# Patient Record
Sex: MALE | Race: WHITE | Employment: OTHER | URBAN - METROPOLITAN AREA
[De-identification: names, ages, dates, MRNs, and addresses within clinical notes are randomized per-mention and may not be internally consistent; named-entity substitution may affect disease eponyms.]

---

## 2017-05-19 ENCOUNTER — OFFICE VISIT (OUTPATIENT)
Dept: CARDIOLOGY CLINIC | Age: 77
End: 2017-05-19

## 2017-05-19 ENCOUNTER — CLINICAL SUPPORT (OUTPATIENT)
Dept: CARDIOLOGY CLINIC | Age: 77
End: 2017-05-19

## 2017-05-19 VITALS
WEIGHT: 197.25 LBS | DIASTOLIC BLOOD PRESSURE: 60 MMHG | BODY MASS INDEX: 29.21 KG/M2 | SYSTOLIC BLOOD PRESSURE: 118 MMHG | RESPIRATION RATE: 16 BRPM | OXYGEN SATURATION: 97 % | HEIGHT: 69 IN | HEART RATE: 61 BPM

## 2017-05-19 DIAGNOSIS — R55 NEAR SYNCOPE: ICD-10-CM

## 2017-05-19 DIAGNOSIS — R00.1 BRADYCARDIA: ICD-10-CM

## 2017-05-19 DIAGNOSIS — I25.10 CORONARY ARTERY DISEASE INVOLVING NATIVE HEART WITHOUT ANGINA PECTORIS, UNSPECIFIED VESSEL OR LESION TYPE: ICD-10-CM

## 2017-05-19 DIAGNOSIS — I10 ESSENTIAL HYPERTENSION: ICD-10-CM

## 2017-05-19 DIAGNOSIS — I25.10 CORONARY ARTERY DISEASE INVOLVING NATIVE HEART WITHOUT ANGINA PECTORIS, UNSPECIFIED VESSEL OR LESION TYPE: Primary | ICD-10-CM

## 2017-05-19 DIAGNOSIS — E78.00 HYPERCHOLESTEROLEMIA: ICD-10-CM

## 2017-05-19 RX ORDER — CEPHALEXIN 500 MG/1
500 CAPSULE ORAL 4 TIMES DAILY
COMMUNITY
Start: 2017-05-10 | End: 2017-05-25

## 2017-05-19 RX ORDER — NITROGLYCERIN 400 UG/1
1 SPRAY ORAL
Qty: 1 BOTTLE | Refills: 1 | Status: SHIPPED | OUTPATIENT
Start: 2017-05-19 | End: 2017-05-25

## 2017-05-19 RX ORDER — NITROGLYCERIN 400 UG/1
1 SPRAY ORAL
Qty: 1 BOTTLE | Refills: 1 | Status: SHIPPED | OUTPATIENT
Start: 2017-05-19 | End: 2017-05-19 | Stop reason: SDUPTHER

## 2017-05-19 NOTE — PROGRESS NOTES
NAME:  Nazario Stover. :   1940   MRN:   [id number]   PCP:  Jose Garcia MD           Subjective: The patient is a 68y.o. year old male  who returns for a annual follow-up. Since the last visit, patient reports no change in exercise tolerance, chest pain, edema, medication intolerance, palpitations, shortness of breath, PND/orthopnea wheezing, sputum. Pt reports one episode of near syncope while standing in his kitchen.  Reports having similar episode in the past. Recently had basal cell carcinoma removed on his right ear, skin graft from right chest.     Past Medical History:   Diagnosis Date    Arthritis     parris knee    Asthma     dr Saranya Sen 1/15/15 note rec'd 16 pul fibrosis    Basal cell carcinoma of skin 05/10/2017    right helix Jose David Baez's note rec'd    CAD (coronary artery disease)     dr Chary Solano     CKD (chronic kidney disease), stage III 2014    Abou Assi notes 6/2/15    Colon polyps 13    also dad with colon cancer    Contact dermatitis and other eczema, due to unspecified cause     eczema dr Angela Mar    COPD     dr Melina Nation chronic bronchitis     16    Elevated serum creatinine     Fall  & 2012    Right knee gave away and tripped in his house    GERD (gastroesophageal reflux disease)     dr Sarina Mccain (hard of hearing)     Hypercholesterolemia     [facility]stitial [facility] 7/15/14    Dr Júnior Manley's   pulmonary fibrosis 16 f/u     Proteinuria 10/2012    abou assi     16    RLS (restless legs syndrome)     Schatzki's ring     dilation dr Parul Trinidad for glaucoma 14    Aryan Barefoot note rec'd (annual eye exam)    Shingles 2012    Sleep apnea     uses CPAP- no longer uses       Social History   Substance Use Topics    Smoking status: Former Smoker    Smokeless tobacco: Former User     Quit date: 1985    Alcohol use No      Family History   Problem Relation Age of Onset    Cancer Father       from colon cancer        Review of Systems  Constitutional: Negative for fever, chills, and diaphoresis. Respiratory: Negative for cough, hemoptysis, sputum production, shortness of breath and wheezing. Cardiovascular: Negative for chest pain, palpitations, orthopnea, claudication, leg swelling and PND. Gastrointestinal: Negative for heartburn, nausea, vomiting, blood in stool and melena. Genitourinary: Negative for dysuria and flank pain. Musculoskeletal: Negative for joint pain and back pain. Skin: Negative for rash. Neurological: Negative for focal weakness, seizures, loss of consciousness, weakness and headaches. Endo/Heme/Allergies: Does not bruise/bleed easily. Psychiatric/Behavioral: Negative for memory loss. The patient does not have insomnia. Objective:       Vitals:    17 1422 17 1436 17 1437   BP: 136/80 130/80 118/60   Pulse: (!) 53 63 61   Resp: 16     SpO2: 97%     Weight: 197 lb 4 oz (89.5 kg)     Height: 5' 9\" (1.753 m)      Body mass index is 29.13 kg/(m^2). General PE    Gen: NAD     Mental Status - Alert. General Appearance - Not in acute distress. Neck - no JVD     Chest and Lung Exam     Inspection: Accessory muscles - No use of accessory muscles in breathing. Auscultation:   Breath sounds: - Normal.     Cardiovascular   Inspection: Jugular vein - Bilateral - Inspection Normal.   Palpation/Percussion:   Apical Impulse: - Normal.   Auscultation: Rhythm - Regular, bradycardia. Heart Sounds - S1 WNL and S2 WNL. No S3 or S4. Murmurs & Other Heart Sounds: Auscultation of the heart reveals - No Murmurs. Peripheral Vascular   Upper Extremity: Inspection - Bilateral - No Cyanotic nailbeds or Digital clubbing. Lower Extremity:   Palpation: Edema - Bilateral - No edema. Abdomen: Soft, non-tender, bowel sounds are active.      Neuro: A&O times 3, CN and motor grossly WNL      Data Review:     EKG -  Sinus  Bradycardia -With rate variation   cv = 11.  -Old anterior infarct. Allergies reviewed  No Known Allergies    Medications reviewed  Current Outpatient Prescriptions   Medication Sig    cephALEXin (KEFLEX) 500 mg capsule Take 500 mg by mouth four (4) times daily.  nitroglycerin (NITROLINGUAL) 400 mcg/spray spray 1 Spray by SubLINGual route every five (5) minutes as needed for Chest Pain.  omeprazole (PRILOSEC) 20 mg capsule TAKE 1 CAPSULES BY MOUTH DAILY. TO REDUCE STOMACH ACID    aspirin delayed-release 81 mg tablet Take  by mouth daily.  pravastatin (PRAVACHOL) 40 mg tablet TAKE 1 TABLET EVERY DAY IN THE NIGHT TO HELP LOWER CHOLESTEROL    PROAIR HFA 90 mcg/actuation inhaler     ketoconazole (NIZORAL) 2 % topical cream     albuterol (PROVENTIL VENTOLIN) 2.5 mg /3 mL (0.083 %) nebulizer solution 3 mL by Nebulization route every four (4) hours as needed for Wheezing or Shortness of Breath.  ALBUTEROL SULFATE (PROAIR HFA IN) Take 2 puffs by inhalation four (4) times daily as needed.  ADVAIR DISKUS 100-50 mcg/Dose diskus inhaler Take 1 puff by inhalation two (2) times a day.  hydrocortisone (WESTCORT) 0.2 % topical cream     loratadine (CLARITIN) 10 mg tablet Take 10 mg by mouth daily.  coenzyme q10 30 mg capsule Take 30 mg by mouth three (3) times daily.  MULTIVITAMINS (MULTIPLE VITAMIN PO) Take  by mouth.  azelastine (ASTELIN) 137 mcg nasal spray 1 Spray by Nasal route two (2) times a day. Use in each nostril as directed     GLUC HCL/CSANA/GLY-AM-GLY,MX/C (COSAMIN DS PO) Take  by mouth.  cholecalciferol, vitamin d3, (VITAMIN D) 1,000 unit tablet Take  by mouth daily. No current facility-administered medications for this visit. Assessment:       ICD-10-CM ICD-9-CM    1. Coronary artery disease involving native heart without angina pectoris, unspecified vessel or lesion type I25.10 414.01 CARDIAC HOLTER MONITOR, 24 HOURS   2.  Essential hypertension I10 401.9 AMB POC EKG ROUTINE W/ 12 LEADS, INTER & REP      CARDIAC HOLTER MONITOR, 24 HOURS   3. Hypercholesterolemia E78.00 272.0 CARDIAC HOLTER MONITOR, 24 HOURS   4. Bradycardia R00.1 427.89 CARDIAC HOLTER MONITOR, 24 HOURS   5. Near syncope R55 780.2 CARDIAC HOLTER MONITOR, 24 HOURS        Orders Placed This Encounter    AMB POC EKG ROUTINE W/ 12 LEADS, INTER & REP     Order Specific Question:   Reason for Exam:     Answer:   routine    HOLTER MONITOR, 24 HOURS, Clinic Performed     Standing Status:   Future     Standing Expiration Date:   2017     Order Specific Question:   Reason for Exam:     Answer:   marked bradycardia, near syncope.  cephALEXin (KEFLEX) 500 mg capsule     Sig: Take 500 mg by mouth four (4) times daily.  nitroglycerin (NITROLINGUAL) 400 mcg/spray spray     Si Spray by SubLINGual route every five (5) minutes as needed for Chest Pain. Dispense:  1 Bottle     Refill:  1       Patient Active Problem List   Diagnosis Code    COPD [facility]chronic obstructive pulmonary disease) ([facility]) J44.9    GERD (gastroesophageal reflux disease) K21.9    Allergic rhinitis J30.9    DJD (degenerative joint disease) of knee M17.10    Vitamin D deficiency E55.9    BPH (benign prostatic hypertrophy) with urinary obstruction N40.1, N13.8    Acne rosacea L71.9    CKD (chronic kidney disease) N18.9    Family history of colon cancer Z80.0    Colon polyp K63.5    Diverticulosis K57.90    ED (erectile dysfunction) N52.9    CAD (coronary artery disease) I25.10    Hypercholesterolemia E78.00    Idiopathic pulmonary fibrosis ([facility] J84.112    Hypertension I10    Left inguinal hernia K40.90    Hematuria R31.9       Plan:     Patient presents for follow up, with recent near syncope. EKG sinus atilio with prolong QT not on typical medications to prolong. Not orthostatic. Will hold prilosec with potential QT prolongation. Holter today and have him see EP. Plan discussed and reviewed  with Dr Suzan Quigley.      Fara Orozco, ANP

## 2017-05-19 NOTE — PROGRESS NOTES
Annual check up. Patient states felt like he was going to pass out few weeks ago. Denies any other symptoms.

## 2017-05-23 ENCOUNTER — TELEPHONE (OUTPATIENT)
Dept: CARDIOLOGY CLINIC | Age: 77
End: 2017-05-23

## 2017-05-23 NOTE — PROGRESS NOTES
SEVERE bradycardia which may have lead to his episode of almost passing out. I discussed with HG. Please have him put in the schedule to see EP this Thursday.

## 2017-05-23 NOTE — TELEPHONE ENCOUNTER
Notes Recorded by Dakota Hoffman LPN on 6/03/3303 at 4:08 PM  Verified patient with two identifiers.  Spoke with patient informed him per Fara CASTILLO SEVERE bradycardia which may have lead to his episode of almost passing out. I discussed with Shannon Anaya have him put in the schedule to see EP this Thursday.  Appt was made for 05/25/2017 at . Rodney Lobo verbalized understanding.

## 2017-05-23 NOTE — PROGRESS NOTES
Verified patient with two identifiers. Spoke with patient informed him per Fara CASTILLO SEVERE bradycardia which may have lead to his episode of almost passing out. I discussed with Jac William have him put in the schedule to see EP this Thursday. Appt was made for 05/25/2017 at 10am.  Patient verbalized understanding.

## 2017-05-23 NOTE — TELEPHONE ENCOUNTER
----- Message from Karson Haque, ANP sent at 5/23/2017  2:18 PM EDT -----  SEVERE bradycardia which may have lead to his episode of almost passing out. I discussed with HG. Please have him put in the schedule to see EP this Thursday.

## 2017-05-25 ENCOUNTER — OFFICE VISIT (OUTPATIENT)
Dept: CARDIOLOGY CLINIC | Age: 77
End: 2017-05-25

## 2017-05-25 ENCOUNTER — HOSPITAL ENCOUNTER (OUTPATIENT)
Dept: LAB | Age: 77
Discharge: HOME | End: 2017-05-25
Payer: MEDICARE

## 2017-05-25 ENCOUNTER — HOSPITAL ENCOUNTER (OUTPATIENT)
Dept: GENERAL RADIOLOGY | Age: 77
Discharge: HOME | End: 2017-05-25
Payer: MEDICARE

## 2017-05-25 VITALS
DIASTOLIC BLOOD PRESSURE: 60 MMHG | SYSTOLIC BLOOD PRESSURE: 122 MMHG | BODY MASS INDEX: 29.33 KG/M2 | HEART RATE: 60 BPM | RESPIRATION RATE: 16 BRPM | HEIGHT: 69 IN | OXYGEN SATURATION: 98 % | WEIGHT: 198 LBS

## 2017-05-25 DIAGNOSIS — I10 ESSENTIAL HYPERTENSION: ICD-10-CM

## 2017-05-25 DIAGNOSIS — R00.1 BRADYCARDIA: Primary | ICD-10-CM

## 2017-05-25 DIAGNOSIS — I49.5 SSS (SICK SINUS SYNDROME) (HCC): ICD-10-CM

## 2017-05-25 DIAGNOSIS — R00.1 BRADYCARDIA: ICD-10-CM

## 2017-05-25 PROCEDURE — 83735 ASSAY OF MAGNESIUM: CPT

## 2017-05-25 PROCEDURE — 71020 XR CHEST PA LAT: CPT

## 2017-05-25 PROCEDURE — 36415 COLL VENOUS BLD VENIPUNCTURE: CPT

## 2017-05-25 PROCEDURE — 85610 PROTHROMBIN TIME: CPT

## 2017-05-25 PROCEDURE — 85027 COMPLETE CBC AUTOMATED: CPT

## 2017-05-25 PROCEDURE — 80053 COMPREHEN METABOLIC PANEL: CPT

## 2017-05-25 NOTE — MR AVS SNAPSHOT
Visit Information Date & Time Provider Department Dept. Phone Encounter #  
 5/25/2017 10:00 AM Uche Perdue Postin, [address] [facility] [phone] [id number] Follow-up Instructions Return in about 4 weeks (around 6/22/2017). Routing History Follow-up and Disposition History Your Appointments 6/2/2017  2:00 PM  
RESULTS/REVIEW with Lisa Douglas MD  
[facility] [address] [place] [address]) Appt Note: holter results, ekr [address] [place] [facility]  
[phone] [address] [place]  
  
    
 6/13/2017  3:30 PM  
EP STUDY with Preston Navarro MD  
[facility] [address]) Appt Note: per Fara, passing out spells [address] [place] [facility]  
[phone] [address] [place] [address] [address]  
  
    
 8/30/2017  2:00 PM  
COMPLETE PHYSICAL with MD Danna Alexander[address]) Appt Note: cpe $0 05/19/2017nb [address] [address]  
[phone]  
  
   
 [address] Upcoming Health Maintenance Date Due DTaP/Tdap/Td series (1 - Tdap) 1/31/2006 GLAUCOMA SCREENING Q2Y 6/18/2016 INFLUENZA AGE 9 TO ADULT 8/1/2017 MEDICARE YEARLY EXAM 8/27/2017 COLONOSCOPY 9/25/2018 Allergies as of 5/25/2017  Review Complete On: 5/25/2017 By: Preston Navarro MD  
 No Known Allergies Current Immunizations  Reviewed on 8/26/2016 Name Date Influenza High Dose Vaccine PF 10/8/2015 Influenza Vaccine 11/15/2014 Pneumococcal Conjugate (PCV-13) 1/28/2015 Pneumococcal Vaccine (Unspecified Type) 1/30/2006 TD Vaccine 1/30/2006 Zoster 9/13/2010 Not reviewed this visit You Were Diagnosed With   
  
 Codes Comments Bradycardia    -  Primary ICD-10-CM: R00.1 ICD-9-CM: 427.89   
 Essential hypertension     ICD-10-CM: I10 
ICD-9-CM: 401.9 SSS (sick sinus syndrome) ([facility])     ICD-10-CM: I49.5 ICD-9-CM: 427.81 Vitals BP Pulse Resp Height(growth percentile) Weight(growth percentile) SpO2  
 122/60 (BP 1 Location: Right arm, BP Patient Position: Sitting) 60 16 5' 9\" (1.753 m) 198 lb (89.8 kg) 98% BMI Smoking Status 29.24 kg/m2 Former Smoker Vitals History BMI and BSA Data Body Mass Index Body Surface Area  
 29.24 kg/m 2 2.09 m 2 Preferred Pharmacy Pharmacy Name Phone [facility] [phone] Your Updated Medication List  
  
   
This list is accurate as of: 5/25/17 10:49 AM.  Always use your most recent med list.  
  
  
  
  
 ADVAIR DISKUS 100-50 mcg/dose diskus inhaler Generic drug:  fluticasone-salmeterol Take 1 puff by inhalation two (2) times a day. aspirin delayed-release 81 mg tablet Take  by mouth daily. ASTELIN 137 mcg (0.1 %) nasal spray Generic drug:  azelastine 1 Spray by Nasal route two (2) times a day. Use in each nostril as directed CLARITIN 10 mg tablet Generic drug:  loratadine Take 10 mg by mouth daily. coenzyme Q-10 30 mg capsule Commonly known as:  CO Q-10 Take 30 mg by mouth three (3) times daily. COSAMIN DS PO Take  by mouth. hydrocortisone valerate 0.2 % topical cream  
Commonly known as:  WESTCORT  
  
 ketoconazole 2 % topical cream  
Commonly known as:  NIZORAL MULTIPLE VITAMIN PO Take  by mouth. omeprazole 20 mg capsule Commonly known as:  PRILOSEC  
TAKE 1 CAPSULES BY MOUTH DAILY. TO REDUCE STOMACH ACID  
  
 pravastatin 40 mg tablet Commonly known as:  PRAVACHOL  
TAKE 1 TABLET EVERY DAY IN THE NIGHT TO HELP LOWER CHOLESTEROL  
  
 * PROAIR HFA IN Take 2 puffs by inhalation four (4) times daily as needed. * albuterol 2.5 mg /3 mL (0.083 %) nebulizer solution Commonly known as:  PROVENTIL VENTOLIN  
3 mL by Nebulization route every four (4) hours as needed for Wheezing or Shortness of Breath. * PROAIR HFA 90 mcg/actuation inhaler Generic drug:  albuterol VITAMIN D3 1,000 unit tablet Generic drug:  cholecalciferol Take  by mouth daily. * Notice: This list has 3 medication(s) that are the same as other medications prescribed for you. Read the directions carefully, and ask your doctor or other care provider to review them with you. We Performed the Following AMB POC EKG ROUTINE W/ 12 LEADS, INTER & REP [23314 CPT(R)] CBC W/O DIFF [19970 CPT(R)] MAGNESIUM [id number] CPT(R)] METABOLIC PANEL, COMPREHENSIVE [13697 CPT(R)] PROTHROMBIN TIME + INR [85374 CPT(R)] Follow-up Instructions Return in about 4 weeks (around 6/22/2017). To-Do List   
 Around 05/25/2017 Imaging:  XR CHEST PA LAT   
  
 05/30/2017 8:00 AM  
  Appointment with [address] [place] [address] at [address] [place] [address] ([phone]) NPO AFTER MIDNIGHT! ROUTINE CASES:  Please arrive 2 hour prior to your scheduled appointment time. If your scheduled appointment is for 0730, 0800, 0815, please arrive by 0645. NON ROUTINE CASES:  PATIENTS WHO REQUIRE LABS, X-RAY, EKG, or MEDS:  PLEASE ARRIVE 3 HOURS PRIOR TO YOUR SCHEDULED APPOINTMENT. If you require hydration prior to your procedure, PLEASE ARRIVE 4 HOURS PRIOR TO YOUR APPOINTMENT  **** IT IS THE OFFICE SCHEDULARS RESPONSBILITY TO NOTIFY THE CATH LAB SCHEDULAR IF THE PATIENT WILL REQUIRE ANY ADDITIONAL TIME FOR PREP FROM ROUTINE CASE ***** Introducing [facility]! Amanda Chaudhary introduces [employer] patient portal. Now you can access parts of your medical record, email your doctor's office, and request medication refills online. 1. In your internet browser, go to [email or web address] 2. Click on the First Time User? Click Here link in the Sign In box. You will see the New Member Sign Up page. 3. Enter your [employer] Access Code exactly as it appears below. You will not need to use this code after youve completed the sign-up process. If you do not sign up before the expiration date, you must request a new code. · [employer] Access Code: [email or web address] Expires: 8/17/2017  3:34 PM 
 
4. Enter the last four digits of your Social Security Number (xxxx) and Date of Birth (mm/dd/yyyy) as indicated and click Submit. You will be taken to the next sign-up page. 5. Create a [employer] ID. This will be your [employer] login ID and cannot be changed, so think of one that is secure and easy to remember. 6. Create a [employer] password. You can change your password at any time. 7. Enter your Password Reset Question and Answer. This can be used at a later time if you forget your password. 8. Enter your e-mail address. You will receive e-mail notification when new information is available in [address] 9. Click Sign Up. You can now view and download portions of your medical record. 10. Click the Download Summary menu link to download a portable copy of your medical information. If you have questions, please visit the Frequently Asked Questions section of the [employer] website. Remember, [employer] is NOT to be used for urgent needs. For medical emergencies, dial 911. Now available from your iPhone and Android! Please provide this summary of care documentation to your next provider. Your primary care clinician is listed as [id number] If you have any questions after today's visit, please call [phone].

## 2017-05-26 LAB
ALBUMIN SERPL-MCNC: 4 G/DL (ref 3.5–4.8)
ALBUMIN/GLOB SERPL: 1.7 {RATIO} (ref 1.2–2.2)
ALP SERPL-CCNC: 90 IU/L (ref 39–117)
ALT SERPL-CCNC: 19 IU/L (ref 0–44)
AST SERPL-CCNC: 26 IU/L (ref 0–40)
BILIRUB SERPL-MCNC: 0.8 MG/DL (ref 0–1.2)
BUN SERPL-MCNC: 23 MG/DL (ref 8–27)
BUN/CREAT SERPL: 18 (ref 10–24)
CALCIUM SERPL-MCNC: 8.8 MG/DL (ref 8.6–10.2)
CHLORIDE SERPL-SCNC: 102 MMOL/L (ref 96–106)
CO2 SERPL-SCNC: 26 MMOL/L (ref 18–29)
CREAT SERPL-MCNC: 1.28 MG/DL (ref 0.76–1.27)
ERYTHROCYTE [DISTWIDTH] IN BLOOD BY AUTOMATED COUNT: 14.5 % (ref 12.3–15.4)
GLOBULIN SER CALC-MCNC: 2.4 G/DL (ref 1.5–4.5)
GLUCOSE SERPL-MCNC: 81 MG/DL (ref 65–99)
HCT VFR BLD AUTO: 46.3 % (ref 37.5–51)
HGB BLD-MCNC: 15.7 G/DL (ref 12.6–17.7)
INR PPP: 1 (ref 0.8–1.2)
INTERPRETATION: NORMAL
MAGNESIUM SERPL-MCNC: 2 MG/DL (ref 1.6–2.3)
MCH RBC QN AUTO: 33.8 PG (ref 26.6–33)
MCHC RBC AUTO-ENTMCNC: 33.9 G/DL (ref 31.5–35.7)
MCV RBC AUTO: 100 FL (ref 79–97)
PLATELET # BLD AUTO: 214 X10E3/UL (ref 150–379)
POTASSIUM SERPL-SCNC: 4.9 MMOL/L (ref 3.5–5.2)
PROT SERPL-MCNC: 6.4 G/DL (ref 6–8.5)
PROTHROMBIN TIME: 10.7 SEC (ref 9.1–12)
RBC # BLD AUTO: 4.65 X10E6/UL (ref 4.14–5.8)
SODIUM SERPL-SCNC: 142 MMOL/L (ref 134–144)
WBC # BLD AUTO: 8.4 X10E3/UL (ref 3.4–10.8)

## 2017-05-30 ENCOUNTER — HOSPITAL ENCOUNTER (OUTPATIENT)
Dept: NON INVASIVE DIAGNOSTICS | Age: 77
Discharge: HOME | End: 2017-05-30
Attending: INTERNAL MEDICINE | Admitting: INTERNAL MEDICINE
Payer: MEDICARE

## 2017-05-30 ENCOUNTER — APPOINTMENT (OUTPATIENT)
Dept: GENERAL RADIOLOGY | Age: 77
End: 2017-05-30
Attending: INTERNAL MEDICINE
Payer: MEDICARE

## 2017-05-30 VITALS
RESPIRATION RATE: 23 BRPM | SYSTOLIC BLOOD PRESSURE: 113 MMHG | TEMPERATURE: 97.8 F | BODY MASS INDEX: 29.53 KG/M2 | HEART RATE: 66 BPM | OXYGEN SATURATION: 95 % | WEIGHT: 200 LBS | DIASTOLIC BLOOD PRESSURE: 65 MMHG

## 2017-05-30 PROCEDURE — 71010 XR CHEST PORT: CPT

## 2017-05-30 PROCEDURE — 77030018729 HC ELECTRD DEFIB PAD CARD -B

## 2017-05-30 PROCEDURE — C1898 LEAD, PMKR, OTHER THAN TRANS: HCPCS

## 2017-05-30 PROCEDURE — 77030018836 HC SOL IRR NACL ICUM -A

## 2017-05-30 PROCEDURE — 74011250636 HC RX REV CODE- 250/636

## 2017-05-30 PROCEDURE — 77030011640 HC PAD GRND REM COVD -A

## 2017-05-30 PROCEDURE — L3670 SO ACRO/CLAV CAN WEB PRE OTS: HCPCS

## 2017-05-30 PROCEDURE — 77030031139 HC SUT VCRL2 J&J -A

## 2017-05-30 PROCEDURE — 33208 INSRT HEART PM ATRIAL & VENT: CPT

## 2017-05-30 PROCEDURE — 77030002996 HC SUT SLK J&J -A

## 2017-05-30 PROCEDURE — C1785 PMKR, DUAL, RATE-RESP: HCPCS

## 2017-05-30 PROCEDURE — 74011000250 HC RX REV CODE- 250

## 2017-05-30 PROCEDURE — C1894 INTRO/SHEATH, NON-LASER: HCPCS

## 2017-05-30 PROCEDURE — C1892 INTRO/SHEATH,FIXED,PEEL-AWAY: HCPCS

## 2017-05-30 RX ORDER — SODIUM CHLORIDE 0.9 % (FLUSH) 0.9 %
5-10 SYRINGE (ML) INJECTION AS NEEDED
Status: DISCONTINUED | OUTPATIENT
Start: 2017-05-30 | End: 2017-05-30 | Stop reason: HOSPADM

## 2017-05-30 RX ORDER — SODIUM CHLORIDE 0.9 % (FLUSH) 0.9 %
5-10 SYRINGE (ML) INJECTION EVERY 8 HOURS
Status: DISCONTINUED | OUTPATIENT
Start: 2017-05-30 | End: 2017-05-30 | Stop reason: HOSPADM

## 2017-05-30 RX ORDER — BACITRACIN 50000 [IU]/1
INJECTION, POWDER, FOR SOLUTION INTRAMUSCULAR
Status: COMPLETED
Start: 2017-05-30 | End: 2017-05-30

## 2017-05-30 RX ORDER — NALOXONE HYDROCHLORIDE 0.4 MG/ML
0.4 INJECTION, SOLUTION INTRAMUSCULAR; INTRAVENOUS; SUBCUTANEOUS AS NEEDED
Status: DISCONTINUED | OUTPATIENT
Start: 2017-05-30 | End: 2017-05-30 | Stop reason: HOSPADM

## 2017-05-30 RX ORDER — FENTANYL CITRATE 50 UG/ML
INJECTION, SOLUTION INTRAMUSCULAR; INTRAVENOUS
Status: COMPLETED
Start: 2017-05-30 | End: 2017-05-30

## 2017-05-30 RX ORDER — LIDOCAINE HYDROCHLORIDE 10 MG/ML
INJECTION INFILTRATION; PERINEURAL
Status: COMPLETED
Start: 2017-05-30 | End: 2017-05-30

## 2017-05-30 RX ORDER — MIDAZOLAM HYDROCHLORIDE 1 MG/ML
INJECTION, SOLUTION INTRAMUSCULAR; INTRAVENOUS
Status: COMPLETED
Start: 2017-05-30 | End: 2017-05-30

## 2017-05-30 RX ORDER — CEFAZOLIN SODIUM IN 0.9 % NACL 2 G/100 ML
2 PLASTIC BAG, INJECTION (ML) INTRAVENOUS ONCE
Status: DISCONTINUED | OUTPATIENT
Start: 2017-05-30 | End: 2017-05-30 | Stop reason: HOSPADM

## 2017-05-30 RX ORDER — HEPARIN SODIUM 200 [USP'U]/100ML
500 INJECTION, SOLUTION INTRAVENOUS ONCE
Status: COMPLETED | OUTPATIENT
Start: 2017-05-30 | End: 2017-05-30

## 2017-05-30 RX ORDER — FENTANYL CITRATE 50 UG/ML
25-50 INJECTION, SOLUTION INTRAMUSCULAR; INTRAVENOUS
Status: DISCONTINUED | OUTPATIENT
Start: 2017-05-30 | End: 2017-05-30

## 2017-05-30 RX ORDER — CEFAZOLIN SODIUM IN 0.9 % NACL 2 G/100 ML
PLASTIC BAG, INJECTION (ML) INTRAVENOUS
Status: COMPLETED
Start: 2017-05-30 | End: 2017-05-30

## 2017-05-30 RX ORDER — LIDOCAINE HYDROCHLORIDE 10 MG/ML
0-30 INJECTION INFILTRATION; PERINEURAL ONCE
Status: COMPLETED | OUTPATIENT
Start: 2017-05-30 | End: 2017-05-30

## 2017-05-30 RX ORDER — BACITRACIN 50000 [IU]/1
50000 INJECTION, POWDER, FOR SOLUTION INTRAMUSCULAR ONCE
Status: COMPLETED | OUTPATIENT
Start: 2017-05-30 | End: 2017-05-30

## 2017-05-30 RX ORDER — MIDAZOLAM HYDROCHLORIDE 1 MG/ML
.5-2 INJECTION, SOLUTION INTRAMUSCULAR; INTRAVENOUS
Status: DISCONTINUED | OUTPATIENT
Start: 2017-05-30 | End: 2017-05-30

## 2017-05-30 RX ORDER — CEFAZOLIN SODIUM IN 0.9 % NACL 2 G/100 ML
2 PLASTIC BAG, INJECTION (ML) INTRAVENOUS ONCE
Status: COMPLETED | OUTPATIENT
Start: 2017-05-30 | End: 2017-05-30

## 2017-05-30 RX ORDER — HEPARIN SODIUM 200 [USP'U]/100ML
INJECTION, SOLUTION INTRAVENOUS
Status: COMPLETED
Start: 2017-05-30 | End: 2017-05-30

## 2017-05-30 RX ADMIN — FENTANYL CITRATE 25 MCG: 50 INJECTION, SOLUTION INTRAMUSCULAR; INTRAVENOUS at 09:40

## 2017-05-30 RX ADMIN — HEPARIN SODIUM 1000 UNITS: 200 INJECTION, SOLUTION INTRAVENOUS at 09:38

## 2017-05-30 RX ADMIN — MIDAZOLAM HYDROCHLORIDE 1 MG: 1 INJECTION INTRAMUSCULAR; INTRAVENOUS at 09:18

## 2017-05-30 RX ADMIN — FENTANYL CITRATE 25 MCG: 50 INJECTION, SOLUTION INTRAMUSCULAR; INTRAVENOUS at 09:59

## 2017-05-30 RX ADMIN — LIDOCAINE HYDROCHLORIDE 30 ML: 10 INJECTION INFILTRATION; PERINEURAL at 09:43

## 2017-05-30 RX ADMIN — Medication 50 MCG: at 09:29

## 2017-05-30 RX ADMIN — MIDAZOLAM HYDROCHLORIDE 1 MG: 1 INJECTION, SOLUTION INTRAMUSCULAR; INTRAVENOUS at 09:30

## 2017-05-30 RX ADMIN — FENTANYL CITRATE 50 MCG: 50 INJECTION, SOLUTION INTRAMUSCULAR; INTRAVENOUS at 09:29

## 2017-05-30 RX ADMIN — MIDAZOLAM HYDROCHLORIDE 1 MG: 1 INJECTION, SOLUTION INTRAMUSCULAR; INTRAVENOUS at 09:45

## 2017-05-30 RX ADMIN — FENTANYL CITRATE 50 MCG: 50 INJECTION, SOLUTION INTRAMUSCULAR; INTRAVENOUS at 09:44

## 2017-05-30 RX ADMIN — Medication 2 G: at 09:10

## 2017-05-30 RX ADMIN — MIDAZOLAM HYDROCHLORIDE 1 MG: 1 INJECTION, SOLUTION INTRAMUSCULAR; INTRAVENOUS at 09:59

## 2017-05-30 RX ADMIN — MIDAZOLAM HYDROCHLORIDE 1 MG: 1 INJECTION, SOLUTION INTRAMUSCULAR; INTRAVENOUS at 09:39

## 2017-05-30 RX ADMIN — BACITRACIN 50000 UNITS: 50000 INJECTION, POWDER, FOR SOLUTION INTRAMUSCULAR at 10:00

## 2017-05-30 RX ADMIN — CEFAZOLIN 2 G: 10 INJECTION, POWDER, FOR SOLUTION INTRAVENOUS; PARENTERAL at 09:10

## 2017-05-30 RX ADMIN — LIDOCAINE HYDROCHLORIDE 30 ML: 10 INJECTION, SOLUTION INFILTRATION; PERINEURAL at 09:43

## 2017-05-30 RX ADMIN — MIDAZOLAM HYDROCHLORIDE 2 MG: 1 INJECTION, SOLUTION INTRAMUSCULAR; INTRAVENOUS at 09:42

## 2017-05-30 RX ADMIN — BACITRACIN 50000 UNITS: 5000 INJECTION, POWDER, FOR SOLUTION INTRAMUSCULAR at 10:00

## 2017-05-30 RX ADMIN — MIDAZOLAM HYDROCHLORIDE 1 MG: 1 INJECTION, SOLUTION INTRAMUSCULAR; INTRAVENOUS at 09:18

## 2017-05-30 NOTE — DISCHARGE INSTRUCTIONS
[address] [place] [address]  [phone]        ICD/PACEMAKER DISCHARGE INSTRUCTIONS    Patient ID:  Yohannes Garner  [id number]  59 y.o.  1940    Admit Date: 5/30/2017    Discharge Date: 5/30/2017     Admitting Physician: Humza York MD     Discharge Physician: Humza York MD    Admission Diagnoses:   SSS  SSS    Discharge Diagnoses: Active Problems:    * No active hospital problems. *  sick sinus syndrome  sob    Discharge Condition: Good    Cardiology Procedures this Admission:  pacemaker implant    Disposition: home    Reference discharge instructions provided by nursing for diet and activity. Follow-up with Dr. Caprice Davis in 2 weeks. Please contact the office for an appointment at [phone]. Signed:  Humza York MD  5/30/2017  10:07 AM    DISCHARGE INSTRUCTIONS FOR PATIENTS WITH ICD'S AND PACEMAKERS    1. Carry you ID card for your ICD/Pacemaker with you at all times. This card will be given to you in the hospital or mailed to you. 2. Medic Alert Bracelets are available from your pharmacist to wear at all times. 3. Call for an appointment in 2 weeks [phone]. 4. The pacemaker will bulge slightly under your skin. An ICD bulges a little more because it is larger. The bulge will decrease in size over the next few weeks. Please notify the doctor's office if you notice any of the following around your ICD site:  A.  A bruise that does not go away  B. Soreness or yellow, green, or brown drainage from the site. C. Any swelling from the site. D. If you have a fever of 100 degrees or higher that lasts for a few days    INCISION CARE       1.  Leave dressing over your site until you see the doctor. 2.  Leave steri-strips over your site until they start to fall off.   3.   You may shower after as long as your incision isnt submerged or directly sprayed upon until well healed. 4.  For comfort, wear loose fitting clothing.   5.  Ice pack to affected shoulder for first 24 hours, wear your sling for 2 days. 6.  Report any signs of infection, fever, pain, swelling, redness, oozing, or heat at site especially if these symptoms increase after the first 3 to 4 days. ACTIVITY PRECAUTIONS     1. Avoid rough contact with the implant site. 2. No driving for 14 days. 3. Avoid lifting your arm over your head, carrying anything on the affected side, or lifting over 10 pounds for 30 days. For the first 2 days only bend your arm at the elbow. 4. Any extreme activity such as golf, weight lifting or exercise biking should be restricted for 60 days. 5. Do not carry objects by holding them against your implant site. 6.  No shooting rifles or any type of gun with the affected shoulder permanently. 7.  If you have an ICD, welding and chainsaws are prohibited. SPECIAL PRECAUTIONS     1. You should avoid all strong magnetic fields, such as arc welding, large transformers, large motors. Some ICD devices will beep if it detects a strong magnet. If this occurs, move out of the area. 2.  You may not have an MRI. 3.  Treatments or surgery that requires diathermy or electrocautery should be discussed with your doctor before scheduled. 4. Avoid radio frequency transmitters, including radar. 5. Advise dentist or other medical personnel you see that you have a pacemaker or ICD. 6.  Cell phones and microwave oven use is okay. 7.  If you plan to move or take a trip to a new area, the doctor's office will give you a name of a doctor to contact for any problems. SPECIAL INSTRUCTIONS ON SHOCKS   1. Notify your doctor for any of the following:      A. Anytime a shock is received in a 24 hour period. An office visit is not usually required for a single shock. B.  Two or more shocks in a row. If you do not feel well, call the [facility], otherwise call your doctor. This may require an office visit.       C. Two or more shocks spaced apart by several hours.  This may require an office visit. 2.  Keep a record of events. Include date, time, symptoms and activity at that time. ANTIBIOTIC THERAPY    During the first 8 weeks after your pacemaker or ICD insertion, you may need antibiotics before any dental work or certain tests or operations. Let the dentist or doctor who is caring for you know that you have had an implanted device.

## 2017-05-30 NOTE — IP AVS SNAPSHOT
[address] [facility] 
[phone] Patient: Andree Hoang. MRN: [id number] You are allergic to the following No active allergies Recent Documentation Weight BMI Smoking Status 90.7 kg 29.53 kg/m2 Former Smoker Emergency Contacts Name Discharge Info Relation Home Work Mobile Fara DORADO DISCHARGE CAREGIVER [3] Child [2] [phone] Jess Treviño DISCHARGE CAREGIVER [3] Daughter [21] [phone] About your hospitalization You were admitted on:  May 30, 2017 You last received care in the:  [facility] ELECTROPHYSIOLOGY You were discharged on:  May 30, 2017 Unit phone number:  [phone] Why you were hospitalized Your primary diagnosis was:  Not on File Providers Seen During Your Hospitalizations Provider Role Specialty Primary office phone Ree Moe MD Attending Provider Cardiology [phone] Your Primary Care Physician (PCP) Primary Care Physician Office Phone Office Fax Lissett Mosqueda [phone] Follow-up Information Follow up With Details Comments Contact Info Nette Locke 
[address] [employer] [place] [address] 
[phone] Your Appointments Tuesday June 13, 2017  3:15 PM EDT  
PACEMAKER with PACEMAKER, [facility]) [address] [facility]  
[phone] Tuesday June 13, 2017  3:30 PM EDT  
EP STUDY with Ree Moe MD  
[address]  [facility] [address] [facility]  
[phone] Current Discharge Medication List  
  
CONTINUE these medications which have NOT CHANGED Dose & Instructions Dispensing Information Comments Morning Noon Evening Bedtime ADVAIR DISKUS 100-50 mcg/dose diskus inhaler Generic drug:  fluticasone-salmeterol Your last dose was: Your next dose is:    
   
   
 Dose:  1 Puff Take 1 puff by inhalation two (2) times a day. Refills:  0  
     
   
   
   
  
 aspirin delayed-release 81 mg tablet Your last dose was: Your next dose is: Take  by mouth daily. Refills:  0 ASTELIN 137 mcg (0.1 %) nasal spray Generic drug:  azelastine Your last dose was: Your next dose is:    
   
   
 Dose:  1 Spray 1 Spray by Nasal route two (2) times a day. Use in each nostril as directed Refills:  0 CLARITIN 10 mg tablet Generic drug:  loratadine Your last dose was: Your next dose is:    
   
   
 Dose:  10 mg Take 10 mg by mouth daily. Refills:  0  
     
   
   
   
  
 coenzyme Q-10 30 mg capsule Commonly known as:  CO Q-10 Your last dose was: Your next dose is:    
   
   
 Dose:  30 mg Take 30 mg by mouth three (3) times daily. Refills:  0  
     
   
   
   
  
 COSAMIN DS PO Your last dose was: Your next dose is: Take  by mouth. Refills:  0  
     
   
   
   
  
 hydrocortisone valerate 0.2 % topical cream  
Commonly known as:  [employer] Your last dose was: Your next dose is:    
   
   
  Refills:  0  
     
   
   
   
  
 ketoconazole 2 % topical cream  
Commonly known as:  Zev Abts Your last dose was: Your next dose is:    
   
   
  Refills:  0 MULTIPLE VITAMIN PO Your last dose was: Your next dose is: Take  by mouth. Refills:  0  
     
   
   
   
  
 omeprazole 20 mg capsule Commonly known as:  PRILOSEC Your last dose was: Your next dose is: TAKE 1 CAPSULES BY MOUTH DAILY. TO REDUCE STOMACH ACID Quantity:  90 Cap Refills:  3 Generic For:PRILOSEC  20MG   N O T I C E    Last quantity doesn't match original quantity  
    
   
   
   
  
 pravastatin 40 mg tablet Commonly known as:  PRAVACHOL Your last dose was: Your next dose is: TAKE 1 TABLET EVERY DAY IN THE NIGHT TO HELP LOWER CHOLESTEROL Quantity:  30 Tab Refills:  12 Generic For:PRAVACHOL 40MG Generic For:PRAVACHOL 40MG * PROAIR HFA IN Your last dose was: Your next dose is:    
   
   
 Dose:  2 Puff Take 2 puffs by inhalation four (4) times daily as needed. Refills:  0  
     
   
   
   
  
 * albuterol 2.5 mg /3 mL (0.083 %) nebulizer solution Commonly known as:  PROVENTIL VENTOLIN Your last dose was: Your next dose is:    
   
   
 Dose:  2.5 mg  
3 mL by Nebulization route every four (4) hours as needed for Wheezing or Shortness of Breath. Quantity:  1 Package Refills:  1  
     
   
   
   
  
 * PROAIR HFA 90 mcg/actuation inhaler Generic drug:  albuterol Your last dose was: Your next dose is:    
   
   
  Refills:  3 VITAMIN D3 1,000 unit tablet Generic drug:  cholecalciferol Your last dose was: Your next dose is: Take  by mouth daily. Refills:  0  
     
   
   
   
  
 * Notice: This list has 3 medication(s) that are the same as other medications prescribed for you. Read the directions carefully, and ask your doctor or other care provider to review them with you. Discharge Instructions [address] [place] [address]  [phone] ICD/PACEMAKER DISCHARGE INSTRUCTIONS Patient ID: 
Brook Rust 
[id number] 
68 y.o. 
1940 Admit Date: 5/30/2017 Discharge Date: 5/30/2017 Admitting Physician: Vilma Shah MD  
 
Discharge Physician: Vilma Shah MD 
 
Admission Diagnoses: SSS 
SSS Discharge Diagnoses: Active Problems: * No active hospital problems. * 
sick sinus syndrome 
sob Discharge Condition: Good Cardiology Procedures this Admission:  pacemaker implant Disposition: home Reference discharge instructions provided by nursing for diet and activity. Follow-up with Dr. Ayon Seen in 2 weeks. Please contact the office for an appointment at [phone]. Signed: 
Tamie Grace MD 
5/30/2017 
10:07 AM 
 
DISCHARGE INSTRUCTIONS FOR PATIENTS WITH ICD'S AND PACEMAKERS 1. Carry you ID card for your ICD/Pacemaker with you at all times. This card will be given to you in the hospital or mailed to you. 2. Medic Alert Bracelets are available from your pharmacist to wear at all times. 3. Call for an appointment in 2 weeks [phone]. 4. The pacemaker will bulge slightly under your skin. An ICD bulges a little more because it is larger. The bulge will decrease in size over the next few weeks. Please notify the doctor's office if you notice any of the following around your ICD site: A.  A bruise that does not go away B. Soreness or yellow, green, or brown drainage from the site. C. Any swelling from the site. D. If you have a fever of 100 degrees or higher that lasts for a few days INCISION CARE 1.  Leave dressing over your site until you see the doctor. 2.  Leave steri-strips over your site until they start to fall off.  
3.   You may shower after as long as your incision isnt submerged or directly sprayed upon until well healed. 4.  For comfort, wear loose fitting clothing. 5.  Ice pack to affected shoulder for first 24 hours, wear your sling for 2 days. 6.  Report any signs of infection, fever, pain, swelling, redness, oozing, or heat at site especially if these symptoms increase after the first 3 to 4 days. ACTIVITY PRECAUTIONS 1. Avoid rough contact with the implant site. 2. No driving for 14 days. 3. Avoid lifting your arm over your head, carrying anything on the affected side, or lifting over 10 pounds for 30 days. For the first 2 days only bend your arm at the elbow. 4. Any extreme activity such as golf, weight lifting or exercise biking should be restricted for 60 days. 5. Do not carry objects by holding them against your implant site. 6.  No shooting rifles or any type of gun with the affected shoulder permanently. 7.  If you have an ICD, welding and chainsaws are prohibited. SPECIAL PRECAUTIONS 1. You should avoid all strong magnetic fields, such as arc welding, large transformers, large motors. Some ICD devices will beep if it detects a strong magnet. If this occurs, move out of the area. 2.  You may not have an MRI. 3.  Treatments or surgery that requires diathermy or electrocautery should be discussed with your doctor before scheduled. 4. Avoid radio frequency transmitters, including radar. 5. Advise dentist or other medical personnel you see that you have a pacemaker or ICD. 6.  Cell phones and microwave oven use is okay. 7.  If you plan to move or take a trip to a new area, the doctor's office will give you a name of a doctor to contact for any problems. SPECIAL INSTRUCTIONS ON SHOCKS 1. Notify your doctor for any of the following: A. Anytime a shock is received in a 24 hour period. An office visit is not usually required for a single shock. B.  Two or more shocks in a row. If you do not feel well, call the Rescue Squad, otherwise call your doctor. This may require an office visit. C. Two or more shocks spaced apart by several hours. This may require an office visit. 2.  Keep a record of events. Include date, time, symptoms and activity at that time. ANTIBIOTIC THERAPY During the first 8 weeks after your pacemaker or ICD insertion, you may need antibiotics before any dental work or certain tests or operations. Let the dentist or doctor who is caring for you know that you have had an implanted device. Discharge Orders None ACO Transitions of Care Introducing [employer] [address] Franco offers a voluntary care coordination program to provide high quality service and care to [facility] fee-for-service beneficiaries. Babita Montes was designed to help you enhance your health and well-being through the following services: ? Transitions of Care  support for individuals who are transitioning from one care setting to another (example: Hospital to home). ? Chronic and Complex Care Coordination  support for individuals and caregivers of those with serious or chronic illnesses or with more than one chronic (ongoing) condition and those who take a number of different medications. If you meet specific medical criteria, a [address] [place] [address] Rd may call you directly to coordinate your care with your primary care physician and your other care providers. For questions about the [facility] programs, please, contact your physicians office. For general questions or additional information about Accountable Care Organizations: 
Please visit www.medicare.gov/acos. html or call 1-800-MEDICARE (8-885.871.6950) TTY users should call 6-190.303.9751. Introducing [facility]! Warren Donnamiriam introduces [employer] patient portal. Now you can access parts of your medical record, email your doctor's office, and request medication refills online. 1. In your internet browser, go to [email or web address] 2. Click on the First Time User? Click Here link in the Sign In box. You will see the New Member Sign Up page. 3. Enter your [employer] Access Code exactly as it appears below. You will not need to use this code after youve completed the sign-up process.  If you do not sign up before the expiration date, you must request a new code. 
 
· [employer] Access Code: [email or web address] Expires: 8/17/2017  3:34 PM 
 
4. Enter the last four digits of your Social Security Number (xxxx) and Date of Birth (mm/dd/yyyy) as indicated and click Submit. You will be taken to the next sign-up page. 5. Create a [employer] ID. This will be your [employer] login ID and cannot be changed, so think of one that is secure and easy to remember. 6. Create a [employer] password. You can change your password at any time. 7. Enter your Password Reset Question and Answer. This can be used at a later time if you forget your password. 8. Enter your e-mail address. You will receive e-mail notification when new information is available in [address] 9. Click Sign Up. You can now view and download portions of your medical record. 10. Click the Download Summary menu link to download a portable copy of your medical information. If you have questions, please visit the Frequently Asked Questions section of the [employer] website. Remember, [employer] is NOT to be used for urgent needs. For medical emergencies, dial 911. Now available from your iPhone and Android! General Information Please provide this summary of care documentation to your next provider. Patient Signature:  ____________________________________________________________ Date:  ____________________________________________________________  
  
Chloe Wilkerson Provider Signature:  ____________________________________________________________ Date:  ____________________________________________________________

## 2017-05-30 NOTE — H&P (VIEW-ONLY)
Subjective:      Samson Charles is a 68 y.o. male is here for EP consult. He reported recent near syncope and subsequent Holter monitor demonstrated episodes of profound bradycardia in the 20s/30s during waking hours. The patient denies chest pain/ shortness of breath, orthopnea, PND, LE edema, palpitations, syncope.      Patient Active Problem List    Diagnosis Date Noted    Left inguinal hernia 08/26/2016    Hematuria 08/26/2016    Hypertension 05/06/2016    Idiopathic pulmonary fibrosis ([facility] [address] 01/28/2015    CAD (coronary artery disease)     Hypercholesterolemia     ED (erectile dysfunction) 10/31/2013    Family history of colon cancer 09/25/2013    Colon polyp 09/25/2013    Diverticulosis 09/25/2013    CKD (chronic kidney disease) 01/31/2012    Vitamin D deficiency 07/21/2011    BPH (benign prostatic hypertrophy) with urinary obstruction 07/21/2011    Acne rosacea 07/21/2011    COPD (chronic obstructive pulmonary disease) ([facility] [address] 07/13/2010    GERD (gastroesophageal reflux disease) 07/13/2010    Allergic rhinitis 07/13/2010    DJD (degenerative joint disease) of knee 07/13/2010      Luis House MD  Past Medical History:   Diagnosis Date    Arthritis     parris knee    Asthma     dr Mikaela Segovia 1/15/15 note rec'd 4/6/16 pul fibrosis    Basal cell carcinoma of skin 05/10/2017    right helix Jose David Baez's note rec'd    CAD (coronary artery disease)     dr Mark Hernandez CKD (chronic kidney disease), stage III 5/2014    Abou Nakitai notes 6/2/15    Colon polyps 09/25/13    also dad with colon cancer    Contact dermatitis and other eczema, due to unspecified cause     eczema dr Deepak Mcgregor    COPD     dr Cheri Trujillo chronic bronchitis     11/16/16    Elevated serum creatinine     Fall 11 & 12/2012    Right knee gave away and tripped in his house    GERD (gastroesophageal reflux disease)     dr Adrian Lemus (hard of hearing)     Hypercholesterolemia     ILD (interstitial lung disease) ([facility] [address] 07/15/2014    Dr Júnior Manley's   pulmonary fibrosis 17 f/u note    Proteinuria 10/2012    abou assi     16    RLS (restless legs syndrome)     Schatzki's ring     dilation dr Jack Majano for glaucoma 14    Dulcy Bleacher note rec'd (annual eye exam)    Shingles 2012    Sleep apnea     uses CPAP- no longer uses      Past Surgical History:   Procedure Laterality Date    ABDOMEN SURGERY PROC UNLISTED      left inq hernia    ENDOSCOPY, COLON, DIAGNOSTIC       dr Clau Orosco repeat in 5 yrs    HX COLONOSCOPY  [id number]    brady- ileocecal valve polyp 5 yr repeat     No Known Allergies   Family History   Problem Relation Age of Onset    Cancer Father       from colon cancer    negative for cardiac disease  Social History     Social History    Marital status:      Spouse name: N/A    Number of children: N/A    Years of education: N/A     Social History Main Topics    Smoking status: Former Smoker    Smokeless tobacco: Former User     Quit date: 1985    Alcohol use No    Drug use: No    Sexual activity: Yes     Birth control/ protection: Spermicide     Other Topics Concern    Not on file     Social History Narrative     Current Outpatient Prescriptions   Medication Sig    omeprazole (PRILOSEC) 20 mg capsule TAKE 1 CAPSULES BY MOUTH DAILY. TO REDUCE STOMACH ACID    aspirin delayed-release 81 mg tablet Take  by mouth daily.  pravastatin (PRAVACHOL) 40 mg tablet TAKE 1 TABLET EVERY DAY IN THE NIGHT TO HELP LOWER CHOLESTEROL    PROAIR HFA 90 mcg/actuation inhaler     ketoconazole (NIZORAL) 2 % topical cream     albuterol (PROVENTIL VENTOLIN) 2.5 mg /3 mL (0.083 %) nebulizer solution 3 mL by Nebulization route every four (4) hours as needed for Wheezing or Shortness of Breath.  ALBUTEROL SULFATE (PROAIR HFA IN) Take 2 puffs by inhalation four (4) times daily as needed.     cholecalciferol, vitamin d3, (VITAMIN D) 1,000 unit tablet Take  by mouth daily.  ADVAIR DISKUS 100-50 mcg/Dose diskus inhaler Take 1 puff by inhalation two (2) times a day.  hydrocortisone (WESTCORT) 0.2 % topical cream     loratadine (CLARITIN) 10 mg tablet Take 10 mg by mouth daily.  coenzyme q10 30 mg capsule Take 30 mg by mouth three (3) times daily.  MULTIVITAMINS (MULTIPLE VITAMIN PO) Take  by mouth.  azelastine (ASTELIN) 137 mcg nasal spray 1 Spray by Nasal route two (2) times a day. Use in each nostril as directed     GLUC HCL/CSANA/GLY-AM-GLY,MX/C (COSAMIN DS PO) Take  by mouth. No current facility-administered medications for this visit. Vitals:    05/25/17 1005   BP: 122/60   Pulse: 60   Resp: 16   SpO2: 98%   Weight: 198 lb (89.8 kg)   Height: 5' 9\" (1.753 m)       I have reviewed the nurses notes, vitals, problem list, allergy list, medical history, family, social history and medications. Review of Symptoms:    General: +fatigue, presyncope. Pt denies excessive weight gain or loss. Pt is able to conduct ADL's  HEENT: Denies blurred vision, headaches, epistaxis and difficulty swallowing. Respiratory: Denies shortness of breath, CORRIGAN, wheezing or stridor. Cardiovascular: Denies precordial pain, palpitations, edema or PND  Gastrointestinal: Denies poor appetite, indigestion, abdominal pain or blood in stool  Urinary: Denies dysuria, pyuria  Musculoskeletal: Denies pain or swelling from muscles or joints  Neurologic: Denies tremor, paresthesias, or sensory motor disturbance  Skin: Denies rash, itching or texture change. Psych: Denies depression      Physical Exam:      General: Well developed, in no acute distress. HEENT: Eyes - PERRL, no jvd  Heart:  Normal S1/S2 negative S3 or S4. Regular, no murmur, gallop or rub.   Respiratory: Clear bilaterally x 4, no wheezing or rales  Abdomen:   Soft, non-tender, bowel sounds are active.   Extremities:  No edema, normal cap refill, no cyanosis.   Musculoskeletal: No clubbing  Neuro: A&Ox3, speech clear, gait stable. Skin: Skin color is normal. No rashes or lesions. Non diaphoretic  Vascular: 2+ pulses symmetric in all extremities    Cardiographics    Ekg: sinus bradycardia    Results for orders placed or performed in visit on 05/19/17   CARDIAC HOLTER MONITOR, 24 HOURS    Narrative    ECG Monitor/24 hours, Complete    Reason for Holter Monitor   SYNCOPE    Heartbeat    Slowest 31  Average 53  Fastest  87        Results:   Underlying Rhythm: Sinus bradycardia      Atrial Arrhythmias: premature atrial contractions; occasional and severe bradycardia            AV Conduction: normal    Ventricular Arrhythmias: premature ventricular contractions; occasional     ST Segment Analysis:normal     Symptom Correlation:  None reported    Comment:   Sinus bradycardia with profound bradycardia to the 20s/30s. Clinical correlation advised. Bar Sarabia MD, [facility]            Lab Results   Component Value Date/Time    WBC 8.1 10/31/2013 11:29 AM    HGB 17.1 10/31/2013 11:29 AM    HCT 49.9 10/31/2013 11:29 AM    PLATELET 863 79/04/4187 11:29 AM    MCV 99 10/31/2013 11:29 AM      Lab Results   Component Value Date/Time    Sodium 143 08/26/2016 11:52 AM    Potassium 4.6 08/26/2016 11:52 AM    Chloride 104 08/26/2016 11:52 AM    CO2 23 08/26/2016 11:52 AM    Anion gap 8 07/13/2010 10:27 AM    Glucose 90 08/26/2016 11:52 AM    BUN 16 08/26/2016 11:52 AM    Creatinine 1.39 08/26/2016 11:52 AM    BUN/Creatinine ratio 12 08/26/2016 11:52 AM    GFR est AA 57 08/26/2016 11:52 AM    GFR est non-AA 49 08/26/2016 11:52 AM    Calcium 9.0 08/26/2016 11:52 AM    Bilirubin, total 1.0 08/26/2016 11:52 AM    AST (SGOT) 22 08/26/2016 11:52 AM    Alk.  phosphatase 94 08/26/2016 11:52 AM    Protein, total 6.5 08/26/2016 11:52 AM    Albumin 4.2 08/26/2016 11:52 AM    Globulin 3.1 07/13/2010 10:27 AM    A-G Ratio 1.8 08/26/2016 11:52 AM    ALT (SGPT) 20 08/26/2016 11:52 AM         Assessment:     Assessment:        ICD-10-CM ICD-9-CM    1. Essential hypertension I10 401.9 AMB POC EKG ROUTINE W/ 12 LEADS, INTER & REP     Orders Placed This Encounter    AMB POC EKG ROUTINE W/ 12 LEADS, INTER & REP     Order Specific Question:   Reason for Exam:     Answer:   ROUTINE        Plan:   Mr. Allie Chandler is here for EP consult for profound bradycardia in the 20s/30s during waking hours on Holter monitor post syncopal episode. He has sick sinus syndrome. EKG today demonstrates sinus bradycardia 54bpm. He is a candidate for a pacemaker. I discussed the risks/benefits/alternatives of the procedure with the patient. Risks include (but are not limited to) bleeding, infection, cva/mi/tamponade/death. The patient understands and agrees to proceed. Thank you for this interesting consultation. We will schedule. He is also non-compliant with CPAP, he is being re-evaluated by sleep apnea. Continue medical management for HTN. Thank you for allowing me to participate in Jackelyn Brianna 's care.     Reginald Phillips MD, [facility]

## 2017-05-30 NOTE — INTERVAL H&P NOTE
H&P Update:  Noelle Perez. was seen and examined. History and physical has been reviewed. The patient has been examined.  There have been no significant clinical changes since the completion of the originally dated History and Physical.    Signed By: Shania Rudolph MD     May 30, 2017 8:40 AM

## 2017-05-30 NOTE — PROGRESS NOTES
Patient ambulated in arteaga with a steady gait. Tolerated well. No complaints. Slight stand by assist.    Dc paperwork reviewed with patient. Aware of fu apts, medications including side effects. Verbalized understanding. No further questions.  Device card given

## 2017-05-30 NOTE — PROCEDURES
[address] [place] [address]  [phone]    Indications and Pre-Procedure Diagnosis:  Aline Ahmadi is a 68 y.o. male with sick sinus syndrome is referred for dual chamber pacemaker. Post Procedure Diagnosis:    Sick sinus syndrome    Pacemaker Implant Procedure and Findings:  Informed consent was obtained and the patient was premedicated with cefazolin. The procedure was performed under local anesthesia. Continuous pulse oximetry and cuff pressure were monitored. During the procedure, the patient received Versed and Fentanyl for sedation per nursing personnel. The left deltopectoral area was prepped and draped in the usual sterile fashion and was liberally infiltrated with 1% lidocaine. An incision was made over the left subpectoral area and a generator pocket was manually dissected. Access was achieved in the left axillary vein under fluoroscopic guidance and using the seldinger technique. Through the left axillary vein, pacing leads were positioned in appropriate regions in the right heart chambers where satisfactory pacing and sensing parameters were measured. Stability of the leads was assessed with deep breathing and there was no diaphragmatic pacing at 10V output. The leads were anchored using the sleeves and a pulse generator pocket fashioned using blunt dissection. The leads were then connected to the pulse generator. The pulse generator pocket was then liberally infiltrated with bacitracin solution, and the device implanted with a single silk fixation suture in the header to prevent migration. The wound was closed in layers using continuous 2-0 Vicryl and 4-0 Vicryl ending with a sub-cuticular closure. Fluoroscopy and total procedure times were 2 and 30 minutes respectively. Estimated blood loss <10 ml. Sharp count: correct. Specimen(s) collected: none. The following procedure related complication occurred: none. The following problems were encountered: none. Findings: successful pacemaker placement. Device Data Measurements:  Lead Sensing (mV) Threshold (V)Pulse Width (ms) Impedance (Ohms)  RA 5.5  1.2  0.5   687  RV 7.5  1.1  0.5   950      Final Programmed Parameters  Bradycardia pacing rate  60 bpm  Pacing Mode    DDDR  Pacing Output    3.5 V@ 0.5 ms    Supplies Summary available in the chart  [employer]    Thank you for allowing me to participate in this patients care.     Marleny Anderson MD, Aaron Peña

## 2017-06-12 ENCOUNTER — TELEPHONE (OUTPATIENT)
Dept: CARDIOLOGY CLINIC | Age: 77
End: 2017-06-12

## 2017-06-12 NOTE — TELEPHONE ENCOUNTER
We had to cancel patient's visit tomorrow. We had a spot for June 27 however; he will be on vacation. The next appt I had available is like end July and he felt maybe to late because he still has a bandage on. Tomorrow's visit was a procedure follow up. When can we put him in or is end of July ok?   Thanks

## 2017-06-13 NOTE — TELEPHONE ENCOUNTER
Spke with patient - only pacemaker post op appt needed. Already scheduled for 6/20/17 - reviewed with patient. He verbalized understanding.

## 2017-06-20 ENCOUNTER — CLINICAL SUPPORT (OUTPATIENT)
Dept: CARDIOLOGY CLINIC | Age: 77
End: 2017-06-20

## 2017-06-20 DIAGNOSIS — I49.5 SSS (SICK SINUS SYNDROME) (HCC): Primary | Chronic | ICD-10-CM

## 2017-06-20 NOTE — PROGRESS NOTES
Priya Days.  1940  Osvaldo Armstrong MD          Subjective:    Patient is here for 2 week site check and device interrogation after pacemaker implantation. The patient denies chest pain or shortness of breath.      Patient Active Problem List    Diagnosis Date Noted    SSS (sick sinus syndrome) ([facility] 05/25/2017    Left inguinal hernia 08/26/2016    Hematuria 08/26/2016    Hypertension 05/06/2016    Idiopathic pulmonary fibrosis ([facility] 01/28/2015    CAD (coronary artery disease)     Hypercholesterolemia     ED (erectile dysfunction) 10/31/2013    Family history of colon cancer 09/25/2013    Colon polyp 09/25/2013    Diverticulosis 09/25/2013    CKD (chronic kidney disease) 01/31/2012    Vitamin D deficiency 07/21/2011    BPH (benign prostatic hypertrophy) with urinary obstruction 07/21/2011    Acne rosacea 07/21/2011    COPD (chronic obstructive pulmonary disease) ([facility] 07/13/2010    GERD (gastroesophageal reflux disease) 07/13/2010    Allergic rhinitis 07/13/2010    DJD (degenerative joint disease) of knee 07/13/2010      Past Medical History:   Diagnosis Date    Arthritis     parris knee    Asthma     dr Jess Younger 1/15/15 note rec'd 4/6/16 pul fibrosis    Basal cell carcinoma of skin 05/10/2017    right helix Jose David Baez's note rec'd    CAD (coronary artery disease)     dr Denise Maedra     CKD (chronic kidney disease), stage III 5/2014    Krys Vasques notes 6/2/15    Colon polyps 09/25/13    also dad with colon cancer    Contact dermatitis and other eczema, due to unspecified cause     eczema dr Jemal Mkceon    COPD     dr Kandy Orellana chronic bronchitis     11/16/16    Elevated serum creatinine     Fall 11 & 12/2012    Right knee gave away and tripped in his house    GERD (gastroesophageal reflux disease)     dr Saintclair Berthold (hard of hearing)     Hypercholesterolemia     ILD (interstitial lung disease) ([facility] 07/15/2014    Dr Júnior Manley's   pulmonary fibrosis 5/18/17 f/u note    Proteinuria 10/2012    abou assi     16    RLS (restless legs syndrome)     Schatzki's ring     dilation dr Radhika Neri for glaucoma 14    Kevin Gil note rec'd (annual eye exam)    Shingles 2012    Sleep apnea     uses CPAP- no longer uses      Past Surgical History:   Procedure Laterality Date    ABDOMEN SURGERY PROC UNLISTED      left inq hernia    ENDOSCOPY, COLON, DIAGNOSTIC       dr Sean Webber repeat in 5 yrs    HX COLONOSCOPY  [id number]    brady- ileocecal valve polyp 5 yr repeat     No Known Allergies   Family History   Problem Relation Age of Onset    Cancer Father       from colon cancer      Current Outpatient Prescriptions   Medication Sig    omeprazole (PRILOSEC) 20 mg capsule TAKE 1 CAPSULES BY MOUTH DAILY. TO REDUCE STOMACH ACID    aspirin delayed-release 81 mg tablet Take  by mouth daily.  pravastatin (PRAVACHOL) 40 mg tablet TAKE 1 TABLET EVERY DAY IN THE NIGHT TO HELP LOWER CHOLESTEROL    PROAIR HFA 90 mcg/actuation inhaler     ketoconazole (NIZORAL) 2 % topical cream     albuterol (PROVENTIL VENTOLIN) 2.5 mg /3 mL (0.083 %) nebulizer solution 3 mL by Nebulization route every four (4) hours as needed for Wheezing or Shortness of Breath.  ALBUTEROL SULFATE (PROAIR HFA IN) Take 2 puffs by inhalation four (4) times daily as needed.  cholecalciferol, vitamin d3, (VITAMIN D) 1,000 unit tablet Take  by mouth daily.  ADVAIR DISKUS 100-50 mcg/Dose diskus inhaler Take 1 puff by inhalation two (2) times a day.  hydrocortisone (WESTCORT) 0.2 % topical cream     loratadine (CLARITIN) 10 mg tablet Take 10 mg by mouth daily.  coenzyme q10 30 mg capsule Take 30 mg by mouth three (3) times daily.  MULTIVITAMINS (MULTIPLE VITAMIN PO) Take  by mouth.  azelastine (ASTELIN) 137 mcg nasal spray 1 Spray by Nasal route two (2) times a day.  Use in each nostril as directed     GLUC HCL/CSANA/GLY-AM-GLY,MX/C (COSAMIN DS PO) Take  by mouth.     No current facility-administered medications for this visit. Review of Systems:    General: Pt denies excessive weight gain or loss. Pt is able to conduct ADL's  Respiratory: Denies shortness of breath, CORRIGAN, wheezing or stridor. Cardiovascular: Denies precordial pain, palpitations, edema or PND        Physical ExamPhysical Exam:      General: Well developed, in no acute distress. .  Chest: left subclavian pacer site approximated well  Neuro: A&Ox3, speech clear, gait stable. Assessment:   Assessment:     ICD-10-CM ICD-9-CM    1. SSS (sick sinus syndrome) ([facility]) I49.5 427.81         Plan:     Patient feels well following pacemaker implant. Left subclavian pacemaker site approximated well, no discharge. Steri-strips removed for further inspection, re-applied to incision. No erythema or heat. Follow up as planned.      Elisabeth Leyva, JONATHAN

## 2017-06-21 ENCOUNTER — TELEPHONE (OUTPATIENT)
Dept: CARDIOLOGY CLINIC | Age: 77
End: 2017-06-21

## 2017-06-21 NOTE — TELEPHONE ENCOUNTER
Pt daughter called needing clearance for patient for dental surgery faxed over to Dr Serina Lieberman ofc. Also need antibiotic called in also. Please fax to: [phone].      Thanks

## 2017-06-22 ENCOUNTER — TELEPHONE (OUTPATIENT)
Dept: CARDIOLOGY CLINIC | Age: 77
End: 2017-06-22

## 2017-06-22 NOTE — TELEPHONE ENCOUNTER
----- Message from Anita Ferreira sent at 6/22/2017  2:08 PM EDT -----  Regarding: daughter called again about her dad's dental procedure. Contact: [phone]  Daughter called said Dr. Josiane Beltran didn't receive form, Please fax med release form to fax [phone] to surgeon or email [email or web address]

## 2017-09-06 ENCOUNTER — HOSPITAL ENCOUNTER (OUTPATIENT)
Dept: GENERAL RADIOLOGY | Age: 77
Discharge: HOME | End: 2017-09-06
Payer: MEDICARE

## 2017-09-06 ENCOUNTER — OFFICE VISIT (OUTPATIENT)
Dept: FAMILY MEDICINE CLINIC | Age: 77
End: 2017-09-06

## 2017-09-06 VITALS
RESPIRATION RATE: 18 BRPM | OXYGEN SATURATION: 95 % | WEIGHT: 193.6 LBS | SYSTOLIC BLOOD PRESSURE: 107 MMHG | DIASTOLIC BLOOD PRESSURE: 77 MMHG | TEMPERATURE: 96.2 F | HEART RATE: 63 BPM | BODY MASS INDEX: 28.68 KG/M2 | HEIGHT: 69 IN

## 2017-09-06 DIAGNOSIS — J44.9 CHRONIC BRONCHITIS WITH COPD (CHRONIC OBSTRUCTIVE PULMONARY DISEASE) (HCC): ICD-10-CM

## 2017-09-06 DIAGNOSIS — J44.9 CHRONIC OBSTRUCTIVE PULMONARY DISEASE, UNSPECIFIED COPD TYPE (HCC): ICD-10-CM

## 2017-09-06 DIAGNOSIS — I49.5 SSS (SICK SINUS SYNDROME) (HCC): Chronic | ICD-10-CM

## 2017-09-06 DIAGNOSIS — I10 ESSENTIAL HYPERTENSION: ICD-10-CM

## 2017-09-06 DIAGNOSIS — K63.5 POLYP OF COLON, UNSPECIFIED PART OF COLON, UNSPECIFIED TYPE: ICD-10-CM

## 2017-09-06 DIAGNOSIS — K21.9 GASTROESOPHAGEAL REFLUX DISEASE WITHOUT ESOPHAGITIS: ICD-10-CM

## 2017-09-06 DIAGNOSIS — Z23 ENCOUNTER FOR IMMUNIZATION: ICD-10-CM

## 2017-09-06 DIAGNOSIS — E78.00 HYPERCHOLESTEROLEMIA: ICD-10-CM

## 2017-09-06 DIAGNOSIS — R31.9 HEMATURIA: ICD-10-CM

## 2017-09-06 DIAGNOSIS — J84.112 IDIOPATHIC PULMONARY FIBROSIS (HCC): ICD-10-CM

## 2017-09-06 DIAGNOSIS — N52.9 ERECTILE DYSFUNCTION, UNSPECIFIED ERECTILE DYSFUNCTION TYPE: ICD-10-CM

## 2017-09-06 DIAGNOSIS — E55.9 VITAMIN D DEFICIENCY: ICD-10-CM

## 2017-09-06 DIAGNOSIS — N40.1 BPH (BENIGN PROSTATIC HYPERTROPHY) WITH URINARY OBSTRUCTION: ICD-10-CM

## 2017-09-06 DIAGNOSIS — I25.10 CORONARY ARTERY DISEASE INVOLVING NATIVE HEART WITHOUT ANGINA PECTORIS, UNSPECIFIED VESSEL OR LESION TYPE: ICD-10-CM

## 2017-09-06 DIAGNOSIS — N18.9 CKD (CHRONIC KIDNEY DISEASE), UNSPECIFIED STAGE: ICD-10-CM

## 2017-09-06 DIAGNOSIS — N13.8 BPH (BENIGN PROSTATIC HYPERTROPHY) WITH URINARY OBSTRUCTION: ICD-10-CM

## 2017-09-06 DIAGNOSIS — L71.9 ACNE ROSACEA: ICD-10-CM

## 2017-09-06 DIAGNOSIS — K40.90 LEFT INGUINAL HERNIA: ICD-10-CM

## 2017-09-06 DIAGNOSIS — Z00.00 PHYSICAL EXAM: Primary | ICD-10-CM

## 2017-09-06 DIAGNOSIS — K57.90 DIVERTICULOSIS OF INTESTINE WITHOUT BLEEDING, UNSPECIFIED INTESTINAL TRACT LOCATION: ICD-10-CM

## 2017-09-06 PROCEDURE — 71020 XR CHEST PA LAT: CPT

## 2017-09-06 NOTE — ASSESSMENT & PLAN NOTE
This condition is managed by Specialist.  Key COPD Medications             PROAIR HFA 90 mcg/actuation inhaler     albuterol (PROVENTIL VENTOLIN) 2.5 mg /3 mL (0.083 %) nebulizer solution 3 mL by Nebulization route every four (4) hours as needed for Wheezing or Shortness of Breath. ALBUTEROL SULFATE (PROAIR HFA IN) Take 2 puffs by inhalation four (4) times daily as needed. ADVAIR DISKUS 100-50 mcg/Dose diskus inhaler Take 1 puff by inhalation two (2) times a day.         Lab Results   Component Value Date/Time    WBC 8.4 05/25/2017 11:46 AM    HGB 15.7 05/25/2017 11:46 AM    HCT 46.3 05/25/2017 11:46 AM    PLATELET 505 97/06/8777 11:46 AM

## 2017-09-06 NOTE — ASSESSMENT & PLAN NOTE
This condition is managed by Specialist.  Key CAD CHF Meds             aspirin delayed-release 81 mg tablet Take  by mouth daily.         Key Antihyperlipidemia Meds             pravastatin (PRAVACHOL) 40 mg tablet TAKE 1 TABLET EVERY DAY IN THE NIGHT TO HELP LOWER CHOLESTEROL        Lab Results   Component Value Date/Time    Sodium 142 05/25/2017 11:46 AM    Potassium 4.9 05/25/2017 11:46 AM    Cholesterol, total 143 08/26/2016 11:52 AM    HDL Cholesterol 54 08/26/2016 11:52 AM    LDL, calculated 75 08/26/2016 11:52 AM    Triglyceride 71 08/26/2016 11:52 AM    INR 1.0 05/25/2017 11:46 AM    Prothrombin time 10.7 05/25/2017 11:46 AM

## 2017-09-06 NOTE — LETTER
9/8/2017 10:34 AM 
 
Mr. Cale Zhang [address] 
[address] [id number] Dear Cale Han.: 
 
Please find your most recent results below. Resulted Orders VITAMIN D, 25 HYDROXY Result Value Ref Range VITAMIN D, 25-HYDROXY 49.9 30.0 - 100.0 ng/mL Comment:  
   Vitamin D deficiency has been defined by the [address] [place] [address] practice guideline as a 
level of serum 25-OH vitamin D less than 20 ng/mL (1,2). The Endocrine Society went on to further define vitamin D 
insufficiency as a level between 21 and 29 ng/mL (2). 1. IOM ([place] [employer]). 2010. Dietary reference 
   intakes for calcium and D. [facility]: The 
   [employer]. 2. Francisco MF, Oswlado GRAJEDA, Deborah LYNN, et al. 
   Evaluation, treatment, and prevention of vitamin D 
   deficiency: an Endocrine Society clinical practice 
   guideline. JCEM. 2011 Jul; 96(7):1911-30. Narrative Performed at:  [address]  [id number] : Stephanie Johnson MD, Phone:  [phone] URINALYSIS W/ RFLX MICROSCOPIC Result Value Ref Range Specific Gravity 1.025 1.005 - 1.030  
 pH (UA) 6.0 5.0 - 7.5 Color Yellow Yellow Appearance Clear Clear Leukocyte Esterase Negative Negative Protein 1+ (A) Negative/Trace Glucose Negative Negative Ketone Trace (A) Negative Blood Negative Negative Bilirubin Negative Negative Urobilinogen 1.0 0.2 - 1.0 mg/dL Nitrites Negative Negative Microscopic Examination See additional order Comment:  
   Microscopic was indicated and was performed. Narrative Performed at:  [address]  [id number] : Stephanie Johnson MD, Phone:  [phone] TSH 3RD GENERATION Result Value Ref Range TSH 1.650 0.450 - 4.500 uIU/mL Narrative Performed at:  [place] [address] [address]  [id number] : Darell Reyna MD, Phone:  [phone] LIPID PANEL Result Value Ref Range Cholesterol, total 126 100 - 199 mg/dL Triglyceride 57 0 - 149 mg/dL HDL Cholesterol 52 >39 mg/dL VLDL, calculated 11 5 - 40 mg/dL LDL, calculated 63 0 - 99 mg/dL Narrative Performed at:  [address]  [id number] : Darell Reyna MD, Phone:  [phone] MICROSCOPIC EXAMINATION Result Value Ref Range WBC 0-5 0 - 5 /hpf  
 RBC 3-10 (A) 0 - 2 /hpf Epithelial cells None seen 0 - 10 /hpf Casts Present (A) None seen /lpf Cast type Hyaline casts N/A Mucus Present Not Estab. Bacteria Few None seen/Few Narrative Performed at:  [address]  [id number] : Darell Reyna MD, Phone:  [phone] CKD REPORT Result Value Ref Range Interpretation Note Comment:  
   Supplement report is available. Narrative Performed at:  [facility] [place] [address]  [id number] : Radha Johnson PhD, Phone:  [phone] CVD REPORT Result Value Ref Range INTERPRETATION NTAP   
 PDF IMAGE Not applicable Narrative Performed at:  [facility] [place] [address]  [id number] : Radha Johnson PhD, Phone:  [phone] RECOMMENDATIONS:

## 2017-09-06 NOTE — MR AVS SNAPSHOT
Visit Information Date & Time Provider Department Dept. Phone Encounter #  
 9/6/2017  9:20 AM Margarita Riddle MD [facility] [phone] [id number] Follow-up Instructions Return in about 3 months (around 12/6/2017) for Tiffany MWV with NP. Your Appointments 9/26/2017  2:15 PM  
PACEMAKER with PACEMAKER, [facility] [address]) Appt Note: 3mo bsc dcpm  
 [address] [facility]  
[phone] [address] [facility] Upcoming Health Maintenance Date Due DTaP/Tdap/Td series (1 - Tdap) 1/31/2006 MEDICARE YEARLY EXAM 8/27/2017 GLAUCOMA SCREENING Q2Y 12/5/2017* COLONOSCOPY 9/25/2018 *Topic was postponed. The date shown is not the original due date. Allergies as of 9/6/2017  Review Complete On: 9/6/2017 By: Margarita Riddle MD  
 No Known Allergies Current Immunizations  Reviewed on 9/5/2017 Name Date Influenza High Dose Vaccine PF 10/8/2015 Influenza Vaccine 11/15/2014 Pneumococcal Conjugate (PCV-13) 1/28/2015 Pneumococcal Polysaccharide (PPSV-23) 1/30/2006 TD Vaccine 1/30/2006 Zoster 9/13/2010 Not reviewed this visit You Were Diagnosed With   
  
 Codes Comments Physical exam    -  Primary ICD-10-CM: Z00.00 ICD-9-CM: V70.9 Chronic obstructive pulmonary disease, unspecified COPD type ([facility]     ICD-10-CM: J44.9 ICD-9-CM: 247 Idiopathic pulmonary fibrosis ([facility]     ICD-10-CM: A85.145 ICD-9-CM: 516.31   
 SSS (sick sinus syndrome) (HCC)     ICD-10-CM: I49.5 ICD-9-CM: 427.81 Chronic bronchitis with COPD (chronic obstructive pulmonary disease) (HCC)     ICD-10-CM: J44.9 ICD-9-CM: 491.20 Gastroesophageal reflux disease without esophagitis     ICD-10-CM: K21.9 ICD-9-CM: 530.81 Vitamin D deficiency     ICD-10-CM: E55.9 ICD-9-CM: 268.9 BPH (benign prostatic hypertrophy) with urinary obstruction     ICD-10-CM: N40.1, N13.8 ICD-9-CM: 600.01, 599.69 Acne rosacea     ICD-10-CM: L71.9 ICD-9-CM: 695.3 CKD (chronic kidney disease), unspecified stage     ICD-10-CM: N18.9 ICD-9-CM: 585.9 Polyp of colon, unspecified part of colon, unspecified type     ICD-10-CM: K63.5 ICD-9-CM: 211.3 Diverticulosis of intestine without bleeding, unspecified intestinal tract location     ICD-10-CM: K57.90 ICD-9-CM: 562.10 Erectile dysfunction, unspecified erectile dysfunction type     ICD-10-CM: N52.9 ICD-9-CM: 607.84 Coronary artery disease involving native heart without angina pectoris, unspecified vessel or lesion type     ICD-10-CM: I25.10 ICD-9-CM: 414.01 Hypercholesterolemia     ICD-10-CM: E78.00 ICD-9-CM: 272.0 Essential hypertension     ICD-10-CM: I10 
ICD-9-CM: 401.9 Left inguinal hernia     ICD-10-CM: K40.90 ICD-9-CM: 550.90 Hematuria     ICD-10-CM: R31.9 ICD-9-CM: 599.70 Vitals BP Pulse Temp Resp Height(growth percentile) Weight(growth percentile) 107/77 (BP 1 Location: Left arm, BP Patient Position: Sitting) 63 96.2 °F (35.7 °C) (Oral) 18 5' 9\" (1.753 m) 193 lb 9.6 oz (87.8 kg) SpO2 BMI Smoking Status 95% 28.59 kg/m2 Former Smoker Vitals History BMI and BSA Data Body Mass Index Body Surface Area 28.59 kg/m 2 2.07 m 2 Preferred Pharmacy Pharmacy Name Phone [facility] [phone] Your Updated Medication List  
  
   
This list is accurate as of: 9/6/17 10:31 AM.  Always use your most recent med list.  
  
  
  
  
 ADVAIR DISKUS 100-50 mcg/dose diskus inhaler Generic drug:  fluticasone-salmeterol Take 1 puff by inhalation two (2) times a day. aspirin delayed-release 81 mg tablet Take  by mouth daily. ASTELIN 137 mcg (0.1 %) nasal spray Generic drug:  azelastine 1 Spray by Nasal route two (2) times a day. Use in each nostril as directed CLARITIN 10 mg tablet Generic drug:  loratadine Take 10 mg by mouth daily. coenzyme Q-10 30 mg capsule Commonly known as:  CO Q-10 Take 30 mg by mouth three (3) times daily. COSAMIN DS PO Take  by mouth. hydrocortisone valerate 0.2 % topical cream  
Commonly known as:  WESTCORT  
  
 ketoconazole 2 % topical cream  
Commonly known as:  NIZORAL MULTIPLE VITAMIN PO Take  by mouth. omeprazole 20 mg capsule Commonly known as:  PRILOSEC  
TAKE 1 CAPSULES BY MOUTH DAILY. TO REDUCE STOMACH ACID  
  
 pravastatin 40 mg tablet Commonly known as:  PRAVACHOL  
TAKE 1 TABLET EVERY DAY IN THE NIGHT TO HELP LOWER CHOLESTEROL  
  
 * PROAIR HFA IN Take 2 puffs by inhalation four (4) times daily as needed. * albuterol 2.5 mg /3 mL (0.083 %) nebulizer solution Commonly known as:  PROVENTIL VENTOLIN  
3 mL by Nebulization route every four (4) hours as needed for Wheezing or Shortness of Breath. VITAMIN D3 1,000 unit tablet Generic drug:  cholecalciferol Take  by mouth daily. * Notice: This list has 2 medication(s) that are the same as other medications prescribed for you. Read the directions carefully, and ask your doctor or other care provider to review them with you. We Performed the Following LIPID PANEL [78764 CPT(R)] TSH 3RD GENERATION [32723 CPT(R)] URINALYSIS W/ RFLX MICROSCOPIC [59817 CPT(R)] VITAMIN D, 25 HYDROXY [id number] CPT(R)] Follow-up Instructions Return in about 3 months (around 12/6/2017) for Tiffany MWV with NP. To-Do List   
 09/06/2017 Imaging:  XR CHEST PA LAT Introducing [address]! Yonathan Smith introduces [employer] patient portal. Now you can access parts of your medical record, email your doctor's office, and request medication refills online.    
 
1. In your internet browser, go to [email or web address] 2. Click on the First Time User? Click Here link in the Sign In box. You will see the New Member Sign Up page. 3. Enter your [employer] Access Code exactly as it appears below. You will not need to use this code after youve completed the sign-up process. If you do not sign up before the expiration date, you must request a new code. · [employer] Access Code: [email or web address] Expires: 12/5/2017 10:31 AM 
 
4. Enter the last four digits of your Social Security Number (xxxx) and Date of Birth (mm/dd/yyyy) as indicated and click Submit. You will be taken to the next sign-up page. 5. Create a [employer] ID. This will be your [employer] login ID and cannot be changed, so think of one that is secure and easy to remember. 6. Create a [employer] password. You can change your password at any time. 7. Enter your Password Reset Question and Answer. This can be used at a later time if you forget your password. 8. Enter your e-mail address. You will receive e-mail notification when new information is available in [address] 9. Click Sign Up. You can now view and download portions of your medical record. 10. Click the Download Summary menu link to download a portable copy of your medical information. If you have questions, please visit the Frequently Asked Questions section of the [employer] website. Remember, [employer] is NOT to be used for urgent needs. For medical emergencies, dial 911. Now available from your iPhone and Android! Please provide this summary of care documentation to your next provider. Your primary care clinician is listed as [id number] If you have any questions after today's visit, please call [phone].

## 2017-09-06 NOTE — PROGRESS NOTES
HISTORY OF PRESENT ILLNESS  Sandra Chan is a 68 y.o. male. HPI   Here for [facility]. Cough improving. Sees pulm 1-2 x annually. Pt states LIH in AM.  Has to press on it to reduce. Nocturia 2-3 x. Card q 3 mos for pacemaker adjustment. Eye doctor annually. Dentist 3 x past yr. Neph q 3 mos. Still gets feeling of food hanging in throat. Colonoscopy '14.  5 yr repeat for polyps. Pacemaker placed past May-June. No other signif hx past yr. Patient Active Problem List   Diagnosis Code    COPD (chronic obstructive pulmonary disease) ([facility]) J44.9    GERD (gastroesophageal reflux disease) K21.9    Allergic rhinitis J30.9    DJD (degenerative joint disease) of knee M17.10    Vitamin D deficiency E55.9    BPH (benign prostatic hypertrophy) with urinary obstruction N40.1, N13.8    Acne rosacea L71.9    CKD (chronic kidney disease) N18.9    Family history of colon cancer Z80.0    Colon polyp K63.5    Diverticulosis K57.90    ED (erectile dysfunction) N52.9    CAD (coronary artery disease) I25.10    Hypercholesterolemia E78.00    Idiopathic pulmonary fibrosis ([facility] J84.112    Hypertension I10    Left inguinal hernia K40.90    Hematuria R31.9    SSS (sick sinus syndrome) ([facility]) I49.5     Current Outpatient Prescriptions   Medication Sig Dispense Refill    omeprazole (PRILOSEC) 20 mg capsule TAKE 1 CAPSULES BY MOUTH DAILY. TO REDUCE STOMACH ACID 90 Cap 3    aspirin delayed-release 81 mg tablet Take  by mouth daily.  pravastatin (PRAVACHOL) 40 mg tablet TAKE 1 TABLET EVERY DAY IN THE NIGHT TO HELP LOWER CHOLESTEROL 30 Tab 12    ketoconazole (NIZORAL) 2 % topical cream       albuterol (PROVENTIL VENTOLIN) 2.5 mg /3 mL (0.083 %) nebulizer solution 3 mL by Nebulization route every four (4) hours as needed for Wheezing or Shortness of Breath. 1 Package 1    ALBUTEROL SULFATE (PROAIR HFA IN) Take 2 puffs by inhalation four (4) times daily as needed.       cholecalciferol, vitamin d3, (VITAMIN D) 1,000 unit tablet Take  by mouth daily.  ADVAIR DISKUS 100-50 mcg/Dose diskus inhaler Take 1 puff by inhalation two (2) times a day.  hydrocortisone (WESTCORT) 0.2 % topical cream       loratadine (CLARITIN) 10 mg tablet Take 10 mg by mouth daily.  coenzyme q10 30 mg capsule Take 30 mg by mouth three (3) times daily.  MULTIVITAMINS (MULTIPLE VITAMIN PO) Take  by mouth.  azelastine (ASTELIN) 137 mcg nasal spray 1 Spray by Nasal route two (2) times a day. Use in each nostril as directed       GLUC HCL/CSANA/GLY-AM-GLY,MX/C (COSAMIN DS PO) Take  by mouth.        No Known Allergies  Past Medical History:   Diagnosis Date    Arthritis     parris knee    Asthma     dr Aysha Granado 5/18/17    Basal cell carcinoma of skin 05/10/2017    right helix Jose David Baez's note rec'd    CAD (coronary artery disease)     dr Vibha Laguerre     CKD (chronic kidney disease), stage III 05/2014    [facility] notes 8/8/17 note and lab    Colon polyps 09/25/13    also dad with colon cancer    Contact dermatitis and other eczema, due to unspecified cause     eczema dr Sadia Whitmore    COPD     dr Javi Kerr chronic bronchitis     5/18/17    Elevated serum creatinine     Fall 11 & 12/2012    Right knee gave away and tripped in his house    GERD (gastroesophageal reflux disease)     dr Hannah Franks (hard of hearing)     Hypercholesterolemia     ILD (interstitial lung disease) ([facility] [address] 07/15/2014    Dr Júnior Manley's   pulmonary fibrosis 5/18/17 f/u note    Proteinuria 10/2012    abou assi     12/2/16    RLS (restless legs syndrome)     Schatzki's ring 5/03    dilation dr Claudene Born for glaucoma 6/18/14    Raenelle Lips note rec'd (annual eye exam)    Shingles 4/2012    Sleep apnea     uses CPAP- 7/6/17 f/u note [facility] note/sleep study     Past Surgical History:   Procedure Laterality Date    ABDOMEN SURGERY PROC UNLISTED      left inq hernia    CARDIAC SURG PROCEDURE UNLIST 2017    Pacemaker    ENDOSCOPY, COLON, DIAGNOSTIC       dr Clif Lipscomb repeat in 5 yrs    EXTRACTION ERUPTED TOOTH/EXR  2017    3 teeth extraction.  HX COLONOSCOPY  [id number]    brady- ileocecal valve polyp 5 yr repeat    HX HEENT  Summer 2017    Right ear basal cell carcinoma    HX PACEMAKER  2017    Dr. Aryan Ibrahim     Family History   Problem Relation Age of Onset    Cancer Father       from colon cancer     Social History   Substance Use Topics    Smoking status: Former Smoker    Smokeless tobacco: Former User     Quit date: 1985    Alcohol use No      Lab Results  Component Value Date/Time   WBC 8.4 2017 11:46 AM   HGB 15.7 2017 11:46 AM   HCT 46.3 2017 11:46 AM   PLATELET 368 8393 11:46 AM    2017 11:46 AM     Lab Results  Component Value Date/Time   Hemoglobin A1c 5.8 2009 08:53 AM   Glucose 81 2017 11:46 AM   LDL, calculated 75 2016 11:52 AM   Creatinine 1.28 2017 11:46 AM      Lab Results  Component Value Date/Time   Cholesterol, total 143 2016 11:52 AM   HDL Cholesterol 54 2016 11:52 AM   LDL, calculated 75 2016 11:52 AM   Triglyceride 71 2016 11:52 AM   CHOL/HDL Ratio 2.9 2010 10:27 AM   Lab Results  Component Value Date/Time   ALT (SGPT) 19 2017 11:46 AM   AST (SGOT) 26 2017 11:46 AM   Alk.  phosphatase 90 2017 11:46 AM   Bilirubin, direct 0.19 2012 08:30 AM   Bilirubin, total 0.8 2017 11:46 AM   Albumin 4.0 2017 11:46 AM   Protein, total 6.4 2017 11:46 AM   INR 1.0 2017 11:46 AM   Prothrombin time 10.7 2017 11:46 AM   PLATELET 537  11:46 AM       Lab Results  Component Value Date/Time   GFR est non-AA 54 2017 11:46 AM   GFR est AA 62 2017 11:46 AM   Creatinine 1.28 2017 11:46 AM   BUN 23 2017 11:46 AM   Sodium 142 2017 11:46 AM   Potassium 4.9 2017 11:46 AM   Chloride 102 05/25/2017 11:46 AM   CO2 26 05/25/2017 11:46 AM   Magnesium 2.0 05/25/2017 11:46 AM   Lab Results  Component Value Date/Time   TSH 1.980 08/26/2016 11:52 AM      Lab Results   Component Value Date/Time    Glucose 81 05/25/2017 11:46 AM      Fasting for labs. Review of Systems   Constitutional: Negative. HENT: Negative. Eyes: Positive for blurred vision. Respiratory: Positive for cough and sputum production. Gastrointestinal: Negative. Genitourinary: Negative. Musculoskeletal: Positive for joint pain. Skin: Negative. BCCA right ear rmoved May and June. Neurological: Negative. Endo/Heme/Allergies: Negative. Psychiatric/Behavioral: Negative. Physical Exam   Vitals:    09/06/17 0932   BP: 107/77   Pulse: 63   Resp: 18   Temp: 96.2 °F (35.7 °C)   TempSrc: Oral   SpO2: 95%   Weight: 193 lb 9.6 oz (87.8 kg)   Height: 5' 9\" (1.753 m)       General  alert, cooperative, no distress, appears stated age   Head  Normocephalic, without obvious abnormality, atraumatic   Eyes  conjunctivae/corneas clear. Pupils small. Fundus not vis on right. Ears  Canals occluded with cerumen post removal of hearing aides. Nose Passages patent. Mucosa normal. No drainage or sinus tenderness. Throat Lips, mucosa, and tongue normal. Teeth and gums normal.  Post pharynx neg. Neck supple, nontender, no adenopathy, thyroid: not enlarged, no masses/nodules, no carotid bruits   Back   symmetric, no curvature. FROM. No CVA tenderness   Lungs   With crackles on left side. Chest wall  no tenderness   Heart  regular rate and rhythm, no murmur, click, rub or gallop   Abdomen   soft, non-tender. Bowel sounds normal. No masses,  No organomegaly. Ventral hernia. Genitalia  Testes descended bilat w/o masses. LIH direct. NT. Rectal  Deferred. Extremities extremities normal, atraumatic, no cyanosis or edema. Mod varicosities.    Pulses 1-2+ and symmetric   Skin No rashes or lesions       Neurologic Unable Romberg, can get up on heels and toes. Unable Tandem gait. ASSESSMENT and PLAN    ICD-10-CM ICD-9-CM    1. Physical exam Z00.00 V70.9    2. Chronic obstructive pulmonary disease, unspecified COPD type ([facility]) J44.9 496 XR CHEST PA LAT   3. Idiopathic pulmonary fibrosis ([facility] J84.112 516.31    4. SSS (sick sinus syndrome) ([facility]) I49.5 427.81    5. Chronic bronchitis with COPD (chronic obstructive pulmonary disease) ([facility]) J44.9 491.20 XR CHEST PA LAT   6. Gastroesophageal reflux disease without esophagitis K21.9 530.81    7. Vitamin D deficiency E55.9 268.9 VITAMIN D, 25 HYDROXY   8. BPH (benign prostatic hypertrophy) with urinary obstruction N40.1 600.01     N13.8 599.69    9. Acne rosacea L71.9 695.3    10. CKD (chronic kidney disease), unspecified stage N18.9 585.9 VITAMIN D, 25 HYDROXY      URINALYSIS W/ RFLX MICROSCOPIC   11. Polyp of colon, unspecified part of colon, unspecified type K63.5 211.3    12. Diverticulosis of intestine without bleeding, unspecified intestinal tract location K57.90 562.10    13. Erectile dysfunction, unspecified erectile dysfunction type N52.9 607.84    14. Coronary artery disease involving native heart without angina pectoris, unspecified vessel or lesion type I25.10 414.01 LIPID PANEL   15. Hypercholesterolemia E78.00 272.0 LIPID PANEL   16. Essential hypertension I10 401.9 URINALYSIS W/ RFLX MICROSCOPIC      TSH 3RD GENERATION      LIPID PANEL   17. Left inguinal hernia K40.90 550.90    18. Hematuria R31.9 599.70 URINALYSIS W/ RFLX MICROSCOPIC     Diagnoses and all orders for this visit:    1. Physical exam    2. Chronic obstructive pulmonary disease, unspecified COPD type ([facility]  Assessment & Plan:   This condition is managed by Specialist.  Key COPD Medications             PROAIR HFA 90 mcg/actuation inhaler     albuterol (PROVENTIL VENTOLIN) 2.5 mg /3 mL (0.083 %) nebulizer solution 3 mL by Nebulization route every four (4) hours as needed for Wheezing or Shortness of Breath. ALBUTEROL SULFATE (PROAIR HFA IN) Take 2 puffs by inhalation four (4) times daily as needed. ADVAIR DISKUS 100-50 mcg/Dose diskus inhaler Take 1 puff by inhalation two (2) times a day. Lab Results   Component Value Date/Time    WBC 8.4 05/25/2017 11:46 AM    HGB 15.7 05/25/2017 11:46 AM    HCT 46.3 05/25/2017 11:46 AM    PLATELET 113 88/72/4704 11:46 AM       Orders:  -     XR CHEST PA LAT; Future    3. Idiopathic pulmonary fibrosis ([facility]  Assessment & Plan: This condition is managed by Specialist.  Key COPD Medications             PROAIR HFA 90 mcg/actuation inhaler     albuterol (PROVENTIL VENTOLIN) 2.5 mg /3 mL (0.083 %) nebulizer solution 3 mL by Nebulization route every four (4) hours as needed for Wheezing or Shortness of Breath. ALBUTEROL SULFATE (PROAIR HFA IN) Take 2 puffs by inhalation four (4) times daily as needed. ADVAIR DISKUS 100-50 mcg/Dose diskus inhaler Take 1 puff by inhalation two (2) times a day. Lab Results   Component Value Date/Time    WBC 8.4 05/25/2017 11:46 AM    HGB 15.7 05/25/2017 11:46 AM    HCT 46.3 05/25/2017 11:46 AM    PLATELET 997 41/63/5346 11:46 AM         4. SSS (sick sinus syndrome) ([facility]  Assessment & Plan: This condition is managed by Specialist.  Key CAD CHF Meds             aspirin delayed-release 81 mg tablet Take  by mouth daily. Key Antihyperlipidemia Meds             pravastatin (PRAVACHOL) 40 mg tablet TAKE 1 TABLET EVERY DAY IN THE NIGHT TO HELP LOWER CHOLESTEROL        Lab Results   Component Value Date/Time    Sodium 142 05/25/2017 11:46 AM    Potassium 4.9 05/25/2017 11:46 AM    Cholesterol, total 143 08/26/2016 11:52 AM    HDL Cholesterol 54 08/26/2016 11:52 AM    LDL, calculated 75 08/26/2016 11:52 AM    Triglyceride 71 08/26/2016 11:52 AM    INR 1.0 05/25/2017 11:46 AM    Prothrombin time 10.7 05/25/2017 11:46 AM         5.  Chronic bronchitis with COPD (chronic obstructive pulmonary disease) ([facility])  -     XR CHEST PA LAT; Future    6. Gastroesophageal reflux disease without esophagitis    7. Vitamin D deficiency  -     VITAMIN D, 25 HYDROXY    8. BPH (benign prostatic hypertrophy) with urinary obstruction    9. Acne rosacea    10. CKD (chronic kidney disease), unspecified stage  -     VITAMIN D, 25 HYDROXY  -     URINALYSIS W/ RFLX MICROSCOPIC    11. Polyp of colon, unspecified part of colon, unspecified type    12. Diverticulosis of intestine without bleeding, unspecified intestinal tract location    13. Erectile dysfunction, unspecified erectile dysfunction type    14. Coronary artery disease involving native heart without angina pectoris, unspecified vessel or lesion type  -     LIPID PANEL    15. Hypercholesterolemia  -     LIPID PANEL    16. Essential hypertension  -     URINALYSIS W/ RFLX MICROSCOPIC  -     TSH 3RD GENERATION  -     LIPID PANEL    17. Left inguinal hernia    18.  Hematuria  -     URINALYSIS W/ RFLX MICROSCOPIC

## 2017-09-06 NOTE — PROGRESS NOTES
Chief Complaint   Patient presents with    Complete Physical    Cough     Cold symptoms for the past week and Mucus is yellow green. Fasting for labs. 1. Have you been to the ER, urgent care clinic since your last visit? Hospitalized since your last visit? Yes When: 5/30/17 Where: [facility] Reason for visit: Pacemaker placed. Also had 3 teeth extracted. Right ear removal of basab cell carcinoma. 2. Have you seen or consulted any other health care providers outside of the [address] [place] [address] since your last visit? Include any pap smears or colon screening. Yes When: 2017 Where: [facility] Reason for visit: Basal Cell carcinoma removed. The patient was counseled on the dangers of tobacco use, and Patient is a non smoker. Reviewed strategies to maximize success, including Continue not to smoke. Complete Physical Exam Male  Pre-Visit Questions:    1. Do you follow a low fat or low salt diet ? y  2. Do you follow an exercise program? n  3. Have you had your tetanus booster in the last 10 years? n  4. Have you ever had a Pneumonia vaccine? y  11. Do you smoke? n  6. Do you consider yourself overweight? n  7. Do you perform Testicular self exam?y  8. Is there a family history of CAD< age 48? n  5. Is there a family history of Cancer? y  8. Do you have any Cancer risks? y  6. Have you had a colonoscopy? y  15. Have you been to your eye doctor past year?   y  15. Have you been to your dentist in the last 6 months?  y  15.   Have you had your flu shot for this season? y   Needs a referral to see the eye doctor, Dermatologist, Pulmonary Dr. Sonam Bagley signed for a physical.

## 2017-09-06 NOTE — LETTER
9/6/2017 3:09 PM 
 
Mr. Josefa Zhang [address] 
[address] [id number] Dear Josefa Beltran.: 
 
Please find your most recent results below. Resulted Orders XR CHEST PA LAT Narrative EXAM:  XR CHEST PA LAT INDICATION:   F/u COPD, bronchitis COMPARISON: 5/30/2017. FINDINGS: PA and lateral radiographs of the chest again demonstrate patchy 
opacification of the left lower lung. Coarsened reticular opacifications are 
again noted at the peripheral lower lungs. There is no pneumothorax or pleural 
effusion. Cardiac size remains upper limits of normal and mild thoracic aortic 
tortuosity is again shown. No hilar enlargement is evident. Mild to moderate 
diffuse osteopenia with moderate to severe thoracic spine degenerative changes 
are again shown. A left axillary pacemaker with right atrial and ventricular 
leads is shown in the interval. 
  
 Impression IMPRESSION:  
1. Nonspecific patchy left lower lung pulmonary densities again noted. Coarsened 
reticular opacities again shown and lower lungs testing pulmonary fibrosis. 2. Interval pacemaker with dual leads. Sincerely, [employer] [facility]

## 2017-09-07 LAB
25(OH)D3+25(OH)D2 SERPL-MCNC: 49.9 NG/ML (ref 30–100)
APPEARANCE UR: CLEAR
BACTERIA #/AREA URNS HPF: ABNORMAL /[HPF]
BILIRUB UR QL STRIP: NEGATIVE
CASTS URNS MICRO: ABNORMAL
CASTS URNS QL MICRO: PRESENT /LPF
CHOLEST SERPL-MCNC: 126 MG/DL (ref 100–199)
COLOR UR: YELLOW
EPI CELLS #/AREA URNS HPF: ABNORMAL /HPF
GLUCOSE UR QL: NEGATIVE
HDLC SERPL-MCNC: 52 MG/DL
HGB UR QL STRIP: NEGATIVE
INTERPRETATION, 910389: NORMAL
INTERPRETATION: NORMAL
KETONES UR QL STRIP: ABNORMAL
LDLC SERPL CALC-MCNC: 63 MG/DL (ref 0–99)
LEUKOCYTE ESTERASE UR QL STRIP: NEGATIVE
MICRO URNS: ABNORMAL
MUCOUS THREADS URNS QL MICRO: PRESENT
NITRITE UR QL STRIP: NEGATIVE
PDF IMAGE, 910387: NORMAL
PH UR STRIP: 6 [PH] (ref 5–7.5)
PROT UR QL STRIP: ABNORMAL
RBC #/AREA URNS HPF: ABNORMAL /HPF
SP GR UR: 1.02 (ref 1–1.03)
TRIGL SERPL-MCNC: 57 MG/DL (ref 0–149)
TSH SERPL DL<=0.005 MIU/L-ACNC: 1.65 UIU/ML (ref 0.45–4.5)
UROBILINOGEN UR STRIP-MCNC: 1 MG/DL (ref 0.2–1)
VLDLC SERPL CALC-MCNC: 11 MG/DL (ref 5–40)
WBC #/AREA URNS HPF: ABNORMAL /HPF

## 2017-09-10 RX ORDER — PRAVASTATIN SODIUM 40 MG/1
TABLET ORAL
Qty: 30 TAB | Refills: 12 | Status: SHIPPED | OUTPATIENT
Start: 2017-09-10 | End: 2017-09-26 | Stop reason: SDUPTHER

## 2017-09-26 ENCOUNTER — CLINICAL SUPPORT (OUTPATIENT)
Dept: CARDIOLOGY CLINIC | Age: 77
End: 2017-09-26

## 2017-09-26 ENCOUNTER — OFFICE VISIT (OUTPATIENT)
Dept: CARDIOLOGY CLINIC | Age: 77
End: 2017-09-26

## 2017-09-26 VITALS
WEIGHT: 190.6 LBS | SYSTOLIC BLOOD PRESSURE: 92 MMHG | HEIGHT: 69 IN | OXYGEN SATURATION: 99 % | HEART RATE: 70 BPM | BODY MASS INDEX: 28.23 KG/M2 | DIASTOLIC BLOOD PRESSURE: 60 MMHG | RESPIRATION RATE: 18 BRPM

## 2017-09-26 DIAGNOSIS — R55 SYNCOPE AND COLLAPSE: ICD-10-CM

## 2017-09-26 DIAGNOSIS — I44.2 CHB (COMPLETE HEART BLOCK) (HCC): ICD-10-CM

## 2017-09-26 DIAGNOSIS — R00.1 BRADYCARDIA: ICD-10-CM

## 2017-09-26 DIAGNOSIS — I49.5 SSS (SICK SINUS SYNDROME) (HCC): Chronic | ICD-10-CM

## 2017-09-26 DIAGNOSIS — I10 ESSENTIAL HYPERTENSION: Primary | ICD-10-CM

## 2017-09-26 DIAGNOSIS — I49.5 SSS (SICK SINUS SYNDROME) (HCC): ICD-10-CM

## 2017-09-26 DIAGNOSIS — Z95.0 PACEMAKER: Primary | ICD-10-CM

## 2017-09-26 NOTE — PROGRESS NOTES
Subjective:      Jerilyn Trujillo. is a 68 y.o. male is here for EP consult. He had has a couple of syncopal episodes while sitting. He denies prodrome prior and failed to write down his BP when it occurred.  The patient denies chest pain/ shortness of breath, orthopnea, PND, LE edema, palpitations      Patient Active Problem List    Diagnosis Date Noted    SSS (sick sinus syndrome) ([facility] 05/25/2017    Left inguinal hernia 08/26/2016    Hematuria 08/26/2016    Hypertension 05/06/2016    Idiopathic pulmonary fibrosis ([facility] 01/28/2015    CAD (coronary artery disease)     Hypercholesterolemia     ED (erectile dysfunction) 10/31/2013    Family history of colon cancer 09/25/2013    Colon polyp 09/25/2013    Diverticulosis 09/25/2013    CKD (chronic kidney disease) 01/31/2012    Vitamin D deficiency 07/21/2011    BPH (benign prostatic hypertrophy) with urinary obstruction 07/21/2011    Acne rosacea 07/21/2011    COPD (chronic obstructive pulmonary disease) ([facility] 07/13/2010    GERD (gastroesophageal reflux disease) 07/13/2010      Jennifer Vasquez MD  Past Medical History:   Diagnosis Date    Arthritis     parris knee    Asthma     dr Natalie Grewal 5/18/17    Basal cell carcinoma of skin 05/10/2017    right helix Jose David Baez's note rec'd    CAD (coronary artery disease)     dr Hanna Mckeon CKD (chronic kidney disease), stage III 05/2014    Abou Layo notes 8/8/17 note and lab    Colon polyps 09/25/13    also dad with colon cancer    Contact dermatitis and other eczema, due to unspecified cause     eczema dr Renu Cervantes    COPD     dr Jono Thacker chronic bronchitis     5/18/17    Elevated serum creatinine     Fall 11 & 12/2012    Right knee gave away and tripped in his house    GERD (gastroesophageal reflux disease)     dr Torres Palma (hard of hearing)     Hypercholesterolemia     ILD (interstitial lung disease) ([facility] 07/15/2014    Dr Júnior Manley's   pulmonary fibrosis 5/18/17 f/u note    Proteinuria 10/2012    abou assi     16    RLS (restless legs syndrome)     Schatzki's ring     dilation dr Tez Shin for glaucoma 2016    Declan Ram note rec'd (annual eye exam)    Shingles 2012    Sleep apnea     uses CPAP- 17 f/u note [facility] note/sleep study 17      Past Surgical History:   Procedure Laterality Date    ABDOMEN SURGERY PROC UNLISTED      left inq hernia    CARDIAC SURG PROCEDURE UNLIST  2017    Pacemaker    ENDOSCOPY, COLON, DIAGNOSTIC       dr Rodríguez Tinsley repeat in 5 yrs    EXTRACTION ERUPTED TOOTH/EXR      3 teeth extraction.  HX COLONOSCOPY  [id number]    brady- ileocecal valve polyp 5 yr repeat    HX HEENT  Summer 2017    Right ear basal cell carcinoma    HX PACEMAKER  2017    Dr. Jeison Winchester     No Known Allergies   Family History   Problem Relation Age of Onset   Pavan Cernao Cancer Father       from colon cancer    negative for cardiac disease  Social History     Social History    Marital status:      Spouse name: N/A    Number of children: N/A    Years of education: N/A     Social History Main Topics    Smoking status: Former Smoker    Smokeless tobacco: Former User     Quit date: 1985    Alcohol use No    Drug use: No    Sexual activity: Yes     Birth control/ protection: Spermicide     Other Topics Concern    None     Social History Narrative     Current Outpatient Prescriptions   Medication Sig    omeprazole (PRILOSEC) 20 mg capsule TAKE 1 CAPSULES BY MOUTH DAILY. TO REDUCE STOMACH ACID    aspirin delayed-release 81 mg tablet Take  by mouth daily.  pravastatin (PRAVACHOL) 40 mg tablet TAKE 1 TABLET EVERY DAY IN THE NIGHT TO HELP LOWER CHOLESTEROL    ketoconazole (NIZORAL) 2 % topical cream     albuterol (PROVENTIL VENTOLIN) 2.5 mg /3 mL (0.083 %) nebulizer solution 3 mL by Nebulization route every four (4) hours as needed for Wheezing or Shortness of Breath.     ALBUTEROL SULFATE (PROAIR HFA IN) Take 2 puffs by inhalation four (4) times daily as needed.  cholecalciferol, vitamin d3, (VITAMIN D) 1,000 unit tablet Take  by mouth daily.  ADVAIR DISKUS 100-50 mcg/Dose diskus inhaler Take 1 puff by inhalation two (2) times a day.  hydrocortisone (WESTCORT) 0.2 % topical cream     loratadine (CLARITIN) 10 mg tablet Take 10 mg by mouth daily.  coenzyme q10 30 mg capsule Take 30 mg by mouth three (3) times daily.  MULTIVITAMINS (MULTIPLE VITAMIN PO) Take  by mouth daily.  azelastine (ASTELIN) 137 mcg nasal spray 1 Spray by Nasal route two (2) times a day. Use in each nostril as directed     GLUC HCL/CSANA/GLY-AM-GLY,MX/C (COSAMIN DS PO) Take  by mouth daily. No current facility-administered medications for this visit. Vitals:    09/26/17 1415 09/26/17 1423 09/26/17 1424   BP: 100/70 92/60 92/60   Pulse: 70     Resp: 18     SpO2: 99%     Weight: 190 lb 9.6 oz (86.5 kg)     Height: 5' 9\" (1.753 m)         I have reviewed the nurses notes, vitals, problem list, allergy list, medical history, family, social history and medications. Review of Symptoms:    General: Pt denies excessive weight gain or loss. Pt is able to conduct ADL's  HEENT: Denies blurred vision, headaches, epistaxis and difficulty swallowing. Respiratory: Denies shortness of breath, CORRIGAN, wheezing or stridor. Cardiovascular: Denies precordial pain, palpitations, edema or PND  Gastrointestinal: Denies poor appetite, indigestion, abdominal pain or blood in stool  Urinary: Denies dysuria, pyuria  Musculoskeletal: Denies pain or swelling from muscles or joints  Neurologic: +syncope  Skin: Denies rash, itching or texture change. Psych: Denies depression      Physical Exam:      General: Well developed, in no acute distress. HEENT: Eyes - PERRL, no jvd  Heart:  Normal S1/S2 negative S3 or S4.  Regular, no murmur, gallop or rub.   Respiratory: Clear bilaterally x 4, no wheezing or rales  Abdomen:   Soft, non-tender, bowel sounds are active.   Extremities:  No edema, normal cap refill, no cyanosis. Musculoskeletal: No clubbing  Neuro: A&Ox3, speech clear, gait stable. Skin: Skin color is normal. No rashes or lesions. Non diaphoretic  Vascular: 2+ pulses symmetric in all extremities    Cardiographics    Ekg: av paced    Pacer - AV paced 65%    Results for orders placed or performed in visit on 05/19/17   CARDIAC HOLTER MONITOR, 24 HOURS    Narrative    ECG Monitor/24 hours, Complete    Reason for Holter Monitor   SYNCOPE    Heartbeat    Slowest 31  Average 53  Fastest  87        Results:   Underlying Rhythm: Sinus bradycardia      Atrial Arrhythmias: premature atrial contractions; occasional and severe bradycardia            AV Conduction: normal    Ventricular Arrhythmias: premature ventricular contractions; occasional     ST Segment Analysis:normal     Symptom Correlation:  None reported    Comment:   Sinus bradycardia with profound bradycardia to the 20s/30s. Clinical correlation advised. Coty Jiménez MD, [facility]            Lab Results   Component Value Date/Time    WBC 8.4 05/25/2017 11:46 AM    HGB 15.7 05/25/2017 11:46 AM    HCT 46.3 05/25/2017 11:46 AM    PLATELET 492 48/49/3288 11:46 AM     05/25/2017 11:46 AM      Lab Results   Component Value Date/Time    Sodium 142 05/25/2017 11:46 AM    Potassium 4.9 05/25/2017 11:46 AM    Chloride 102 05/25/2017 11:46 AM    CO2 26 05/25/2017 11:46 AM    Anion gap 8 07/13/2010 10:27 AM    Glucose 81 05/25/2017 11:46 AM    BUN 23 05/25/2017 11:46 AM    Creatinine 1.28 05/25/2017 11:46 AM    BUN/Creatinine ratio 18 05/25/2017 11:46 AM    GFR est AA 62 05/25/2017 11:46 AM    GFR est non-AA 54 05/25/2017 11:46 AM    Calcium 8.8 05/25/2017 11:46 AM    Bilirubin, total 0.8 05/25/2017 11:46 AM    AST (SGOT) 26 05/25/2017 11:46 AM    Alk.  phosphatase 90 05/25/2017 11:46 AM    Protein, total 6.4 05/25/2017 11:46 AM    Albumin 4.0 05/25/2017 11:46 AM    Globulin 3.1 07/13/2010 10:27 AM    A-G Ratio 1.7 05/25/2017 11:46 AM    ALT (SGPT) 19 05/25/2017 11:46 AM         Assessment:     Assessment:        ICD-10-CM ICD-9-CM    1. Essential hypertension I10 401.9 AMB POC EKG ROUTINE W/ 12 LEADS, INTER & REP      2D ECHO COMPLETE ADULT (TTE) W OR WO CONTR   2. CHB (complete heart block) ([facility]) I44.2 426.0 AMB POC EKG ROUTINE W/ 12 LEADS, INTER & REP      2D ECHO COMPLETE ADULT (TTE) W OR WO CONTR   3. SSS (sick sinus syndrome) ([facility]) I49.5 427.81 AMB POC EKG ROUTINE W/ 12 LEADS, INTER & REP      2D ECHO COMPLETE ADULT (TTE) W OR WO CONTR   4. Syncope and collapse R55 780.2 AMB POC EKG ROUTINE W/ 12 LEADS, INTER & REP      2D ECHO COMPLETE ADULT (TTE) W OR WO CONTR     Orders Placed This Encounter    AMB POC EKG ROUTINE W/ 12 LEADS, INTER & REP     Order Specific Question:   Reason for Exam:     Answer:   routine    2D ECHO COMPLETE ADULT (TTE) W OR WO CONTR     Standing Status:   Future     Standing Expiration Date:   3/26/2018     Order Specific Question:   Reason for Exam:     Answer:   syncope     Order Specific Question:   Contrast Enhancement (Bubble Study, Definity, Optison) may be used if criteria listed in established evidence-based protocol has been identified. Answer:   Yes        Plan:   Mr Keiry Foster is having occasional syncopal episodes without prodrome. He is slightly hypotensive and not on any meds. I encouraged fluid intake. He is AV paced 65% without any arrhythmias noted. We will obtain an echo to assess his LVEF and have him f/u with Dr Paulo Vega. He will follow up in the device clinic per routine and with me in one year. Continue medical management for sss, chb and syncope. Thank you for allowing me to participate in [facility] 's care.     January Franklin MD, [facility]

## 2017-09-26 NOTE — MR AVS SNAPSHOT
Visit Information Date & Time Provider Department Dept. Phone Encounter #  
 9/26/2017  2:15 PM Mercy Schaeffer MD [facility] [phone] [id number] Your Appointments 10/3/2017 10:00 AM  
ECHO CARDIOGRAMS 2D with [facility] [address] [place] [address]) Appt Note: 2D ECHO COMPLETE ADULT (TTE) W OR WO CONTR [[id number]] (Order [id number]  
 [address]  
[phone] [address]  
  
    
 3/27/2018  1:30 PM  
PACEMAKER with PACEMAKER, Jeb [address] [place] [address]) Appt Note: 6mo bsc dcpm  
 [address]  
[phone] [address] Upcoming Health Maintenance Date Due DTaP/Tdap/Td series (1 - Tdap) 1/31/2006 MEDICARE YEARLY EXAM 9/7/2018 GLAUCOMA SCREENING Q2Y 9/21/2018 COLONOSCOPY 9/25/2018 Allergies as of 9/26/2017  Review Complete On: 9/26/2017 By: 91 Bobby Schaeffer MD  
 No Known Allergies Current Immunizations  Reviewed on 9/6/2017 Name Date Influenza High Dose Vaccine PF 9/6/2017, 10/8/2015 Influenza Vaccine 11/15/2014 Pneumococcal Conjugate (PCV-13) 1/28/2015 Pneumococcal Polysaccharide (PPSV-23) 1/30/2006 TD Vaccine 1/30/2006 Zoster 9/13/2010 Not reviewed this visit You Were Diagnosed With   
  
 Codes Comments Essential hypertension    -  Primary ICD-10-CM: I10 
ICD-9-CM: 401.9 CHB (complete heart block) ([facility])     ICD-10-CM: I44.2 ICD-9-CM: 426.0 SSS (sick sinus syndrome) ([facility])     ICD-10-CM: I49.5 ICD-9-CM: 427.81 Syncope and collapse     ICD-10-CM: R55 
ICD-9-CM: 780. 2 Vitals BP Pulse Resp Height(growth percentile) Weight(growth percentile) SpO2  
 92/60 (BP 1 Location: Right arm, BP Patient Position: Standing) 70 18 5' 9\" (1.753 m) 190 lb 9.6 oz (86.5 kg) 99% BMI Smoking Status 28.15 kg/m2 Former Smoker Vitals History BMI and BSA Data Body Mass Index Body Surface Area  
 28.15 kg/m 2 2.05 m 2 Preferred Pharmacy Pharmacy Name Phone [facility] [phone] Your Updated Medication List  
  
   
This list is accurate as of: 9/26/17  2:40 PM.  Always use your most recent med list.  
  
  
  
  
 ADVAIR DISKUS 100-50 mcg/dose diskus inhaler Generic drug:  fluticasone-salmeterol Take 1 puff by inhalation two (2) times a day. aspirin delayed-release 81 mg tablet Take  by mouth daily. ASTELIN 137 mcg (0.1 %) nasal spray Generic drug:  azelastine 1 Spray by Nasal route two (2) times a day. Use in each nostril as directed CLARITIN 10 mg tablet Generic drug:  loratadine Take 10 mg by mouth daily. coenzyme Q-10 30 mg capsule Commonly known as:  CO Q-10 Take 30 mg by mouth three (3) times daily. COSAMIN DS PO Take  by mouth daily. hydrocortisone valerate 0.2 % topical cream  
Commonly known as:  WESTCORT  
  
 ketoconazole 2 % topical cream  
Commonly known as:  NIZORAL MULTIPLE VITAMIN PO Take  by mouth daily. omeprazole 20 mg capsule Commonly known as:  PRILOSEC  
TAKE 1 CAPSULES BY MOUTH DAILY. TO REDUCE STOMACH ACID  
  
 pravastatin 40 mg tablet Commonly known as:  PRAVACHOL  
TAKE 1 TABLET EVERY DAY IN THE NIGHT TO HELP LOWER CHOLESTEROL  
  
 * PROAIR HFA IN Take 2 puffs by inhalation four (4) times daily as needed. * albuterol 2.5 mg /3 mL (0.083 %) nebulizer solution Commonly known as:  PROVENTIL VENTOLIN  
3 mL by Nebulization route every four (4) hours as needed for Wheezing or Shortness of Breath. VITAMIN D3 1,000 unit tablet Generic drug:  cholecalciferol Take  by mouth daily. * Notice:   This list has 2 medication(s) that are the same as other medications prescribed for you. Read the directions carefully, and ask your doctor or other care provider to review them with you. We Performed the Following AMB POC EKG ROUTINE W/ 12 LEADS, INTER & REP [05674 CPT(R)] To-Do List   
 10/13/2017 ECHO:  2D ECHO COMPLETE ADULT (TTE) W OR WO CONTR Introducing [facility] SERVICES! Adele Boateng introduces Meiyou patient portal. Now you can access parts of your medical record, email your doctor's office, and request medication refills online. 1. In your internet browser, go to [email or web address] 2. Click on the First Time User? Click Here link in the Sign In box. You will see the New Member Sign Up page. 3. Enter your Meiyou Access Code exactly as it appears below. You will not need to use this code after youve completed the sign-up process. If you do not sign up before the expiration date, you must request a new code. · Meiyou Access Code: [email or web address] Expires: 12/5/2017 10:31 AM 
 
4. Enter the last four digits of your Social Security Number (xxxx) and Date of Birth (mm/dd/yyyy) as indicated and click Submit. You will be taken to the next sign-up page. 5. Create a Meiyou ID. This will be your Meiyou login ID and cannot be changed, so think of one that is secure and easy to remember. 6. Create a Meiyou password. You can change your password at any time. 7. Enter your Password Reset Question and Answer. This can be used at a later time if you forget your password. 8. Enter your e-mail address. You will receive e-mail notification when new information is available in [address] 9. Click Sign Up. You can now view and download portions of your medical record. 10. Click the Download Summary menu link to download a portable copy of your medical information. If you have questions, please visit the Frequently Asked Questions section of the Meiyou website.  Remember, Meiyou is NOT to be used for urgent needs. For medical emergencies, dial 911. Now available from your iPhone and Android! Please provide this summary of care documentation to your next provider. Your primary care clinician is listed as Bozena Alejandra Dr. If you have any questions after today's visit, please call [phone].

## 2017-10-02 RX ORDER — OMEPRAZOLE 20 MG/1
CAPSULE, DELAYED RELEASE ORAL
Qty: 90 CAP | Refills: 3 | Status: SHIPPED | OUTPATIENT
Start: 2017-10-02 | End: 2018-10-02 | Stop reason: SDUPTHER

## 2017-10-03 ENCOUNTER — CLINICAL SUPPORT (OUTPATIENT)
Dept: CARDIOLOGY CLINIC | Age: 77
End: 2017-10-03

## 2017-10-03 DIAGNOSIS — R55 SYNCOPE AND COLLAPSE: ICD-10-CM

## 2017-10-03 DIAGNOSIS — I10 ESSENTIAL HYPERTENSION: ICD-10-CM

## 2017-10-03 DIAGNOSIS — I49.5 SSS (SICK SINUS SYNDROME) (HCC): ICD-10-CM

## 2017-10-03 DIAGNOSIS — I44.2 CHB (COMPLETE HEART BLOCK) (HCC): ICD-10-CM

## 2017-10-06 ENCOUNTER — TELEPHONE (OUTPATIENT)
Dept: CARDIOLOGY CLINIC | Age: 77
End: 2017-10-06

## 2017-10-06 NOTE — TELEPHONE ENCOUNTER
----- Message from Alejandra Casey MD sent at 10/6/2017  9:42 AM EDT -----  pls let him know that his lvef is wnl    ----- Message -----     From: Antonio Collins Result In     Sent: 10/5/2017   5:24 PM       To:  Alejandra Casey MD

## 2018-02-16 LAB — CREATININE, EXTERNAL: 1.38

## 2018-04-10 ENCOUNTER — CLINICAL SUPPORT (OUTPATIENT)
Dept: CARDIOLOGY CLINIC | Age: 78
End: 2018-04-10

## 2018-04-10 DIAGNOSIS — Z95.0 PACEMAKER: Primary | ICD-10-CM

## 2018-04-10 DIAGNOSIS — I49.5 SSS (SICK SINUS SYNDROME) (HCC): Chronic | ICD-10-CM

## 2018-04-10 DIAGNOSIS — R00.1 BRADYCARDIA: ICD-10-CM

## 2018-07-15 ENCOUNTER — HOSPITAL ENCOUNTER (INPATIENT)
Age: 78
LOS: 3 days | Discharge: NURSING, REHAB OR LONG-TERM CARE | DRG: 352 | End: 2018-07-20
Attending: EMERGENCY MEDICINE | Admitting: FAMILY MEDICINE
Payer: MEDICARE

## 2018-07-15 ENCOUNTER — APPOINTMENT (OUTPATIENT)
Dept: CT IMAGING | Age: 78
DRG: 352 | End: 2018-07-15
Attending: EMERGENCY MEDICINE
Payer: MEDICARE

## 2018-07-15 DIAGNOSIS — K40.30 INCARCERATED LEFT INGUINAL HERNIA: ICD-10-CM

## 2018-07-15 DIAGNOSIS — K40.31 RECURRENT UNILATERAL INGUINAL HERNIA WITH OBSTRUCTION AND WITHOUT GANGRENE: ICD-10-CM

## 2018-07-15 DIAGNOSIS — K56.609 SMALL BOWEL OBSTRUCTION (HCC): Primary | ICD-10-CM

## 2018-07-15 DIAGNOSIS — K40.30 INGUINAL HERNIA OF LEFT SIDE WITH OBSTRUCTION AND WITHOUT GANGRENE: ICD-10-CM

## 2018-07-15 LAB
ALBUMIN SERPL-MCNC: 3.7 G/DL (ref 3.5–5)
ALBUMIN/GLOB SERPL: 1.1 {RATIO} (ref 1.1–2.2)
ALP SERPL-CCNC: 93 U/L (ref 45–117)
ALT SERPL-CCNC: 23 U/L (ref 12–78)
ANION GAP SERPL CALC-SCNC: 9 MMOL/L (ref 5–15)
APPEARANCE UR: CLEAR
AST SERPL-CCNC: 19 U/L (ref 15–37)
BACTERIA URNS QL MICRO: NEGATIVE /HPF
BASOPHILS # BLD: 0 K/UL (ref 0–0.1)
BASOPHILS NFR BLD: 0 % (ref 0–1)
BILIRUB SERPL-MCNC: 0.6 MG/DL (ref 0.2–1)
BILIRUB UR QL: NEGATIVE
BUN SERPL-MCNC: 31 MG/DL (ref 6–20)
BUN/CREAT SERPL: 17 (ref 12–20)
CALCIUM SERPL-MCNC: 9.1 MG/DL (ref 8.5–10.1)
CHLORIDE SERPL-SCNC: 107 MMOL/L (ref 97–108)
CO2 SERPL-SCNC: 23 MMOL/L (ref 21–32)
COLOR UR: ABNORMAL
CREAT SERPL-MCNC: 1.79 MG/DL (ref 0.7–1.3)
DIFFERENTIAL METHOD BLD: ABNORMAL
EOSINOPHIL # BLD: 0.2 K/UL (ref 0–0.4)
EOSINOPHIL NFR BLD: 2 % (ref 0–7)
EPITH CASTS URNS QL MICRO: ABNORMAL /LPF
ERYTHROCYTE [DISTWIDTH] IN BLOOD BY AUTOMATED COUNT: 14 % (ref 11.5–14.5)
GLOBULIN SER CALC-MCNC: 3.3 G/DL (ref 2–4)
GLUCOSE SERPL-MCNC: 108 MG/DL (ref 65–100)
GLUCOSE UR STRIP.AUTO-MCNC: NEGATIVE MG/DL
HCT VFR BLD AUTO: 46.3 % (ref 36.6–50.3)
HGB BLD-MCNC: 15.8 G/DL (ref 12.1–17)
HGB UR QL STRIP: ABNORMAL
IMM GRANULOCYTES # BLD: 0.1 K/UL (ref 0–0.04)
IMM GRANULOCYTES NFR BLD AUTO: 1 % (ref 0–0.5)
KETONES UR QL STRIP.AUTO: NEGATIVE MG/DL
LEUKOCYTE ESTERASE UR QL STRIP.AUTO: NEGATIVE
LYMPHOCYTES # BLD: 0.9 K/UL (ref 0.8–3.5)
LYMPHOCYTES NFR BLD: 8 % (ref 12–49)
MCH RBC QN AUTO: 34 PG (ref 26–34)
MCHC RBC AUTO-ENTMCNC: 34.1 G/DL (ref 30–36.5)
MCV RBC AUTO: 99.6 FL (ref 80–99)
MONOCYTES # BLD: 0.8 K/UL (ref 0–1)
MONOCYTES NFR BLD: 8 % (ref 5–13)
MUCOUS THREADS URNS QL MICRO: ABNORMAL /LPF
NEUTS SEG # BLD: 8.7 K/UL (ref 1.8–8)
NEUTS SEG NFR BLD: 81 % (ref 32–75)
NITRITE UR QL STRIP.AUTO: NEGATIVE
NRBC # BLD: 0 K/UL (ref 0–0.01)
NRBC BLD-RTO: 0 PER 100 WBC
PH UR STRIP: 5 [PH] (ref 5–8)
PLATELET # BLD AUTO: 221 K/UL (ref 150–400)
PMV BLD AUTO: 9.6 FL (ref 8.9–12.9)
POTASSIUM SERPL-SCNC: 4.1 MMOL/L (ref 3.5–5.1)
PROT SERPL-MCNC: 7 G/DL (ref 6.4–8.2)
PROT UR STRIP-MCNC: 100 MG/DL
RBC # BLD AUTO: 4.65 M/UL (ref 4.1–5.7)
RBC #/AREA URNS HPF: ABNORMAL /HPF (ref 0–5)
SODIUM SERPL-SCNC: 139 MMOL/L (ref 136–145)
SP GR UR REFRACTOMETRY: 1.02 (ref 1–1.03)
UA: UC IF INDICATED,UAUC: ABNORMAL
UROBILINOGEN UR QL STRIP.AUTO: 0.2 EU/DL (ref 0.2–1)
WBC # BLD AUTO: 10.8 K/UL (ref 4.1–11.1)
WBC URNS QL MICRO: ABNORMAL /HPF (ref 0–4)

## 2018-07-15 PROCEDURE — 80053 COMPREHEN METABOLIC PANEL: CPT | Performed by: EMERGENCY MEDICINE

## 2018-07-15 PROCEDURE — 36415 COLL VENOUS BLD VENIPUNCTURE: CPT | Performed by: EMERGENCY MEDICINE

## 2018-07-15 PROCEDURE — 96374 THER/PROPH/DIAG INJ IV PUSH: CPT

## 2018-07-15 PROCEDURE — 77030005514 HC CATH URETH FOL14 BARD -A

## 2018-07-15 PROCEDURE — 74011250636 HC RX REV CODE- 250/636: Performed by: EMERGENCY MEDICINE

## 2018-07-15 PROCEDURE — 74177 CT ABD & PELVIS W/CONTRAST: CPT

## 2018-07-15 PROCEDURE — 81001 URINALYSIS AUTO W/SCOPE: CPT | Performed by: EMERGENCY MEDICINE

## 2018-07-15 PROCEDURE — 99285 EMERGENCY DEPT VISIT HI MDM: CPT

## 2018-07-15 PROCEDURE — 84484 ASSAY OF TROPONIN QUANT: CPT | Performed by: EMERGENCY MEDICINE

## 2018-07-15 PROCEDURE — 96361 HYDRATE IV INFUSION ADD-ON: CPT

## 2018-07-15 PROCEDURE — 51702 INSERT TEMP BLADDER CATH: CPT

## 2018-07-15 PROCEDURE — 85025 COMPLETE CBC W/AUTO DIFF WBC: CPT | Performed by: EMERGENCY MEDICINE

## 2018-07-15 RX ORDER — SODIUM CHLORIDE 9 MG/ML
50 INJECTION, SOLUTION INTRAVENOUS
Status: COMPLETED | OUTPATIENT
Start: 2018-07-15 | End: 2018-07-17

## 2018-07-15 RX ORDER — FENTANYL CITRATE 50 UG/ML
50 INJECTION, SOLUTION INTRAMUSCULAR; INTRAVENOUS
Status: COMPLETED | OUTPATIENT
Start: 2018-07-15 | End: 2018-07-15

## 2018-07-15 RX ORDER — SODIUM CHLORIDE 0.9 % (FLUSH) 0.9 %
10 SYRINGE (ML) INJECTION
Status: COMPLETED | OUTPATIENT
Start: 2018-07-15 | End: 2018-07-16

## 2018-07-15 RX ADMIN — FENTANYL CITRATE 50 MCG: 50 INJECTION, SOLUTION INTRAMUSCULAR; INTRAVENOUS at 23:19

## 2018-07-15 RX ADMIN — SODIUM CHLORIDE 1000 ML: 900 INJECTION, SOLUTION INTRAVENOUS at 23:15

## 2018-07-15 NOTE — IP AVS SNAPSHOT
[address] [facility] 
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 
[phone] Caleb Gamez MD  when discharged from [facility] for post hospitalization follow up [address] [facility] [employer] [place] [address] [phone] Delroy Yeung MD  post surgical follow up [address] [facility] 
[phone] Discharge Orders None A check audrey indicates which time of day the medication should be taken. My Medications START taking these medications Instructions Each Dose to Equal  
 Morning Noon Evening Bedtime HYDROcodone-acetaminophen 5-325 mg per tablet Commonly known as:  Chloe Oyster Your last dose was: Your next dose is: Take 1 Tab by mouth every four (4) hours as needed for up to 7 days. Max Daily Amount: 6 Tabs. 1 Tab  
    
   
   
   
  
 ibuprofen 200 mg tablet Commonly known as:  MOTRIN Your last dose was: Your next dose is: Take 1 Tab by mouth three (3) times daily for 14 days. 200 mg  
    
   
   
   
  
 melatonin 3 mg tablet Your last dose was: Your next dose is: Take 1 Tab by mouth nightly. 3 mg CONTINUE taking these medications Instructions Each Dose to Equal  
 Morning Noon Evening Bedtime ADVAIR DISKUS 100-50 mcg/dose diskus inhaler Generic drug:  fluticasone-salmeterol Your last dose was: Your next dose is: Take 1 puff by inhalation two (2) times a day. 1 Puff  
    
   
   
   
  
 aspirin delayed-release 81 mg tablet Your last dose was: Your next dose is: Take  by mouth daily. ASTELIN 137 mcg (0.1 %) nasal spray Generic drug:  azelastine Your last dose was: Your next dose is:    
   
   
 1 Spray by Nasal route two (2) times a day. Use in each nostril as directed 1 Spray CLARITIN 10 mg tablet Generic drug:  loratadine Your last dose was: Your next dose is: Take 10 mg by mouth daily. 10 mg  
    
   
   
   
  
 coenzyme Q-10 30 mg capsule Commonly known as:  CO Q-10 Your last dose was: Your next dose is: Take 30 mg by mouth three (3) times daily. 30 mg  
    
   
   
   
  
 COSAMIN DS PO Your last dose was: Your next dose is: Take  by mouth daily. hydrocortisone valerate 0.2 % topical cream  
Commonly known as:  [employer] Your last dose was: Your next dose is:    
   
   
      
   
   
   
  
 ketoconazole 2 % topical cream  
Commonly known as:  NIZORAL Your last dose was: Your next dose is: MULTIPLE VITAMIN PO Your last dose was: Your next dose is: Take  by mouth daily. omeprazole 20 mg capsule Commonly known as:  PRILOSEC Your last dose was: Your next dose is: TAKE 1 CAPSULES BY MOUTH DAILY. TO REDUCE STOMACH ACID  
     
   
   
   
  
 pravastatin 40 mg tablet Commonly known as:  PRAVACHOL  
   
 Your last dose was: Your next dose is: TAKE 1 TABLET EVERY DAY IN THE NIGHT TO HELP LOWER CHOLESTEROL  
     
   
   
   
  
 * PROAIR HFA IN Your last dose was: Your next dose is: Take 2 puffs by inhalation four (4) times daily as needed. 2 Puff * albuterol 2.5 mg /3 mL (0.083 %) nebulizer solution Commonly known as:  PROVENTIL VENTOLIN Your last dose was: Your next dose is:    
   
   
 3 mL by Nebulization route every four (4) hours as needed for Wheezing or Shortness of Breath. 2.5 mg  
    
   
   
   
  
 VITAMIN D3 1,000 unit tablet Generic drug:  cholecalciferol Your last dose was: Your next dose is: Take  by mouth daily. * Notice: This list has 2 medication(s) that are the same as other medications prescribed for you. Read the directions carefully, and ask your doctor or other care provider to review them with you. Where to Get Your Medications Information on where to get these meds will be given to you by the nurse or doctor. ! Ask your nurse or doctor about these medications HYDROcodone-acetaminophen 5-325 mg per tablet  
 ibuprofen 200 mg tablet  
 melatonin 3 mg tablet Opioid Education Prescription Opioids: What You Need to Know: 
 
Prescription opioids can be used to help relieve moderate-to-severe pain and are often prescribed following a surgery or injury, or for certain health conditions. These medications can be an important part of treatment but also come with serious risks. Opioids are strong pain medicines. Examples include hydrocodone, oxycodone, fentanyl, and morphine. Heroin is an example of an illegal opioid. It is important to work with your health care provider to make sure you are getting the safest, most effective care. WHAT ARE THE RISKS AND SIDE EFFECTS OF OPIOID USE? Prescription opioids carry serious risks of addiction and overdose, especially with prolonged use. An opioid overdose, often marked by slow breathing, can cause sudden death. The use of prescription opioids can have a number of side effects as well, even when taken as directed. · Tolerance-meaning you might need to take more of a medication for the same pain relief · Physical dependence-meaning you have symptoms of withdrawal when the medication is stopped. Withdrawal symptoms can include nausea, sweating, chills, diarrhea, stomach cramps, and muscle aches. Withdrawal can last up to several weeks, depending on which drug you took and how long you took it. · Increased sensitivity to pain · Constipation · Nausea, vomiting, and dry mouth · Sleepiness and dizziness · Confusion · Depression · Low levels of testosterone that can result in lower sex drive, energy, and strength · Itching and sweating RISKS ARE GREATER WITH:      
· History of drug misuse, substance use disorder, or overdose · Mental health conditions (such as depression or anxiety) · Sleep apnea · Older age (72 years or older) · Pregnancy Avoid alcohol while taking prescription opioids. Also, unless specifically advised by your health care provider, medications to avoid include: · Benzodiazepines (such as Xanax or Valium) · Muscle relaxants (such as Soma or Flexeril) · Hypnotics (such as Ambien or Lunesta) · Other prescription opioids KNOW YOUR OPTIONS Talk to your health care provider about ways to manage your pain that don't involve prescription opioids. Some of these options may actually work better and have fewer risks and side effects. Options may include: 
· Pain relievers such as acetaminophen, ibuprofen, and naproxen · Some medications that are also used for depression or seizures · Physical therapy and exercise · Counseling to help patients learn how to cope better with triggers of pain and stress. · Application of heat or cold compress · Massage therapy · Relaxation techniques Be Informed Make sure you know the name of your medication, how much and how often to take it, and its potential risks & side effects. IF YOU ARE PRESCRIBED OPIOIDS FOR PAIN: 
· Never take opioids in greater amounts or more often than prescribed. Remember the goal is not to be pain-free but to manage your pain at a tolerable level. · Follow up with your primary care provider to: · Work together to create a plan on how to manage your pain. · Talk about ways to help manage your pain that don't involve prescription opioids. · Talk about any and all concerns and side effects. · Help prevent misuse and abuse. · Never sell or share prescription opioids · Help prevent misuse and abuse. · Store prescription opioids in a secure place and out of reach of others (this may include visitors, children, friends, and family). · Safely dispose of unused/unwanted prescription opioids: Find your community drug take-back program or your pharmacy mail-back program, or flush them down the toilet, following guidance from the Food and Drug Administration (www.fda.gov/Drugs/ResourcesForYou). · Visit www.cdc.gov/drugoverdose to learn about the risks of opioid abuse and overdose. · If you believe you may be struggling with addiction, tell your health care provider and ask for guidance or call [address] [employer] at 3-226-020-FWRQ. Discharge Instructions Hernia: Care Instructions Your Care Instructions A hernia develops when tissue bulges through a weak spot in the wall of your belly. The groin area and the navel are common areas for a hernia. A hernia can also develop near the area of a surgery you had before. Pressure from lifting, straining, or coughing can tear the weak area, causing the hernia to bulge and be painful. If you cannot push a hernia back into place, the tissue may become trapped outside the belly wall. If the hernia gets twisted and loses its blood supply, it will swell and die. This is called a strangulated hernia. It usually causes a lot of pain. It needs treatment right away. Some hernias need to be repaired to prevent a strangulated hernia. If your hernia causes symptoms or is large, you may need surgery. Follow-up care is a key part of your treatment and safety. Be sure to make and go to all appointments, and call your doctor if you are having problems. It's also a good idea to know your test results and keep a list of the medicines you take. How can you care for yourself at home? · Take care when lifting heavy objects. · Stay at a healthy weight. · Do not smoke. Smoking can cause coughing, which can cause your hernia to bulge. If you need help quitting, talk to your doctor about stop-smoking programs and medicines. These can increase your chances of quitting for good. · Talk with your doctor before wearing a corset or truss for a hernia. These devices are not recommended for treating hernias and sometimes can do more harm than good. There may be certain situations when your doctor thinks a truss would work, but these are rare. When should you call for help? Call your doctor now or seek immediate medical care if: 
  · You have new or worse belly pain.  
  · You are vomiting.  
  · You cannot pass stools or gas.  
  · You cannot push the hernia back into place with gentle pressure when you are lying down.  
  · The area over the hernia turns red or becomes tender.  
 Watch closely for changes in your health, and be sure to contact your doctor if you have any problems. Where can you learn more? Go to http://sean-sunitha.info/. Enter C129 in the search box to learn more about \"Hernia: Care Instructions. \" Current as of: May 12, 2017 Content Version: 11.7 © 8484-0801 Healthwise, Incorporated. Care instructions adapted under license by [employer] (which disclaims liability or warranty for this information). If you have questions about a medical condition or this instruction, always ask your healthcare professional. [address] any warranty or liability for your use of this information. ACO Transitions of Care Introducing [employer] [address] offers a voluntary care coordination program to provide high quality service and care to [facility] fee-for-service beneficiaries. Enriqueta Chang was designed to help you enhance your health and well-being through the following services: ? Transitions of Care  support for individuals who are transitioning from one care setting to another (example: Hospital to home). ? Chronic and Complex Care Coordination  support for individuals and caregivers of those with serious or chronic illnesses or with more than one chronic (ongoing) condition and those who take a number of different medications. If you meet specific medical criteria, a [facility] [facility] [address] may call you directly to coordinate your care with your primary care physician and your other care providers. For questions about the [facility] programs, please, contact your physicians office. For general questions or additional information about Accountable Care Organizations: 
Please visit www.medicare.gov/acos. html or call 1-800-MEDICARE (9-679.872.4127) TTY users should call 2-533.141.3296. [employer] Announcement We are excited to announce that we are making your provider's discharge notes available to you in [employer]hart. You will see these notes when they are completed and signed by the physician that discharged you from your recent hospital stay.   If you have any questions or concerns about any information you see in Impeva, please call the Health Information Department where you were seen or reach out to your Primary Care Provider for more information about your plan of care. Introducing [facility] HE[facility]! Lamar Alas introduces Impeva patient portal. Now you can access parts of your medical record, email your doctor's office, and request medication refills online. 1. In your internet browser, go to [email or web address] 2. Click on the First Time User? Click Here link in the Sign In box. You will see the New Member Sign Up page. 3. Enter your Impeva Access Code exactly as it appears below. You will not need to use this code after youve completed the sign-up process. If you do not sign up before the expiration date, you must request a new code. · Impeva Access Code: [email or web address] Expires: 10/13/2018  9:02 PM 
 
4. Enter the last four digits of your Social Security Number (xxxx) and Date of Birth (mm/dd/yyyy) as indicated and click Submit. You will be taken to the next sign-up page. 5. Create a Impeva ID. This will be your Impeva login ID and cannot be changed, so think of one that is secure and easy to remember. 6. Create a Impeva password. You can change your password at any time. 7. Enter your Password Reset Question and Answer. This can be used at a later time if you forget your password. 8. Enter your e-mail address. You will receive e-mail notification when new information is available in [address]. 9. Click Sign Up. You can now view and download portions of your medical record. 10. Click the Download Summary menu link to download a portable copy of your medical information. If you have questions, please visit the Frequently Asked Questions section of the Impeva website. Remember, Impeva is NOT to be used for urgent needs. For medical emergencies, dial 911. Now available from your iPhone and Android! Introducing Nadir Brandon As a [facility] patient, I wanted to make you aware of our electronic visit tool called Nadir Brandon. IntroBridge 24/7 allows you to connect within minutes with a medical provider 24 hours a day, seven days a week via a mobile device or tablet or logging into a secure website from your computer. You can access Nadir Hjaifin from anywhere in the [place]. A virtual visit might be right for you when you have a simple condition and feel like you just dont want to get out of bed, or cant get away from work for an appointment, when your regular [facility] provider is not available (evenings, weekends or holidays), or when youre out of town and need minor care. Electronic visits cost only $49 and if the [facility] 24/7 provider determines a prescription is needed to treat your condition, one can be electronically transmitted to a nearby pharmacy*. Please take a moment to enroll today if you have not already done so. The enrollment process is free and takes just a few minutes. To enroll, please download the IntroBridge 24/7 amrita to your tablet or phone, or visit [email or web address] to enroll on your computer. And, as an [address] [place] [address] patient with a [employer] account, the results of your visits will be scanned into your electronic medical record and your primary care provider will be able to view the scanned results. We urge you to continue to see your regular [facility] provider for your ongoing medical care. And while your primary care provider may not be the one available when you seek a Nadir Brandon virtual visit, the peace of mind you get from getting a real diagnosis real time can be priceless. For more information on Nadir Codysarafin, view our Frequently Asked Questions (FAQs) at [email or web address]. Sincerely, 
 
Lucina Wilson MD 
Chief Medical Officer [address] *:  certain medications cannot be prescribed via Nadir Brandon Providers Seen During Your Hospitalization Provider Specialty Primary office phone Cordelia Lamar MD Emergency Medicine [phone] Josemanuel Ha MD General Surgery [phone] Your Primary Care Physician (PCP) Primary Care Physician Office Phone Office Fax [facility] Mail [phone] You are allergic to the following No active allergies Recent Documentation Height Weight BMI Smoking Status 1.778 m 85 kg 26.89 kg/m2 Former Smoker Emergency Contacts Name Discharge Info Relation Home Work Fara Farr DISCHARGE CAREGIVER [3] Child [2] [phone] Jess Treviño DISCHARGE CAREGIVER [3] Daughter [21] [phone] Patient Belongings The following personal items are in your possession at time of discharge: 
  Dental Appliances: None  Visual Aid: At bedside, Glasses   Hearing Aids/Status: Left, Right Please provide this summary of care documentation to your next provider. Signatures-by signing, you are acknowledging that this After Visit Summary has been reviewed with you and you have received a copy. Patient Signature:  ____________________________________________________________ Date:  ____________________________________________________________  
  
Josph Perfect Provider Signature:  ____________________________________________________________ Date:  ____________________________________________________________

## 2018-07-16 ENCOUNTER — ANESTHESIA EVENT (OUTPATIENT)
Dept: SURGERY | Age: 78
DRG: 352 | End: 2018-07-16
Payer: MEDICARE

## 2018-07-16 ENCOUNTER — ANESTHESIA (OUTPATIENT)
Dept: SURGERY | Age: 78
DRG: 352 | End: 2018-07-16
Payer: MEDICARE

## 2018-07-16 PROBLEM — K40.30 UNILATERAL INGUINAL HERNIA WITH OBSTRUCTION AND WITHOUT GANGRENE: Status: ACTIVE | Noted: 2018-07-16

## 2018-07-16 PROBLEM — K40.31 RECURRENT UNILATERAL INGUINAL HERNIA WITH OBSTRUCTION AND WITHOUT GANGRENE: Status: ACTIVE | Noted: 2018-07-16

## 2018-07-16 PROBLEM — K40.30 NON-RECURRENT UNILATERAL INGUINAL HERNIA WITH OBSTRUCTION WITHOUT GANGRENE: Status: ACTIVE | Noted: 2018-07-16

## 2018-07-16 LAB
ATRIAL RATE: 81 BPM
CALCULATED P AXIS, ECG09: 77 DEGREES
CALCULATED R AXIS, ECG10: -40 DEGREES
CALCULATED T AXIS, ECG11: -61 DEGREES
DIAGNOSIS, 93000: NORMAL
LACTATE SERPL-SCNC: 1.4 MMOL/L (ref 0.4–2)
P-R INTERVAL, ECG05: 234 MS
Q-T INTERVAL, ECG07: 388 MS
QRS DURATION, ECG06: 90 MS
QTC CALCULATION (BEZET), ECG08: 450 MS
TROPONIN I SERPL-MCNC: <0.05 NG/ML
VENTRICULAR RATE, ECG03: 81 BPM

## 2018-07-16 PROCEDURE — 77030012406 HC DRN WND PENRS BARD -A: Performed by: SURGERY

## 2018-07-16 PROCEDURE — 74011250636 HC RX REV CODE- 250/636: Performed by: SURGERY

## 2018-07-16 PROCEDURE — 74011636320 HC RX REV CODE- 636/320: Performed by: EMERGENCY MEDICINE

## 2018-07-16 PROCEDURE — 93005 ELECTROCARDIOGRAM TRACING: CPT

## 2018-07-16 PROCEDURE — 88302 TISSUE EXAM BY PATHOLOGIST: CPT | Performed by: SURGERY

## 2018-07-16 PROCEDURE — 74011250636 HC RX REV CODE- 250/636

## 2018-07-16 PROCEDURE — 77030002996 HC SUT SLK J&J -A: Performed by: SURGERY

## 2018-07-16 PROCEDURE — 76010000153 HC OR TIME 1.5 TO 2 HR: Performed by: SURGERY

## 2018-07-16 PROCEDURE — 77030031139 HC SUT VCRL2 J&J -A: Performed by: SURGERY

## 2018-07-16 PROCEDURE — 74011000258 HC RX REV CODE- 258: Performed by: SURGERY

## 2018-07-16 PROCEDURE — 99218 HC RM OBSERVATION: CPT

## 2018-07-16 PROCEDURE — 77030034850: Performed by: SURGERY

## 2018-07-16 PROCEDURE — 74011000250 HC RX REV CODE- 250

## 2018-07-16 PROCEDURE — 77030026438 HC STYL ET INTUB CARD -A: Performed by: NURSE ANESTHETIST, CERTIFIED REGISTERED

## 2018-07-16 PROCEDURE — 77030018547 HC SUT ETHBND1 J&J -B: Performed by: SURGERY

## 2018-07-16 PROCEDURE — 77030020061 HC IV BLD WRMR ADMIN SET 3M -B: Performed by: NURSE ANESTHETIST, CERTIFIED REGISTERED

## 2018-07-16 PROCEDURE — 77030002986 HC SUT PROL J&J -A: Performed by: SURGERY

## 2018-07-16 PROCEDURE — 77030011267 HC ELECTRD BLD COVD -A: Performed by: SURGERY

## 2018-07-16 PROCEDURE — 77030011640 HC PAD GRND REM COVD -A: Performed by: SURGERY

## 2018-07-16 PROCEDURE — 77030019908 HC STETH ESOPH SIMS -A: Performed by: NURSE ANESTHETIST, CERTIFIED REGISTERED

## 2018-07-16 PROCEDURE — 36415 COLL VENOUS BLD VENIPUNCTURE: CPT | Performed by: EMERGENCY MEDICINE

## 2018-07-16 PROCEDURE — 77030021678 HC GLIDESCP STAT DISP VERT -B: Performed by: NURSE ANESTHETIST, CERTIFIED REGISTERED

## 2018-07-16 PROCEDURE — 76210000006 HC OR PH I REC 0.5 TO 1 HR: Performed by: SURGERY

## 2018-07-16 PROCEDURE — 74011250636 HC RX REV CODE- 250/636: Performed by: EMERGENCY MEDICINE

## 2018-07-16 PROCEDURE — C1781 MESH (IMPLANTABLE): HCPCS | Performed by: SURGERY

## 2018-07-16 PROCEDURE — 96376 TX/PRO/DX INJ SAME DRUG ADON: CPT

## 2018-07-16 PROCEDURE — 83605 ASSAY OF LACTIC ACID: CPT | Performed by: EMERGENCY MEDICINE

## 2018-07-16 PROCEDURE — 74011000250 HC RX REV CODE- 250: Performed by: SURGERY

## 2018-07-16 PROCEDURE — 77030032490 HC SLV COMPR SCD KNE COVD -B: Performed by: SURGERY

## 2018-07-16 PROCEDURE — 77030002933 HC SUT MCRYL J&J -A: Performed by: SURGERY

## 2018-07-16 PROCEDURE — 74011250637 HC RX REV CODE- 250/637: Performed by: SURGERY

## 2018-07-16 PROCEDURE — 77030008684 HC TU ET CUF COVD -B: Performed by: NURSE ANESTHETIST, CERTIFIED REGISTERED

## 2018-07-16 PROCEDURE — 76060000034 HC ANESTHESIA 1.5 TO 2 HR: Performed by: SURGERY

## 2018-07-16 PROCEDURE — 77030039266 HC ADH SKN EXOFIN S2SG -A: Performed by: SURGERY

## 2018-07-16 PROCEDURE — 0YU64JZ SUPPLEMENT LEFT INGUINAL REGION WITH SYNTHETIC SUBSTITUTE, PERCUTANEOUS ENDOSCOPIC APPROACH: ICD-10-PCS | Performed by: SURGERY

## 2018-07-16 PROCEDURE — 77030008771 HC TU NG SALEM SUMP -A: Performed by: NURSE ANESTHETIST, CERTIFIED REGISTERED

## 2018-07-16 DEVICE — MESH SURG W3.5XL6IN POLY SELF FIXATING RECT W/ RESRB PLA: Type: IMPLANTABLE DEVICE | Site: GROIN | Status: FUNCTIONAL

## 2018-07-16 RX ORDER — SODIUM CHLORIDE, SODIUM LACTATE, POTASSIUM CHLORIDE, CALCIUM CHLORIDE 600; 310; 30; 20 MG/100ML; MG/100ML; MG/100ML; MG/100ML
25 INJECTION, SOLUTION INTRAVENOUS CONTINUOUS
Status: DISCONTINUED | OUTPATIENT
Start: 2018-07-16 | End: 2018-07-16 | Stop reason: HOSPADM

## 2018-07-16 RX ORDER — LIDOCAINE HYDROCHLORIDE 10 MG/ML
0.1 INJECTION, SOLUTION EPIDURAL; INFILTRATION; INTRACAUDAL; PERINEURAL AS NEEDED
Status: DISCONTINUED | OUTPATIENT
Start: 2018-07-16 | End: 2018-07-16 | Stop reason: HOSPADM

## 2018-07-16 RX ORDER — FENTANYL CITRATE 50 UG/ML
50 INJECTION, SOLUTION INTRAMUSCULAR; INTRAVENOUS AS NEEDED
Status: DISCONTINUED | OUTPATIENT
Start: 2018-07-16 | End: 2018-07-16 | Stop reason: HOSPADM

## 2018-07-16 RX ORDER — SODIUM CHLORIDE 0.9 % (FLUSH) 0.9 %
5-10 SYRINGE (ML) INJECTION AS NEEDED
Status: DISCONTINUED | OUTPATIENT
Start: 2018-07-16 | End: 2018-07-20 | Stop reason: HOSPADM

## 2018-07-16 RX ORDER — FENTANYL CITRATE 50 UG/ML
50 INJECTION, SOLUTION INTRAMUSCULAR; INTRAVENOUS
Status: COMPLETED | OUTPATIENT
Start: 2018-07-16 | End: 2018-07-16

## 2018-07-16 RX ORDER — HYDROMORPHONE HYDROCHLORIDE 2 MG/ML
0.5 INJECTION, SOLUTION INTRAMUSCULAR; INTRAVENOUS; SUBCUTANEOUS
Status: DISCONTINUED | OUTPATIENT
Start: 2018-07-16 | End: 2018-07-18

## 2018-07-16 RX ORDER — SODIUM CHLORIDE, SODIUM LACTATE, POTASSIUM CHLORIDE, CALCIUM CHLORIDE 600; 310; 30; 20 MG/100ML; MG/100ML; MG/100ML; MG/100ML
INJECTION, SOLUTION INTRAVENOUS
Status: DISCONTINUED | OUTPATIENT
Start: 2018-07-16 | End: 2018-07-16 | Stop reason: HOSPADM

## 2018-07-16 RX ORDER — FLUTICASONE FUROATE AND VILANTEROL 100; 25 UG/1; UG/1
1 POWDER RESPIRATORY (INHALATION) DAILY
Status: DISCONTINUED | OUTPATIENT
Start: 2018-07-16 | End: 2018-07-20 | Stop reason: HOSPADM

## 2018-07-16 RX ORDER — ONDANSETRON 2 MG/ML
INJECTION INTRAMUSCULAR; INTRAVENOUS AS NEEDED
Status: DISCONTINUED | OUTPATIENT
Start: 2018-07-16 | End: 2018-07-16 | Stop reason: HOSPADM

## 2018-07-16 RX ORDER — LIDOCAINE HYDROCHLORIDE 20 MG/ML
INJECTION, SOLUTION EPIDURAL; INFILTRATION; INTRACAUDAL; PERINEURAL AS NEEDED
Status: DISCONTINUED | OUTPATIENT
Start: 2018-07-16 | End: 2018-07-16 | Stop reason: HOSPADM

## 2018-07-16 RX ORDER — NEOSTIGMINE METHYLSULFATE 1 MG/ML
INJECTION INTRAVENOUS AS NEEDED
Status: DISCONTINUED | OUTPATIENT
Start: 2018-07-16 | End: 2018-07-16 | Stop reason: HOSPADM

## 2018-07-16 RX ORDER — FENTANYL CITRATE 50 UG/ML
25 INJECTION, SOLUTION INTRAMUSCULAR; INTRAVENOUS
Status: DISCONTINUED | OUTPATIENT
Start: 2018-07-16 | End: 2018-07-16 | Stop reason: HOSPADM

## 2018-07-16 RX ORDER — DEXTROSE, SODIUM CHLORIDE, AND POTASSIUM CHLORIDE 5; .45; .15 G/100ML; G/100ML; G/100ML
50 INJECTION INTRAVENOUS CONTINUOUS
Status: DISCONTINUED | OUTPATIENT
Start: 2018-07-16 | End: 2018-07-19

## 2018-07-16 RX ORDER — DIPHENHYDRAMINE HYDROCHLORIDE 50 MG/ML
12.5 INJECTION, SOLUTION INTRAMUSCULAR; INTRAVENOUS AS NEEDED
Status: DISCONTINUED | OUTPATIENT
Start: 2018-07-16 | End: 2018-07-16 | Stop reason: HOSPADM

## 2018-07-16 RX ORDER — MORPHINE SULFATE 10 MG/ML
2 INJECTION, SOLUTION INTRAMUSCULAR; INTRAVENOUS
Status: DISCONTINUED | OUTPATIENT
Start: 2018-07-16 | End: 2018-07-16 | Stop reason: HOSPADM

## 2018-07-16 RX ORDER — PROPOFOL 10 MG/ML
INJECTION, EMULSION INTRAVENOUS AS NEEDED
Status: DISCONTINUED | OUTPATIENT
Start: 2018-07-16 | End: 2018-07-16 | Stop reason: HOSPADM

## 2018-07-16 RX ORDER — DEXAMETHASONE SODIUM PHOSPHATE 4 MG/ML
INJECTION, SOLUTION INTRA-ARTICULAR; INTRALESIONAL; INTRAMUSCULAR; INTRAVENOUS; SOFT TISSUE AS NEEDED
Status: DISCONTINUED | OUTPATIENT
Start: 2018-07-16 | End: 2018-07-16 | Stop reason: HOSPADM

## 2018-07-16 RX ORDER — ONDANSETRON 2 MG/ML
4 INJECTION INTRAMUSCULAR; INTRAVENOUS
Status: DISCONTINUED | OUTPATIENT
Start: 2018-07-16 | End: 2018-07-20 | Stop reason: HOSPADM

## 2018-07-16 RX ORDER — ALBUTEROL SULFATE 0.83 MG/ML
2.5 SOLUTION RESPIRATORY (INHALATION)
Status: DISCONTINUED | OUTPATIENT
Start: 2018-07-16 | End: 2018-07-20 | Stop reason: HOSPADM

## 2018-07-16 RX ORDER — PANTOPRAZOLE SODIUM 40 MG/1
40 TABLET, DELAYED RELEASE ORAL DAILY
Status: DISCONTINUED | OUTPATIENT
Start: 2018-07-16 | End: 2018-07-20 | Stop reason: HOSPADM

## 2018-07-16 RX ORDER — BUPIVACAINE HYDROCHLORIDE 5 MG/ML
INJECTION, SOLUTION EPIDURAL; INTRACAUDAL AS NEEDED
Status: DISCONTINUED | OUTPATIENT
Start: 2018-07-16 | End: 2018-07-16 | Stop reason: HOSPADM

## 2018-07-16 RX ORDER — SODIUM CHLORIDE 0.9 % (FLUSH) 0.9 %
5-10 SYRINGE (ML) INJECTION EVERY 8 HOURS
Status: DISCONTINUED | OUTPATIENT
Start: 2018-07-16 | End: 2018-07-20 | Stop reason: HOSPADM

## 2018-07-16 RX ORDER — ONDANSETRON 2 MG/ML
4 INJECTION INTRAMUSCULAR; INTRAVENOUS AS NEEDED
Status: DISCONTINUED | OUTPATIENT
Start: 2018-07-16 | End: 2018-07-16 | Stop reason: HOSPADM

## 2018-07-16 RX ORDER — HYDROCODONE BITARTRATE AND ACETAMINOPHEN 5; 325 MG/1; MG/1
1 TABLET ORAL
Status: DISCONTINUED | OUTPATIENT
Start: 2018-07-16 | End: 2018-07-20 | Stop reason: HOSPADM

## 2018-07-16 RX ORDER — ROCURONIUM BROMIDE 10 MG/ML
INJECTION, SOLUTION INTRAVENOUS AS NEEDED
Status: DISCONTINUED | OUTPATIENT
Start: 2018-07-16 | End: 2018-07-16 | Stop reason: HOSPADM

## 2018-07-16 RX ORDER — ACETAMINOPHEN 10 MG/ML
INJECTION, SOLUTION INTRAVENOUS AS NEEDED
Status: DISCONTINUED | OUTPATIENT
Start: 2018-07-16 | End: 2018-07-16 | Stop reason: HOSPADM

## 2018-07-16 RX ORDER — FENTANYL CITRATE 50 UG/ML
INJECTION, SOLUTION INTRAMUSCULAR; INTRAVENOUS AS NEEDED
Status: DISCONTINUED | OUTPATIENT
Start: 2018-07-16 | End: 2018-07-16 | Stop reason: HOSPADM

## 2018-07-16 RX ORDER — ALBUTEROL SULFATE 90 UG/1
1 AEROSOL, METERED RESPIRATORY (INHALATION) DAILY
Status: DISCONTINUED | OUTPATIENT
Start: 2018-07-16 | End: 2018-07-20 | Stop reason: HOSPADM

## 2018-07-16 RX ORDER — HYDROMORPHONE HYDROCHLORIDE 2 MG/ML
INJECTION, SOLUTION INTRAMUSCULAR; INTRAVENOUS; SUBCUTANEOUS AS NEEDED
Status: DISCONTINUED | OUTPATIENT
Start: 2018-07-16 | End: 2018-07-16 | Stop reason: HOSPADM

## 2018-07-16 RX ORDER — MIDAZOLAM HYDROCHLORIDE 1 MG/ML
1 INJECTION, SOLUTION INTRAMUSCULAR; INTRAVENOUS AS NEEDED
Status: DISCONTINUED | OUTPATIENT
Start: 2018-07-16 | End: 2018-07-16 | Stop reason: HOSPADM

## 2018-07-16 RX ORDER — SUCCINYLCHOLINE CHLORIDE 20 MG/ML
INJECTION INTRAMUSCULAR; INTRAVENOUS AS NEEDED
Status: DISCONTINUED | OUTPATIENT
Start: 2018-07-16 | End: 2018-07-16 | Stop reason: HOSPADM

## 2018-07-16 RX ORDER — DEXTROSE, SODIUM CHLORIDE, AND POTASSIUM CHLORIDE 5; .45; .15 G/100ML; G/100ML; G/100ML
INJECTION INTRAVENOUS
Status: COMPLETED
Start: 2018-07-16 | End: 2018-07-16

## 2018-07-16 RX ORDER — GLYCOPYRROLATE 0.2 MG/ML
INJECTION INTRAMUSCULAR; INTRAVENOUS AS NEEDED
Status: DISCONTINUED | OUTPATIENT
Start: 2018-07-16 | End: 2018-07-16 | Stop reason: HOSPADM

## 2018-07-16 RX ADMIN — DEXTROSE MONOHYDRATE, SODIUM CHLORIDE, AND POTASSIUM CHLORIDE 125 ML/HR: 50; 4.5; 1.49 INJECTION, SOLUTION INTRAVENOUS at 22:48

## 2018-07-16 RX ADMIN — ROCURONIUM BROMIDE 5 MG: 10 INJECTION, SOLUTION INTRAVENOUS at 02:50

## 2018-07-16 RX ADMIN — ACETAMINOPHEN 1000 MG: 10 INJECTION, SOLUTION INTRAVENOUS at 03:09

## 2018-07-16 RX ADMIN — SODIUM CHLORIDE, SODIUM LACTATE, POTASSIUM CHLORIDE, CALCIUM CHLORIDE: 600; 310; 30; 20 INJECTION, SOLUTION INTRAVENOUS at 02:44

## 2018-07-16 RX ADMIN — SODIUM CHLORIDE, SODIUM LACTATE, POTASSIUM CHLORIDE, CALCIUM CHLORIDE: 600; 310; 30; 20 INJECTION, SOLUTION INTRAVENOUS at 04:13

## 2018-07-16 RX ADMIN — ROCURONIUM BROMIDE 10 MG: 10 INJECTION, SOLUTION INTRAVENOUS at 03:27

## 2018-07-16 RX ADMIN — Medication 10 ML: at 05:50

## 2018-07-16 RX ADMIN — FLUTICASONE FUROATE AND VILANTEROL TRIFENATATE 1 PUFF: 100; 25 POWDER RESPIRATORY (INHALATION) at 09:58

## 2018-07-16 RX ADMIN — Medication 10 ML: at 00:04

## 2018-07-16 RX ADMIN — Medication 10 ML: at 14:47

## 2018-07-16 RX ADMIN — GLYCOPYRROLATE 0.4 MG: 0.2 INJECTION INTRAMUSCULAR; INTRAVENOUS at 04:12

## 2018-07-16 RX ADMIN — FENTANYL CITRATE 100 MCG: 50 INJECTION, SOLUTION INTRAMUSCULAR; INTRAVENOUS at 02:50

## 2018-07-16 RX ADMIN — PROPOFOL 150 MG: 10 INJECTION, EMULSION INTRAVENOUS at 02:50

## 2018-07-16 RX ADMIN — DEXTROSE MONOHYDRATE, SODIUM CHLORIDE, AND POTASSIUM CHLORIDE 125 ML/HR: 50; 4.5; 1.49 INJECTION, SOLUTION INTRAVENOUS at 05:00

## 2018-07-16 RX ADMIN — ONDANSETRON 4 MG: 2 INJECTION INTRAMUSCULAR; INTRAVENOUS at 04:09

## 2018-07-16 RX ADMIN — DEXAMETHASONE SODIUM PHOSPHATE 8 MG: 4 INJECTION, SOLUTION INTRA-ARTICULAR; INTRALESIONAL; INTRAMUSCULAR; INTRAVENOUS; SOFT TISSUE at 03:16

## 2018-07-16 RX ADMIN — Medication 10 ML: at 22:49

## 2018-07-16 RX ADMIN — ALBUTEROL SULFATE 1 PUFF: 90 AEROSOL, METERED RESPIRATORY (INHALATION) at 09:58

## 2018-07-16 RX ADMIN — HYDROMORPHONE HYDROCHLORIDE 0.5 MG: 2 INJECTION, SOLUTION INTRAMUSCULAR; INTRAVENOUS; SUBCUTANEOUS at 04:24

## 2018-07-16 RX ADMIN — ROCURONIUM BROMIDE 25 MG: 10 INJECTION, SOLUTION INTRAVENOUS at 03:02

## 2018-07-16 RX ADMIN — DEXTROSE MONOHYDRATE, SODIUM CHLORIDE, AND POTASSIUM CHLORIDE 125 ML/HR: 50; 4.5; 1.49 INJECTION, SOLUTION INTRAVENOUS at 12:23

## 2018-07-16 RX ADMIN — LIDOCAINE HYDROCHLORIDE 60 MG: 20 INJECTION, SOLUTION EPIDURAL; INFILTRATION; INTRACAUDAL; PERINEURAL at 02:50

## 2018-07-16 RX ADMIN — PANTOPRAZOLE SODIUM 40 MG: 40 TABLET, DELAYED RELEASE ORAL at 08:33

## 2018-07-16 RX ADMIN — NEOSTIGMINE METHYLSULFATE 3 MG: 1 INJECTION INTRAVENOUS at 04:12

## 2018-07-16 RX ADMIN — FENTANYL CITRATE 50 MCG: 50 INJECTION, SOLUTION INTRAMUSCULAR; INTRAVENOUS at 04:22

## 2018-07-16 RX ADMIN — SODIUM CHLORIDE 50 ML/HR: 900 INJECTION, SOLUTION INTRAVENOUS at 00:04

## 2018-07-16 RX ADMIN — FENTANYL CITRATE 50 MCG: 50 INJECTION, SOLUTION INTRAMUSCULAR; INTRAVENOUS at 02:14

## 2018-07-16 RX ADMIN — IOPAMIDOL 100 ML: 755 INJECTION, SOLUTION INTRAVENOUS at 00:04

## 2018-07-16 RX ADMIN — CEFOXITIN 2 G: 2 INJECTION, POWDER, FOR SOLUTION INTRAVENOUS at 03:05

## 2018-07-16 RX ADMIN — SUCCINYLCHOLINE CHLORIDE 140 MG: 20 INJECTION INTRAMUSCULAR; INTRAVENOUS at 02:50

## 2018-07-16 NOTE — INTERDISCIPLINARY ROUNDS
Interdisciplinary Rounds were completed on this patient. Rounds included nursing, clinical care leader, pharmacy, and case management.      Plan for the day: pain control, salinas catheter care, up in chair   Plan for the stay: continue current [facility] in [facility]   Anticipated discharge date: [place] home Wednesday

## 2018-07-16 NOTE — PROGRESS NOTES
Surgery      Hernia repair by Dr Nilsa Engle in early morning hours    Pt very hard of hearing    PT to work with patient    Candace atleast til AM      Dwain Macias.  Dennie Frees  [address] [address] [facility]

## 2018-07-16 NOTE — PERIOP NOTES
Handoff Report from Operating Room to PACU    Report received from Tricia Garcia RN and FITO Ba CRNA regarding Kevin Antunez. Surgeon(s):  Jennifer Felder MD  And Procedure(s) (LRB):  LEFT INGUINAL HERNIA  REPAIR (Left)  confirmed   with allergies, dressings and local anesthetic discussed. Anesthesia type, drugs, patient history, complications, estimated blood loss, vital signs, intake and output, and last pain medication, lines, reversal medications and temperature were reviewed.

## 2018-07-16 NOTE — PROGRESS NOTES
CM met with family after discussion with bedside nurse. Family has concerns taking pt home from hospital. Pt has not needed SNF in past. Pt has stayed at [address] [place] [address] in past. Pt has 2 daughters who live across the street from him. His son is in transition of moving and has been staying and can stay with pt. Temporarily. PT/OT consults placed. SNF list given to family to review. CM will follow for recommendations and will meet with family to discuss plan.     Anup Byrd, CARLYNN, RN  Care Manager [address]  [phone]

## 2018-07-16 NOTE — BRIEF OP NOTE
BRIEF OPERATIVE NOTE    Date of Procedure: 7/16/2018   Preoperative Diagnosis: incarcerated hernia with bowel obstruction  Postoperative Diagnosis: incarcerated sliding direct left inguinal hernia containing bowel and bladder   Procedure(s):  LEFT INGUINAL HERNIA  REPAIR  Surgeon(s) and Role:     * Terra Taylor MD - Primary         Surgical Assistant: none    Surgical Staff:  Circ-1: Ray Ortiz RN  Scrub Tech-1: Tejas Salgado  Surg Asst-1: Jhonmaia Sissy Liggijohn  Event Time In   Incision Start 0310   Incision Close      Anesthesia: General   Estimated Blood Loss: 25 cc  Specimens:   ID Type Source Tests Collected by Time Destination   1 : LEFT INGUINAL HERNIA [facility] Preservative Groin  Terra Taylor MD 7/16/2018 2069 Pathology      Findings: incarcerated sliding direct left inguinal hernia containing bowel and bladder   Complications: none  Implants:   Implant Name Type Inv.  Item Serial No.  Lot No. LRB No. Used Action   MESH POLYSTR SLF- 15X9 CM -- PROGRIP - [id number]   MESH POLYSTR SLF- 15X9 CM -- PROGRIP [id number] [facility] SURGICAL [id number] Left 1 Implanted

## 2018-07-16 NOTE — ED PROVIDER NOTES
EMERGENCY DEPARTMENT HISTORY AND PHYSICAL EXAM  
     
 
Date: 7/15/2018 Patient Name: Victorina Bowen History of Presenting Illness Chief Complaint Patient presents with  Urinary Retention  
  x 24 hours History Provided By: Patient and family HPI: Victorina Bowen is a 66 y.o. male, pmhx GERD / CAD / COPD / CKD / hernia, who presents ambulatory to the ED c/o gradually worsening urinary retention with associated suprapubic abd pain that began over the last 24 hours. Pt reports having a BM this morning that was normal per his baseline this morning. He denies any hx of similar sxs. Pt denies any recent medications for his current sxs. He denies any previous follow up with a urologist. Pt additionally reports having a L inguinal hernia; he states it is usually reducible in order to void, but notes he wasn't able to reduce it today. Pt otherwise specifically denies any recent fevers, chills, nausea, vomiting, diarrhea, CP, SOB, changes in BM, or headache. PCP: Theora Alpers, MD 
Cardiology: Emilia Beard Nephrology: [place] Allergies: NKDA PMHx: Significant for arthritis, GERD, CAD, HLD, COPD, CKD, shingles, schatzki's ring PSHx: Significant for EGD, colonoscopy, pacemaker implantation, L inguinal hernia repair Social Hx: -tobacco (former), -EtOH, -Illicit Drugs There are no other complaints, changes, or physical findings at this time. Current Facility-Administered Medications Medication Dose Route Frequency Provider Last Rate Last Dose  fentaNYL citrate (PF) injection 50 mcg  50 mcg IntraVENous NOW Codie Richardson MD      
 
Current Outpatient Prescriptions Medication Sig Dispense Refill  omeprazole (PRILOSEC) 20 mg capsule TAKE 1 CAPSULES BY MOUTH DAILY. TO REDUCE STOMACH ACID 90 Cap 3  
 aspirin delayed-release 81 mg tablet Take  by mouth daily.  pravastatin (PRAVACHOL) 40 mg tablet TAKE 1 TABLET EVERY DAY IN THE NIGHT TO HELP LOWER CHOLESTEROL 30 Tab 12  ketoconazole (NIZORAL) 2 % topical cream     
 albuterol (PROVENTIL VENTOLIN) 2.5 mg /3 mL (0.083 %) nebulizer solution 3 mL by Nebulization route every four (4) hours as needed for Wheezing or Shortness of Breath. 1 Package 1  
 ALBUTEROL SULFATE (PROAIR HFA IN) Take 2 puffs by inhalation four (4) times daily as needed.  cholecalciferol, vitamin d3, (VITAMIN D) 1,000 unit tablet Take  by mouth daily.  ADVAIR DISKUS 100-50 mcg/Dose diskus inhaler Take 1 puff by inhalation two (2) times a day.  hydrocortisone (WESTCORT) 0.2 % topical cream     
 loratadine (CLARITIN) 10 mg tablet Take 10 mg by mouth daily.  coenzyme q10 30 mg capsule Take 30 mg by mouth three (3) times daily.  MULTIVITAMINS (MULTIPLE VITAMIN PO) Take  by mouth daily.  azelastine (ASTELIN) 137 mcg nasal spray 1 Spray by Nasal route two (2) times a day. Use in each nostril as directed  GLUC HCL/CSANA/GLY-AM-GLY,MX/C (COSAMIN DS PO) Take  by mouth daily. Past History Past Medical History: 
Past Medical History:  
Diagnosis Date  Arthritis   
 parris knee  Asthma   
 dr Yuridia Giles 5/18/17  Basal cell carcinoma of skin 05/10/2017  
 right helix [facility]'s note rec'd  CAD (coronary artery disease)   
 dr Isis Arteaga  CKD (chronic kidney disease), stage III 05/2014 [facility] notes 8/8/17 note and lab  Colon polyps 09/25/13  
 also dad with colon cancer  Contact dermatitis and other eczema, due to unspecified cause   
 eczema dr Sprague Ranks  COPD   
 dr Rios Person chronic bronchitis     5/18/17  Elevated serum creatinine  Fall 11 & 12/2012 Right knee gave away and tripped in his house  GERD (gastroesophageal reflux disease)   
 dr Carlos De La O (hard of hearing)  Hypercholesterolemia  [facility] 07/15/2014 Dr Júnior Manley's   pulmonary fibrosis 5/18/17 f/u note  Proteinuria 10/2012  
 abou assi     12/2/16  RLS (restless legs syndrome)  Schatzki's ring   
 dilation dr Dejuan Strickland  Screening for glaucoma 2016 [facility] note rec'd (annual eye exam)  Shingles 2012  Sleep apnea   
 uses CPAP- 17 f/u note [facility] note/sleep study 17 Past Surgical History: 
Past Surgical History:  
Procedure Laterality Date  ABDOMEN SURGERY PROC UNLISTED    
 left inq hernia  CARDIAC SURG PROCEDURE UNLIST  2017 Pacemaker  ENDOSCOPY, COLON, DIAGNOSTIC    
  dr Brenna Champagne repeat in 5 yrs  EXTRACTION ERUPTED TOOTH/EXR  2017  
 3 teeth extraction.  HX COLONOSCOPY  [id number]  
 brady- ileocecal valve polyp 5 yr repeat [facility]  Summer 2017 Right ear basal cell carcinoma  HX PACEMAKER  2017 Dr. Marvin Cerda  
 
 
Family History: 
Family History Problem Relation Age of Onset  Cancer Father   
   from colon cancer Social History: 
Social History Substance Use Topics  Smoking status: Former Smoker  Smokeless tobacco: Former User Quit date: 1985  Alcohol use No  
 
 
Allergies: 
No Known Allergies Review of Systems Review of Systems Constitutional: Negative for chills and fever. HENT: Negative. Eyes: Negative. Respiratory: Negative for cough, chest tightness and shortness of breath. Cardiovascular: Negative for chest pain and leg swelling. Gastrointestinal: Positive for abdominal pain. Negative for diarrhea, nausea and vomiting. Endocrine: Negative. Genitourinary: Positive for decreased urine volume, difficulty urinating and scrotal swelling. Negative for dysuria. Musculoskeletal: Negative for myalgias. Skin: Negative. Neurological: Negative. Psychiatric/Behavioral: Negative. All other systems reviewed and are negative. Physical Exam  
Physical Exam  
Constitutional: He is oriented to person, place, and time. He appears well-developed and well-nourished. No distress.   
HENT: Head: Normocephalic and atraumatic. Nose: Nose normal.  
Mouth/Throat: No oropharyngeal exudate. Eyes: Conjunctivae and EOM are normal. Pupils are equal, round, and reactive to light. Neck: Normal range of motion. Neck supple. No JVD present. Cardiovascular: Normal rate, regular rhythm, normal heart sounds and intact distal pulses. Exam reveals no friction rub. No murmur heard. Pulmonary/Chest: Effort normal and breath sounds normal. No stridor. No respiratory distress. He has no wheezes. He has no rales. Abdominal: Soft. Bowel sounds are normal. He exhibits no distension. There is no tenderness. There is no rebound. Genitourinary:  
 
 
Left testis shows mass, swelling and tenderness. Musculoskeletal: Normal range of motion. He exhibits no tenderness. Neurological: He is alert and oriented to person, place, and time. No cranial nerve deficit. Skin: Skin is warm and dry. No rash noted. He is not diaphoretic. Psychiatric: He has a normal mood and affect. His speech is normal and behavior is normal. Judgment and thought content normal. Cognition and memory are normal.  
Nursing note and vitals reviewed. Diagnostic Study Results Labs - Recent Results (from the past 12 hour(s)) URINALYSIS W/ REFLEX CULTURE Collection Time: 07/15/18  9:20 PM  
Result Value Ref Range Color YELLOW/STRAW Appearance CLEAR CLEAR Specific gravity 1.025 1.003 - 1.030    
 pH (UA) 5.0 5.0 - 8.0 Protein 100 (A) NEG mg/dL Glucose NEGATIVE  NEG mg/dL Ketone NEGATIVE  NEG mg/dL Bilirubin NEGATIVE  NEG Blood MODERATE (A) NEG Urobilinogen 0.2 0.2 - 1.0 EU/dL Nitrites NEGATIVE  NEG Leukocyte Esterase NEGATIVE  NEG    
 WBC 0-4 0 - 4 /hpf  
 RBC 5-10 0 - 5 /hpf Epithelial cells FEW FEW /lpf Bacteria NEGATIVE  NEG /hpf  
 UA:UC IF INDICATED CULTURE NOT INDICATED BY UA RESULT CNI Mucus TRACE (A) NEG /lpf METABOLIC PANEL, COMPREHENSIVE  Collection Time: 07/15/18  9:49 PM  
Result Value Ref Range Sodium 139 136 - 145 mmol/L Potassium 4.1 3.5 - 5.1 mmol/L Chloride 107 97 - 108 mmol/L  
 CO2 23 21 - 32 mmol/L Anion gap 9 5 - 15 mmol/L Glucose 108 (H) 65 - 100 mg/dL BUN 31 (H) 6 - 20 MG/DL Creatinine 1.79 (H) 0.70 - 1.30 MG/DL  
 BUN/Creatinine ratio 17 12 - 20 GFR est AA 45 (L) >60 ml/min/1.73m2 GFR est non-AA 37 (L) >60 ml/min/1.73m2 Calcium 9.1 8.5 - 10.1 MG/DL Bilirubin, total 0.6 0.2 - 1.0 MG/DL  
 ALT (SGPT) 23 12 - 78 U/L  
 AST (SGOT) 19 15 - 37 U/L Alk. phosphatase 93 45 - 117 U/L Protein, total 7.0 6.4 - 8.2 g/dL Albumin 3.7 3.5 - 5.0 g/dL Globulin 3.3 2.0 - 4.0 g/dL A-G Ratio 1.1 1.1 - 2.2    
CBC WITH AUTOMATED DIFF Collection Time: 07/15/18  9:49 PM  
Result Value Ref Range WBC 10.8 4.1 - 11.1 K/uL  
 RBC 4.65 4.10 - 5.70 M/uL  
 HGB 15.8 12.1 - 17.0 g/dL HCT 46.3 36.6 - 50.3 % MCV 99.6 (H) 80.0 - 99.0 FL  
 MCH 34.0 26.0 - 34.0 PG  
 MCHC 34.1 30.0 - 36.5 g/dL  
 RDW 14.0 11.5 - 14.5 % PLATELET 040 935 - 963 K/uL MPV 9.6 8.9 - 12.9 FL  
 NRBC 0.0 0  WBC ABSOLUTE NRBC 0.00 0.00 - 0.01 K/uL NEUTROPHILS 81 (H) 32 - 75 % LYMPHOCYTES 8 (L) 12 - 49 % MONOCYTES 8 5 - 13 % EOSINOPHILS 2 0 - 7 % BASOPHILS 0 0 - 1 % IMMATURE GRANULOCYTES 1 (H) 0.0 - 0.5 % ABS. NEUTROPHILS 8.7 (H) 1.8 - 8.0 K/UL  
 ABS. LYMPHOCYTES 0.9 0.8 - 3.5 K/UL  
 ABS. MONOCYTES 0.8 0.0 - 1.0 K/UL  
 ABS. EOSINOPHILS 0.2 0.0 - 0.4 K/UL  
 ABS. BASOPHILS 0.0 0.0 - 0.1 K/UL  
 ABS. IMM. GRANS. 0.1 (H) 0.00 - 0.04 K/UL  
 DF AUTOMATED    
TROPONIN I Collection Time: 07/15/18  9:49 PM  
Result Value Ref Range Troponin-I, Qt. <0.05 <0.05 ng/mL LACTIC ACID Collection Time: 07/16/18 12:25 AM  
Result Value Ref Range Lactic acid 1.4 0.4 - 2.0 MMOL/L  
EKG, 12 LEAD, INITIAL Collection Time: 07/16/18  1:56 AM  
Result Value Ref Range  Ventricular Rate 81 BPM  
 Atrial Rate 81 BPM P-R Interval 234 ms QRS Duration 90 ms Q-T Interval 388 ms QTC Calculation (Bezet) 450 ms Calculated P Axis 77 degrees Calculated R Axis -40 degrees Calculated T Axis -61 degrees Diagnosis Sinus rhythm with 1st degree AV block Left axis deviation Minimal voltage criteria for LVH, may be normal variant Anterior infarct , age undetermined ST & T wave abnormality, consider inferolateral ischemia No previous ECGs available Radiologic Studies -  
CT ABD PELV W CONT Final Result CT Results  (Last 48 hours) 07/16/18 0004  CT ABD PELV W CONT Final result Impression:  IMPRESSION: Left inguinal hernia contains a loop of small bowel with partial  
obstruction and also contains a portion of the urinary bladder which extends  
into the scrotum. Additional incidental findings as above. Narrative:  EXAM:  CT ABD PELV W CONT INDICATION: Hernia; L inguinal hernia, incarceration vs strangulation COMPARISON: CT thorax 5/26/2016. CONTRAST:  100 mL of Isovue-370. TECHNIQUE:   
Following the uneventful intravenous administration of contrast, thin axial  
images were obtained through the abdomen and pelvis. Coronal and sagittal  
reconstructions were generated. Oral contrast was not administered. CT dose  
reduction was achieved through use of a standardized protocol tailored for this  
examination and automatic exposure control for dose modulation. FINDINGS:   
LUNG BASES: Dependent atelectasis. Calcified granuloma left lower lobe. Peripheral bulla and fibrotic change. INCIDENTALLY IMAGED HEART AND MEDIASTINUM: Pacemaker leads noted. Mildly  
enlarged heart. LIVER: No mass or biliary dilatation. GALLBLADDER: Unremarkable. SPLEEN: No mass. PANCREAS: No mass or ductal dilatation. ADRENALS: Unremarkable. KIDNEYS: Bilateral renal cysts. No stone, hydronephrosis, or enhancing mass.   
STOMACH: Small moderate sliding hiatal hernia. Otherwise unremarkable. SMALL BOWEL: There is a loop of small bowel contained within left renal hernia  
extending into the scrotum with dilation of the afferent loop to 3.3 cm and  
collapse of the afferent loop. COLON: Noninflamed appearing sigmoid diverticula. APPENDIX: Unremarkable. PERITONEUM: No ascites or pneumoperitoneum. RETROPERITONEUM: Atherosclerotic calcification without aneurysm. No enlarged  
lymphadenopathy. REPRODUCTIVE ORGANS: Seminal vesicles and prostate appear unremarkable. URINARY BLADDER: No mass or calculus. Urinary bladder is also included in the  
left inguinal hernia and extends into the scrotum. BONES: Degenerative spine change. No acute fracture or aggressive lesion. ADDITIONAL COMMENTS: N/A  
   
  
  
 
CXR Results  (Last 48 hours) None Medical Decision Making I am the first provider for this patient. I reviewed the vital signs, available nursing notes, past medical history, past surgical history, family history and social history. Vital Signs-Reviewed the patient's vital signs. Patient Vitals for the past 12 hrs: 
 Temp Pulse Resp BP SpO2  
07/15/18 2102 98.1 °F (36.7 °C) 61 12 186/87 99 % Records Reviewed: Nursing Notes, Old Medical Records, Previous electrocardiograms, Previous Radiology Studies and Previous Laboratory Studies Provider Notes (Medical Decision Making): DDX: 
Hernia, incarcerated/strangulated bowel, urinary retention, uti Plan: 
Labs, ua, ct scan Impression: 
sbo 2/2 hernia and bladder involvement ED Course:  
Initial assessment performed. The patients presenting problems have been discussed, and they are in agreement with the care plan formulated and outlined with them. I have encouraged them to ask questions as they arise throughout their visit.  
 
I reviewed our electronic medical record system for any past medical records that were available that may contribute to the patients current condition, the nursing notes and and vital signs from today's visit Nursing notes will be reviewed as they become available in realtime while the pt has been in the ED. Phyllis Brasher MD 
 
I personally reviewed pt's imaging. Official read by radiology noted above. Phyllis Brasher MD 
 
PROGRESS NOTE: 
10:04 PM 
Pt able to void ~10mL urine at this time. Will continue to monitor. Written by SHILOH Santa, as dictated by Phyllis Brasher MD 
 
CONSULT NOTE:  
1:10 AM 
Phyllis Brasher MD spoke with Dr. Gibran Jackman, Specialty: General Surgery Consulted Dr. Jennifer Lemon due to inguinal hernia with partial bladder involvement noted on CT. Discussed pt's HPI and available diagnostic results thus far. Expressed my concerns for needed admission. Consultant recommends will evaluate pt for surgery. Phyllis Brasher MD 
 
1:17 AM 
Progress note: 
Discussed lab and imaging findings with pt and/or family, specifically noting \"Left inguinal hernia contains a loop of small bowel with partial obstruction and also contains a portion of the urinary bladder which extends into the scrotum. \" All questions have been answered, pt voiced understanding and agreement with plan. Vital signs stable at this time. Phyllis Brasher MD 
 
PROGRESS NOTE: 
1:46 AM 
Dr. Jennifer Lemon in ED to evaluate pt. Written by SHILOH Santa, as dictated by Phyllis Brasher MD 
 
Diagnosis Clinical Impression: 1. Small bowel obstruction ([address] 2. Inguinal hernia of left side with obstruction and without gangrene PLAN: 
1. Admit to General Surgery Disposition: 
 
ADMISSION NOTE: 
1:11 AM 
Patient is being admitted to the hospital by Dr. Jennifer Lemon. The results of their tests and reasons for their admission have been discussed with them and/or available family. They convey agreement and understanding for the need to be admitted and for their admission diagnosis.   
Written by SHILOH Beasley, as dictated by Yolanda Dubois Parth Arguello MD. Attestations: This note is prepared by Julian Upton, acting as Scribe for MD Carlyle Kilgore MD : The scribe's documentation has been prepared under my direction and personally reviewed by me in its entirety. I confirm that the note above accurately reflects all work, treatment, procedures, and medical decision making performed by me. This note will not be viewable in [address]

## 2018-07-16 NOTE — ANESTHESIA POSTPROCEDURE EVALUATION
Post-Anesthesia Evaluation and Assessment    Patient: Noreen Emery MRN: [id number]  SSN: [id number]    YOB: 1940  Age: 66 y.o. Sex: male       Cardiovascular Function/Vital Signs  Visit Vitals    /73    Pulse 60    Temp 36.4 °C (97.6 °F)    Resp 16    Ht 5' 10\" (1.778 m)    Wt 85 kg (187 lb 6.3 oz)    SpO2 99%    BMI 26.89 kg/m2       Patient is status post general anesthesia for Procedure(s):  LEFT INGUINAL HERNIA  REPAIR. Nausea/Vomiting: None    Postoperative hydration reviewed and adequate. Pain:  Pain Scale 1: Numeric (0 - 10) (07/15/18 2102)   Managed    Neurological Status:   Neuro (WDL): Exceptions to WDL (07/16/18 0450)  Neuro  Neurologic State: Eyes open spontaneously; Eyes open to voice (07/16/18 0450)  Orientation Level: Oriented to person;Disoriented to place; Disoriented to situation;Disoriented to time (07/16/18 0450)  Cognition: Follows commands (07/16/18 0450)  Speech: Delayed responses; Appropriate for age (07/16/18 0450)  LUE Motor Response: Purposeful (07/16/18 0450)  LLE Motor Response: Purposeful (07/16/18 0450)  RUE Motor Response: Purposeful (07/16/18 0450)  RLE Motor Response: Purposeful (07/16/18 0450)   At baseline    Mental Status and Level of Consciousness: Arousable    Pulmonary Status:   O2 Device: Nasal cannula (07/16/18 0439)   Adequate oxygenation and airway patent    Complications related to anesthesia: None    Post-anesthesia assessment completed.  No concerns    Signed By: Terry Vegas MD     July 16, 2018

## 2018-07-16 NOTE — PERIOP NOTES
TRANSFER - OUT REPORT:    Verbal report given to Pat Mcdonald RN(name) on Opal Marie  being transferred to U/2105(unit) for routine post - op       Report consisted of patients Situation, Background, Assessment and   Recommendations(SBAR). Information from the following report(s) SBAR, OR Summary, Intake/Output and Cardiac Rhythm paced was reviewed with the receiving nurse. Opportunity for questions and clarification was provided.       Patient transported with:   Registered Nurse

## 2018-07-16 NOTE — H&P
Surgery History and Physcial 
 
Subjective:  
  
Alysha Ca is a 66 y.o. male who presents for evaluation of left inguinal pain and inability to urinate. He has a longstanding left inguinal hernia for several years. He has to manually reduce it to urinate normally. This evening it became larger and irreducible, and painful and he has been unable to void. He denies any nausea or vomiting and had a bowel movement yesterday morning. CT shows an incarcerated obstructed loop of small bowel and incarcerated bladder into the scrotum. Patient Active Problem List  
 Diagnosis Date Noted  Non-recurrent unilateral inguinal hernia with obstruction without gangrene 07/16/2018  SSS (sick sinus syndrome) ([facility] 05/25/2017  Left inguinal hernia 08/26/2016  Hematuria 08/26/2016  Hypertension 05/06/2016  Idiopathic pulmonary fibrosis ([facility] [facility] 01/28/2015  CAD (coronary artery disease)  Hypercholesterolemia  ED (erectile dysfunction) 10/31/2013  Family history of colon cancer 09/25/2013  Colon polyp 09/25/2013  Diverticulosis 09/25/2013  CKD (chronic kidney disease) 01/31/2012  Vitamin D deficiency 07/21/2011  BPH (benign prostatic hypertrophy) with urinary obstruction 07/21/2011  Acne rosacea 07/21/2011  COPD (chronic obstructive pulmonary disease) ([facility] [address] [facility] 07/13/2010  GERD (gastroesophageal reflux disease) 07/13/2010 Past Medical History:  
Diagnosis Date  Arthritis   
 parris knee  Asthma   
 dr Hannah [place] 5/18/17  Basal cell carcinoma of skin 05/10/2017  
 right helix Jose David Baez's note rec'd  CAD (coronary artery disease)   
 dr Juany Tapia  CKD (chronic kidney disease), stage III 05/2014 Amy Miranda notes 8/8/17 note and lab  Colon polyps 09/25/13  
 also dad with colon cancer  Contact dermatitis and other eczema, due to unspecified cause   
 eczema dr Laura Cavazos  COPD   
 dr Adonay Alston chronic bronchitis     5/18/17  Elevated serum creatinine  Fall  & 2012 Right knee gave away and tripped in his house  GERD (gastroesophageal reflux disease)   
 dr Marya Rangel (hard of hearing)  Hypercholesterolemia  ILD (interstitial lung disease) ([facility] [address] 07/15/2014 Dr Júnior Manley's   pulmonary fibrosis 17 f/u note  Proteinuria 10/2012  
 abou assi     16  RLS (restless legs syndrome)  Schatzki's ring   
 dilation dr Sandra Nolasco  Screening for glaucoma 2016 Robert Linn note rec'd (annual eye exam)  Shingles 2012  Sleep apnea   
 uses CPAP- 17 f/u note [facility] note/sleep study 17 Past Surgical History:  
Procedure Laterality Date  ABDOMEN SURGERY PROC UNLISTED    
 left inq hernia  CARDIAC SURG PROCEDURE UNLIST  2017 Pacemaker  ENDOSCOPY, COLON, DIAGNOSTIC    
  dr Joe Gr repeat in 5 yrs  HX COLONOSCOPY  [id number]  
 brady- ileocecal valve polyp 5 yr repeat [facility]  Summer 2017 Right ear basal cell carcinoma  HX PACEMAKER  2017 Dr. Marilin Aviles    
 3 teeth extraction. Social History Substance Use Topics  Smoking status: Former Smoker  Smokeless tobacco: Former User Quit date: 1985  Alcohol use No  
  
Family History Problem Relation Age of Onset  Cancer Father   
   from colon cancer Prior to Admission medications Medication Sig Start Date End Date Taking? Authorizing Provider  
omeprazole (PRILOSEC) 20 mg capsule TAKE 1 CAPSULES BY MOUTH DAILY. TO REDUCE STOMACH ACID 10/2/17   Wyatt Gaucher Read, MD  
aspirin delayed-release 81 mg tablet Take  by mouth daily.     Historical Provider  
pravastatin (PRAVACHOL) 40 mg tablet TAKE 1 TABLET EVERY DAY IN THE NIGHT TO HELP LOWER CHOLESTEROL 5/20/15   Dami Poster, NP  
ketoconazole (NIZORAL) 2 % topical cream  3/31/15   Historical Provider  
albuterol (PROVENTIL VENTOLIN) 2.5 mg /3 mL (0.083 %) nebulizer solution 3 mL by Nebulization route every four (4) hours as needed for Wheezing or Shortness of Breath. 4/10/15   [id number], DO  
ALBUTEROL SULFATE (PROAIR HFA IN) Take 2 puffs by inhalation four (4) times daily as needed. Historical Provider  
cholecalciferol, vitamin d3, (VITAMIN D) 1,000 unit tablet Take  by mouth daily. Historical Provider ADVAIR DISKUS 100-50 mcg/Dose diskus inhaler Take 1 puff by inhalation two (2) times a day. 6/23/10   Historical Provider  
hydrocortisone (WESTCORT) 0.2 % topical cream  4/26/10   Historical Provider  
loratadine (CLARITIN) 10 mg tablet Take 10 mg by mouth daily. Historical Provider  
coenzyme q10 30 mg capsule Take 30 mg by mouth three (3) times daily. Historical Provider MULTIVITAMINS (MULTIPLE VITAMIN PO) Take  by mouth daily. Historical Provider  
azelastine (ASTELIN) 137 mcg nasal spray 1 Spray by Nasal route two (2) times a day. Use in each nostril as directed     Historical Provider GLUC HCL/CSANA/GLY-AM-GLY,MX/C (COSAMIN DS PO) Take  by mouth daily. Historical Provider No Known Allergies Review of Systems Constitutional: Negative for appetite change, chills, diaphoresis and fever. Respiratory: Negative for shortness of breath and wheezing. Cardiovascular: Negative for chest pain and palpitations. Gastrointestinal: Positive for abdominal pain. Negative for diarrhea, nausea and vomiting. Genitourinary: Positive for difficulty urinating and scrotal swelling. Musculoskeletal: Negative for myalgias. Hematological: Does not bruise/bleed easily. Objective:  
 
Visit Vitals  /87  Pulse 78  Temp 98.1 °F (36.7 °C)  Resp 16  
 Ht 5' 10\" (1.778 m)  Wt 187 lb 6.3 oz (85 kg)  SpO2 99%  BMI 26.89 kg/m2 Physical Exam  
Constitutional: He appears well-developed and well-nourished. No distress. HENT:  
Head: Normocephalic and atraumatic.   
Cardiovascular: Normal rate, regular rhythm, normal heart sounds and intact distal pulses. Pulmonary/Chest: Breath sounds normal. He has no wheezes. He has no rales. Abdominal: Soft. Bowel sounds are normal. He exhibits no distension and no mass. There is no tenderness. There is no rebound and no guarding. A hernia is present. Hernia confirmed positive in the left inguinal area. Genitourinary:  
 
 
Left testis shows swelling and tenderness. Musculoskeletal: Normal range of motion. Lymphadenopathy:  
  He has no cervical adenopathy. Imaging:  images and reports reviewed Lab Review:   
Recent Results (from the past 24 hour(s)) URINALYSIS W/ REFLEX CULTURE Collection Time: 07/15/18  9:20 PM  
Result Value Ref Range Color YELLOW/STRAW Appearance CLEAR CLEAR Specific gravity 1.025 1.003 - 1.030    
 pH (UA) 5.0 5.0 - 8.0 Protein 100 (A) NEG mg/dL Glucose NEGATIVE  NEG mg/dL Ketone NEGATIVE  NEG mg/dL Bilirubin NEGATIVE  NEG Blood MODERATE (A) NEG Urobilinogen 0.2 0.2 - 1.0 EU/dL Nitrites NEGATIVE  NEG Leukocyte Esterase NEGATIVE  NEG    
 WBC 0-4 0 - 4 /hpf  
 RBC 5-10 0 - 5 /hpf Epithelial cells FEW FEW /lpf Bacteria NEGATIVE  NEG /hpf  
 UA:UC IF INDICATED CULTURE NOT INDICATED BY UA RESULT CNI Mucus TRACE (A) NEG /lpf METABOLIC PANEL, COMPREHENSIVE Collection Time: 07/15/18  9:49 PM  
Result Value Ref Range Sodium 139 136 - 145 mmol/L Potassium 4.1 3.5 - 5.1 mmol/L Chloride 107 97 - 108 mmol/L  
 CO2 23 21 - 32 mmol/L Anion gap 9 5 - 15 mmol/L Glucose 108 (H) 65 - 100 mg/dL BUN 31 (H) 6 - 20 MG/DL Creatinine 1.79 (H) 0.70 - 1.30 MG/DL  
 BUN/Creatinine ratio 17 12 - 20 GFR est AA 45 (L) >60 ml/min/1.73m2 GFR est non-AA 37 (L) >60 ml/min/1.73m2 Calcium 9.1 8.5 - 10.1 MG/DL Bilirubin, total 0.6 0.2 - 1.0 MG/DL  
 ALT (SGPT) 23 12 - 78 U/L  
 AST (SGOT) 19 15 - 37 U/L Alk. phosphatase 93 45 - 117 U/L  Protein, total 7.0 6.4 - 8.2 g/dL Albumin 3.7 3.5 - 5.0 g/dL Globulin 3.3 2.0 - 4.0 g/dL A-G Ratio 1.1 1.1 - 2.2    
CBC WITH AUTOMATED DIFF Collection Time: 07/15/18  9:49 PM  
Result Value Ref Range WBC 10.8 4.1 - 11.1 K/uL  
 RBC 4.65 4.10 - 5.70 M/uL  
 HGB 15.8 12.1 - 17.0 g/dL HCT 46.3 36.6 - 50.3 % MCV 99.6 (H) 80.0 - 99.0 FL  
 MCH 34.0 26.0 - 34.0 PG  
 MCHC 34.1 30.0 - 36.5 g/dL  
 RDW 14.0 11.5 - 14.5 % PLATELET 171 537 - 974 K/uL MPV 9.6 8.9 - 12.9 FL  
 NRBC 0.0 0  WBC ABSOLUTE NRBC 0.00 0.00 - 0.01 K/uL NEUTROPHILS 81 (H) 32 - 75 % LYMPHOCYTES 8 (L) 12 - 49 % MONOCYTES 8 5 - 13 % EOSINOPHILS 2 0 - 7 % BASOPHILS 0 0 - 1 % IMMATURE GRANULOCYTES 1 (H) 0.0 - 0.5 % ABS. NEUTROPHILS 8.7 (H) 1.8 - 8.0 K/UL  
 ABS. LYMPHOCYTES 0.9 0.8 - 3.5 K/UL  
 ABS. MONOCYTES 0.8 0.0 - 1.0 K/UL  
 ABS. EOSINOPHILS 0.2 0.0 - 0.4 K/UL  
 ABS. BASOPHILS 0.0 0.0 - 0.1 K/UL  
 ABS. IMM. GRANS. 0.1 (H) 0.00 - 0.04 K/UL  
 DF AUTOMATED    
LACTIC ACID Collection Time: 07/16/18 12:25 AM  
Result Value Ref Range Lactic acid 1.4 0.4 - 2.0 MMOL/L Assessment:  
 
Abdominal pain, suspect incarcerated LIH containing obstructed small bowel and bladder. Plan: 1. I recommend proceeding with Surgery:  open left inguinal hernia repair with mesh. 2. Discussed aspects of surgical intervention, methods, risks including by not limited to infection, bleeding, hematoma, and perforation of the intestines or solid organs, recurrence, possible need for bowel resection, and the risks of general anesthetic. The patient understands the risks; any and all questions were answered to the patient's satisfaction. Signed By: Jennifer Felder MD, [address] [place] [address]. [facility] July 16, 2018

## 2018-07-17 PROBLEM — K40.30 INCARCERATED LEFT INGUINAL HERNIA: Status: ACTIVE | Noted: 2018-07-17

## 2018-07-17 PROCEDURE — 97161 PT EVAL LOW COMPLEX 20 MIN: CPT

## 2018-07-17 PROCEDURE — 97162 PT EVAL MOD COMPLEX 30 MIN: CPT

## 2018-07-17 PROCEDURE — 99218 HC RM OBSERVATION: CPT

## 2018-07-17 PROCEDURE — G8978 MOBILITY CURRENT STATUS: HCPCS

## 2018-07-17 PROCEDURE — 74011250636 HC RX REV CODE- 250/636: Performed by: FAMILY MEDICINE

## 2018-07-17 PROCEDURE — 97530 THERAPEUTIC ACTIVITIES: CPT

## 2018-07-17 PROCEDURE — 97116 GAIT TRAINING THERAPY: CPT

## 2018-07-17 PROCEDURE — 74011250636 HC RX REV CODE- 250/636: Performed by: SURGERY

## 2018-07-17 PROCEDURE — 74011250637 HC RX REV CODE- 250/637: Performed by: SURGERY

## 2018-07-17 PROCEDURE — G8988 SELF CARE GOAL STATUS: HCPCS

## 2018-07-17 PROCEDURE — G8979 MOBILITY GOAL STATUS: HCPCS

## 2018-07-17 PROCEDURE — G8987 SELF CARE CURRENT STATUS: HCPCS

## 2018-07-17 PROCEDURE — 77030034850

## 2018-07-17 PROCEDURE — 97165 OT EVAL LOW COMPLEX 30 MIN: CPT

## 2018-07-17 PROCEDURE — 65270000029 HC RM PRIVATE

## 2018-07-17 RX ADMIN — DEXTROSE MONOHYDRATE, SODIUM CHLORIDE, AND POTASSIUM CHLORIDE 75 ML/HR: 50; 4.5; 1.49 INJECTION, SOLUTION INTRAVENOUS at 12:30

## 2018-07-17 RX ADMIN — FLUTICASONE FUROATE AND VILANTEROL TRIFENATATE 1 PUFF: 100; 25 POWDER RESPIRATORY (INHALATION) at 08:45

## 2018-07-17 RX ADMIN — PANTOPRAZOLE SODIUM 40 MG: 40 TABLET, DELAYED RELEASE ORAL at 08:44

## 2018-07-17 RX ADMIN — Medication 10 ML: at 06:00

## 2018-07-17 RX ADMIN — DEXTROSE MONOHYDRATE, SODIUM CHLORIDE, AND POTASSIUM CHLORIDE 125 ML/HR: 50; 4.5; 1.49 INJECTION, SOLUTION INTRAVENOUS at 06:30

## 2018-07-17 RX ADMIN — ALBUTEROL SULFATE 1 PUFF: 90 AEROSOL, METERED RESPIRATORY (INHALATION) at 08:45

## 2018-07-17 RX ADMIN — Medication 10 ML: at 13:16

## 2018-07-17 NOTE — INTERDISCIPLINARY ROUNDS
Interdisciplinary Rounds were completed on this patient. Rounds included nursing, clinical care leader, pharmacy, and case management.      Plan for the day remove [place], advance diet, pain control   Plan for the stay continue current TX  Activity up int he chair, need OT notes for SNF placement   Anticipated discharge date: SNF 7/18

## 2018-07-17 NOTE — PROGRESS NOTES
Problem: Falls - Risk of  Goal: *Absence of Falls  Document Robert Fall Risk and appropriate interventions in the flowsheet. Outcome: Progressing Towards Goal  Fall Risk Interventions:  Mobility Interventions: Patient to call before getting OOB         Medication Interventions: Patient to call before getting OOB    Elimination Interventions: Call light in reach, Patient to call for help with toileting needs    History of Falls Interventions: Bed/chair exit alarm        Problem: Pressure Injury - Risk of  Goal: *Prevention of pressure injury  Document Oj Scale and appropriate interventions in the flowsheet. Outcome: Progressing Towards Goal  Pressure Injury Interventions:  Sensory Interventions: Keep linens dry and wrinkle-free    Moisture Interventions: Absorbent underpads    Activity Interventions: Increase time out of bed, Pressure redistribution bed/mattress(bed type)    Mobility Interventions: Pressure redistribution bed/mattress (bed type), HOB 30 degrees or less    Nutrition Interventions: Document food/fluid/supplement intake    Friction and Shear Interventions: Lift sheet               Comments: Oriented. RA    Full liquid diet tolerating - No advancement today    Linear incision intact. No c/o pain. Maria out, has voided.     BM x 1    Reported mobility walker x 2/ bed alarm

## 2018-07-17 NOTE — PROGRESS NOTES
Problem: Falls - Risk of  Goal: *Absence of Falls  Document Robert Fall Risk and appropriate interventions in the flowsheet. Outcome: Progressing Towards Goal  Fall Risk Interventions:  Mobility Interventions: Patient to call before getting OOB         Medication Interventions: Patient to call before getting OOB, Teach patient to arise slowly         History of Falls Interventions: Bed/chair exit alarm, Door open when patient unattended        Problem: Pressure Injury - Risk of  Goal: *Prevention of pressure injury  Document Oj Scale and appropriate interventions in the flowsheet. Outcome: Progressing Towards Goal  Pressure Injury Interventions:  Sensory Interventions: Assess changes in LOC    Moisture Interventions: Limit adult briefs    Activity Interventions: Increase time out of bed    Mobility Interventions: Turn and reposition approx.  every two hours(pillow and wedges)    Nutrition Interventions: Document food/fluid/supplement intake, Offer support with meals,snacks and hydration    Friction and Shear Interventions: Minimize layers

## 2018-07-17 NOTE — PROGRESS NOTES
Spiritual Care Partner Volunteer visited patient in Gen Surg on July 17, 2018.   Documented by:  CHRISTINE Villatoro, [facility], [place] [facility] Paging Service  [phone])

## 2018-07-17 NOTE — PROGRESS NOTES
General Surgery End of Shift Nursing Note    Bedside shift change report given to Дмитрий Covarrubias 15 (oncoming nurse) by Reta Schneider. Dylon Snell RN (offgoing nurse). Report included the following information SBAR, Kardex, OR Summary, Procedure Summary, Intake/Output, MAR and Recent Results. Shift worked:   7a to 3p   Summary of shift:    Up to chair and tolerating diet. Maria removed at 1230 pm today   Issues for physician to address:   none     Number times ambulated in hallway past shift: 1    Number of times OOB to chair past shift: 2    Pain Management:  Current medication: none  Patient states pain is manageable on current pain medication: YES    GI:    Current diet:  DIET FULL LIQUID    Tolerating current diet: YES  Passing flatus: YES  Last Bowel Movement: 7/15/18   Appearance: soft    Respiratory:    Incentive Spirometer at bedside: YES  Patient instructed on use: YES    Patient Safety:    Falls Score: 3  Bed Alarm On? Yes  Sitter?  No    Tae Muse RN

## 2018-07-17 NOTE — PROGRESS NOTES
Problem: Mobility Impaired (Adult and Pediatric)  Goal: *Acute Goals and Plan of Care (Insert Text)  Physical Therapy Goals  Initiated 7/17/2018  1. Patient will move from supine to sit and sit to supine  in bed with modified independence within 7 day(s). 2.  Patient will transfer from bed to chair and chair to bed with supervision/set-up using the least restrictive device within 7 day(s). 3.  Patient will perform sit to stand with supervision/set-up within 7 day(s). 4.  Patient will ambulate with supervision/set-up for 200 feet with the least restrictive device within 7 day(s). 5.  Patient will ascend/descend 5 stairs with 1 handrail(s) with minimal assistance/contact guard assist within 7 day(s). physical Therapy EVALUATION  Patient: Alina Worthington (89 y.o. male)  Date: 7/17/2018  Primary Diagnosis: incarcerated hernia with bowel obstruction  Unilateral inguinal hernia with obstruction and without gangrene  Procedure(s) (LRB):  LEFT INGUINAL HERNIA  REPAIR (Left) 1 Day Post-Op   Precautions:   Fall    ASSESSMENT :  Based on the objective data described below, the patient presents with generalized weakness, decreased activity tolerance, impaired balance and altered gait. Pt lives at [place] [id number] with son, but son works during the day and has a hx of falls. Pt was received in supine and cleared by nursing to mobilize. Bed mobility was performed with CGA to come to the EOB with use of bed rail and HOB elevated. Good sitting balance noted. Sit<>stand was performed with min A and additional time, utilized rocking technique with RW in front. Ambulated within the room and arteaga for a total of 175ft with RW and CGA, needing multiple cues to manage the walker safely. He was returned to the room and practiced sit<>stand transfer from low armchair with min/CGA, significant additional time needed. Noted pt has R RCT. Recommending SNF at discharge due to history of falls and limited support during the day at home. Patient will benefit from skilled intervention to address the above impairments. Patients rehabilitation potential is considered to be Good  Factors which may influence rehabilitation potential include:   []         None noted  []         Mental ability/status  []         Medical condition  [x]         Home/family situation and support systems  []         Safety awareness  []         Pain tolerance/management  []         Other:      PLAN :  Recommendations and Planned Interventions:  [x]           Bed Mobility Training             []    Neuromuscular Re-Education  [x]           Transfer Training                   []    Orthotic/Prosthetic Training  [x]           Gait Training                         []    Modalities  [x]           Therapeutic Exercises           []    Edema Management/Control  [x]           Therapeutic Activities            [x]    Patient and Family Training/Education  []           Other (comment):    Frequency/Duration: Patient will be followed by physical therapy  4 times a week to address goals. Discharge Recommendations: Skilled Nursing Facility  Further Equipment Recommendations for Discharge: TBD, family wants rollator     SUBJECTIVE:   Patient stated I have that thing on my commode, make it easier.     OBJECTIVE DATA SUMMARY:   HISTORY:    Past Medical History:   Diagnosis Date    Arthritis     parris knee    Asthma     dr Serrano Staff 5/18/17    Basal cell carcinoma of skin 05/10/2017    right helix Jose David Baez's note rec'd    CAD (coronary artery disease)     dr Latina Harada     CKD (chronic kidney disease), stage III 05/2014    Krys Vasques notes 8/8/17 note and lab    Colon polyps 09/25/13    also dad with colon cancer    Contact dermatitis and other eczema, due to unspecified cause     eczema dr Daniel Kang    COPD     dr Andree Grant chronic bronchitis     5/18/17    Elevated serum creatinine     Fall 11 & 12/2012    Right knee gave away and tripped in his house    GERD (gastroesophageal reflux disease)     dr Eletha Duane (hard of hearing)     Hypercholesterolemia     ILD (interstitial lung disease) ([facility] [address] 07/15/2014    Dr Júnior Manley's   pulmonary fibrosis 5/18/17 f/u note    Proteinuria 10/2012    abou assi     12/2/16    RLS (restless legs syndrome)     Schatzki's ring 5/03    dilation dr Leonel Bender for glaucoma 09/21/2016    Robert Linn note rec'd (annual eye exam)    Shingles 4/2012    Sleep apnea     uses CPAP- 7/6/17 f/u note [facility] note/sleep study 9/8/17     Past Surgical History:   Procedure Laterality Date    ABDOMEN SURGERY PROC UNLISTED      left inq hernia    CARDIAC SURG PROCEDURE UNLIST  05/30/2017    Pacemaker    ENDOSCOPY, COLON, DIAGNOSTIC      8/08 dr Joe Gr repeat in 5 yrs    HX COLONOSCOPY  [id number]    brady- ileocecal valve polyp 5 yr repeat    HX HEENT  Summer 2017    Right ear basal cell carcinoma    HX PACEMAKER  05/30/2017    Dr. Neal Kaur  2017    3 teeth extraction. Prior Level of Function/Home Situation: lives at home with son who works during the day. He uses a cane at home but has been having multiple falls. Daughter is concerned with pt safety and wants rehab and additional DME  Personal factors and/or comorbidities impacting plan of care: [facility]    Home Situation  Home Environment: Private residence  # Steps to Enter: 1  One/Two Story Residence: Split level (tri level)  # of Interior Steps: 4  Living Alone: No  Support Systems: Child(lesly) (son lies there but works during the day)  Patient Expects to be Discharged to[de-identified] Private residence  Current DME Used/Available at Home: Cane, quad, Raised toilet seat, Grab bars, Nebulizer  Tub or Shower Type: Shower    EXAMINATION/PRESENTATION/DECISION MAKING:   Critical Behavior:  Neurologic State: Alert  Orientation Level: Oriented X4  Cognition: Follows commands     Hearing:   Auditory  Auditory Impairment: Hard of hearing, bilateral, Hearing aid(s)  Hearing Aids/Status: Left, Right  Skin:  intact  Edema: none  Range Of Motion:  AROM: Generally decreased, functional (limited shoulder flexion/abd)           PROM: Generally decreased, functional           Strength:    Strength: Generally decreased, functional                    Tone & Sensation:   Tone: Normal              Sensation: Intact               Coordination:  Coordination: Within functional limits  Vision:      Functional Mobility:  Bed Mobility:  Rolling: Contact guard assistance  Supine to Sit: Contact guard assistance (HOB elevated and use of bed rail)        Transfers:  Sit to Stand: Minimum assistance;Assist x2; Additional time (rocking technique and significant forward weigth shift)  Stand to Sit: Minimum assistance                       Balance:   Sitting: Intact  Standing: Impaired  Standing - Static: Good;Constant support  Standing - Dynamic : Fair  Ambulation/Gait Training:  Distance (ft): 175 Feet (ft)  Assistive Device: Gait belt;Walker, rolling  Ambulation - Level of Assistance: Contact guard assistance        Gait Abnormalities: Decreased step clearance;Shuffling gait (fallen arch on the R (arthritic knee on the R))        Base of Support: Narrowed     Speed/Lydia: Pace decreased (<100 feet/min); Shuffled  Step Length: Left shortened;Right shortened                  Functional Measure:  Barthel Index:    Bathin  Bladder: 0  Bowels: 10  Groomin  Dressin  Feedin  Mobility: 10  Stairs: 0  Toilet Use: 5  Transfer (Bed to Chair and Back): 10  Total: 50       Barthel and G-code impairment scale:  Percentage of impairment CH  0% CI  1-19% CJ  20-39% CK  40-59% CL  60-79% CM  80-99% CN  100%   Barthel Score 0-100 100 99-80 79-60 59-40 20-39 1-19   0   Barthel Score 0-20 20 17-19 13-16 9-12 5-8 1-4 0      The Barthel ADL Index: Guidelines  1.  The index should be used as a record of what a patient does, not as a record of what a patient could do.  2. The main aim is to establish degree of independence from any help, physical or verbal, however minor and for whatever reason. 3. The need for supervision renders the patient not independent. 4. A patient's performance should be established using the best available evidence. Asking the patient, friends/relatives and nurses are the usual sources, but direct observation and common sense are also important. However direct testing is not needed. 5. Usually the patient's performance over the preceding 24-48 hours is important, but occasionally longer periods will be relevant. 6. Middle categories imply that the patient supplies over 50 per cent of the effort. 7. Use of aids to be independent is allowed. Sakina Lombardi., Barthel, DVirgilioW. ([id number]). Functional evaluation: the Barthel Index. [facility] (14)2. Rex Musa karen MAIDA Finley, Alta Doyle., Sallie Johnson., Erasmo, [address] [place] [address] (1999). Measuring the change indisability after inpatient rehabilitation; comparison of the responsiveness of the Barthel Index and Functional [place] Measure. Journal of Neurology, Neurosurgery, and Psychiatry, 66(4), 381-688. Walt Barajas, N.J.A, BINDU Holman.PAULO, & Ken De Leon, M.A. (2004.) Assessment of post-stroke quality of life in cost-effectiveness studies: The usefulness of the Barthel Index and the EuroQoL-5D. Quality of Life Research, 13, 348-00         G codes: In compliance with CMSs Claims Based Outcome Reporting, the following G-code set was chosen for this patient based on their primary functional limitation being treated: The outcome measure chosen to determine the severity of the functional limitation was the barthel with a score of 50/100 which was correlated with the impairment scale.     ? Mobility - Walking and Moving Around:     - CURRENT STATUS: CK - 40%-59% impaired, limited or restricted    - GOAL STATUS: CJ - 20%-39% impaired, limited or restricted    - D/C STATUS:  ---------------To be determined---------------      Physical Therapy Evaluation Charge Determination   History Examination Presentation Decision-Making   MEDIUM  Complexity : 1-2 comorbidities / personal factors will impact the outcome/ POC  MEDIUM Complexity : 3 Standardized tests and measures addressing body structure, function, activity limitation and / or participation in recreation  LOW Complexity : Stable, uncomplicated  Other outcome measures barthel  MEDIUM      Based on the above components, the patient evaluation is determined to be of the following complexity level: LOW     Pain:  Pain Scale 1: Numeric (0 - 10)  Pain Intensity 1: 0              Activity Tolerance:   WFL  Please refer to the flowsheet for vital signs taken during this treatment. After treatment:   [x]         Patient left in no apparent distress sitting up in chair  []         Patient left in no apparent distress in bed  [x]         Call bell left within reach  [x]         Nursing notified  []         Caregiver present  [x]         Bed alarm activated    COMMUNICATION/EDUCATION:   The patients plan of care was discussed with: Occupational Therapist and Registered Nurse. [x]         Fall prevention education was provided and the patient/caregiver indicated understanding. []         Patient/family have participated as able in goal setting and plan of care. [x]         Patient/family agree to work toward stated goals and plan of care. []         Patient understands intent and goals of therapy, but is neutral about his/her participation. []         Patient is unable to participate in goal setting and plan of care.     Thank you for this referral.  Jayde Clifton, PT, DPT   Time Calculation: 34 mins

## 2018-07-17 NOTE — PROGRESS NOTES
CM met with pt and daughter's during IDR's. PT recommendation of SNF shared. Awaiting OT recommendation. Bedside nurse phoned OT to request eval today. Family has chosen [address] [place] [address] for SNF placement. CM sent referrals thru [facility] and is awaiting responses.      Tilda Fleischer, CARLYNN, RN  Care Manager [facility]  [phone]

## 2018-07-17 NOTE — PROGRESS NOTES
Problem: Self Care Deficits Care Plan (Adult)  Goal: *Acute Goals and Plan of Care (Insert Text)  Occupational Therapy Goals  Initiated 7/17/2018  1. Patient will perform standing ADLs at sink with RW with supervision/set-up within 7 day(s). 2.  Patient will perform lower body dressing with supervision/set-up within 7 day(s). 3.  Patient will perform bathing with supervision/set-up within 7 day(s). 4.  Patient will perform toilet transfers with supervision/set-up within 7 day(s). 5.  Patient will perform all aspects of toileting with supervision/set-up within 7 day(s). 6.  Patient will use RW correctly during functional mobility and ADLs with min verbal cues within 7 days      Occupational Therapy EVALUATION  Patient: Jennifer Alvarado (52 y.o. male)  Date: 7/17/2018  Primary Diagnosis: incarcerated hernia with bowel obstruction  Unilateral inguinal hernia with obstruction and without gangrene  Procedure(s) (LRB):  LEFT INGUINAL HERNIA  REPAIR (Left) 1 Day Post-Op   Precautions:   Fall    ASSESSMENT :  Based on the objective data described below, the patient presents with intermittent confusion, impaired balance due to R knee arthritis, decreased strength, risk and hx of falls and need for CGA to min A for ADLs at this time s/p L inguinal hernia repair POD 1. Pt also requiring use of RW which pt needed max verbal and tactile cues for for safety during ADLs, turns and transitions. Pt with increased difficulty standing from low surfaces, needing CGA to min A x 1 and additional time for toilet transfer and chair. Family concerned regarding pt's safety at home as he is mainly home alone with a decline in hygiene per family. Recommend short term SNF rehab to increase strength, safety with ADLs as well as education for proper DME use. Patient will benefit from skilled intervention to address the above impairments.   Patients rehabilitation potential is considered to be Good  Factors which may influence rehabilitation potential include:   []             None noted  [x]             Mental ability/status  [x]             Medical condition  []             Home/family situation and support systems  []             Safety awareness  []             Pain tolerance/management  []             Other:      PLAN :  Recommendations and Planned Interventions:  [x]               Self Care Training                  [x]        Therapeutic Activities  [x]               Functional Mobility Training    []        Cognitive Retraining  [x]               Therapeutic Exercises           [x]        Endurance Activities  [x]               Balance Training                   []        Neuromuscular Re-Education  []               Visual/Perceptual Training     [x]   Home Safety Training  [x]               Patient Education                 [x]        Family Training/Education  []               Other (comment):    Frequency/Duration: Patient will be followed by occupational therapy 4 times a week to address goals. Discharge Recommendations: Florencio Hyatt  Further Equipment Recommendations for Discharge: TBD     SUBJECTIVE:   Patient stated I have a bad knee.     OBJECTIVE DATA SUMMARY:   HISTORY:   Past Medical History:   Diagnosis Date    Arthritis     parris knee    Asthma     dr Sumi Smith 5/18/17    Basal cell carcinoma of skin 05/10/2017    right helix Jose David Baez's note rec'd    CAD (coronary artery disease)     dr Maxine Marcum     CKD (chronic kidney disease), stage III 05/2014    Nomiu Layo notes 8/8/17 note and lab    Colon polyps 09/25/13    also dad with colon cancer    Contact dermatitis and other eczema, due to unspecified cause     eczema dr Elvira Canales    COPD     dr Osman Morales chronic bronchitis     5/18/17    Elevated serum creatinine     Fall 11 & 12/2012    Right knee gave away and tripped in his house    GERD (gastroesophageal reflux disease)     dr Judit Carnes (hard of hearing)     Hypercholesterolemia     ILD (interstitial lung disease) ([facility] [address] 07/15/2014    Dr Júnior Manley's   pulmonary fibrosis 5/18/17 f/u note    Proteinuria 10/2012    abou assi     12/2/16    RLS (restless legs syndrome)     Schatzki's ring 5/03    dilation dr Le Mondragon for glaucoma 09/21/2016    Swati Officer note rec'd (annual eye exam)    Shingles 4/2012    Sleep apnea     uses CPAP- 7/6/17 f/u note [facility] note/sleep study 9/8/17     Past Surgical History:   Procedure Laterality Date    ABDOMEN SURGERY PROC UNLISTED      left inq hernia    CARDIAC SURG PROCEDURE UNLIST  05/30/2017    Pacemaker    ENDOSCOPY, COLON, DIAGNOSTIC      8/08 dr Jenelle Bowman repeat in 5 yrs    HX COLONOSCOPY  [id number]    brady- ileocecal valve polyp 5 yr repeat    HX HEENT  Summer 2017    Right ear basal cell carcinoma    HX PACEMAKER  05/30/2017    Dr. Juli Bajwa  2017    3 teeth extraction. Prior Level of Function/Environment/Context: lives with son who is a resident at [facility] so mainly home alone. Sometimes uses quad cane but usually does not. Mod I for ADLs but daughter reports decline in hygiene and suspects pt is not bathing at home. Hx of falls.    Occupations in which the patient is/was successful, what are the barriers preventing that success:   Performance Patterns (routines, roles, habits, and rituals):   Personal Interests and/or values:   Expanded or extensive additional review of patient history: R knee DJD    Home Situation  Home Environment: Private residence  # Steps to Enter: 1  One/Two Story Residence: Split level (tri level)  # of Interior Steps: 4  Living Alone: No  Support Systems: Child(lesly) (son lies there but works during the day)  Patient Expects to be Discharged to[de-identified] Private residence  Current DME Used/Available at Home: Cane, quad, Raised toilet seat, Grab bars, Nebulizer  Tub or Shower Type: Shower    Hand dominance: Right    Teofilo 593 DEFICITS:  Cognitive/Behavioral Status:                      Skin: intact    Edema: none noted    Hearing: Auditory  Auditory Impairment: Hard of hearing, bilateral, Hearing aid(s)  Hearing Aids/Status: Left, Right    Vision/Perceptual:    Tracking: Able to track stimulus in all quadrants w/o difficulty                      Acuity: Within Defined Limits    Corrective Lenses: Glasses    Range of Motion:    AROM: Generally decreased, functional (limited shoulder flexion/abd)- decreased R shoulder ROM from hx of fall, dtr reports rotator cuff injury  PROM: Generally decreased, functional                      Strength:    Strength: Generally decreased, functional                Coordination:  Coordination: Within functional limits  Fine Motor Skills-Upper: Left Intact; Right Intact    Gross Motor Skills-Upper: Left Intact; Right Intact    Tone & Sensation:    Tone: Normal  Sensation: Intact                      Balance:  Sitting: Intact  Standing: Impaired  Standing - Static: Good;Constant support  Standing - Dynamic : Fair    Functional Mobility and Transfers for ADLs:  Bed Mobility:  Rolling: Contact guard assistance  Supine to Sit: Contact guard assistance (HOB elevated and use of bed rail)    Transfers:  Sit to Stand: Minimum assistance;Assist x2; Additional time (rocking technique and significant forward weigth shift)  Stand to Sit: Minimum assistance  Toilet Transfer : Minimum assistance;Assist x1;Additional time (L grab bar)    ADL Assessment:  Feeding: Independent    Oral Facial Hygiene/Grooming: Contact guard assistance    Bathing: Minimum assistance    Upper Body Dressing: Supervision    Lower Body Dressing: Contact guard assistance;Minimum assistance    Toileting: Minimum assistance                ADL Intervention and task modifications:          Pt educated on role of OT and required verbal cues for safety and proper hand placement during ADLs/functional transfers.                                    Functional Measure:  Barthel Index:    Bathin  Bladder: 0  Bowels: 10  Groomin  Dressin  Feedin  Mobility: 10  Stairs: 0  Toilet Use: 5  Transfer (Bed to Chair and Back): 10  Total: 50       Barthel and G-code impairment scale:  Percentage of impairment CH  0% CI  1-19% CJ  20-39% CK  40-59% CL  60-79% CM  80-99% CN  100%   Barthel Score 0-100 100 99-80 79-60 59-40 20-39 1-19   0   Barthel Score 0-20 20 17-19 13-16 9-12 5-8 1-4 0      The Barthel ADL Index: Guidelines  1. The index should be used as a record of what a patient does, not as a record of what a patient could do. 2. The main aim is to establish degree of independence from any help, physical or verbal, however minor and for whatever reason. 3. The need for supervision renders the patient not independent. 4. A patient's performance should be established using the best available evidence. Asking the patient, friends/relatives and nurses are the usual sources, but direct observation and common sense are also important. However direct testing is not needed. 5. Usually the patient's performance over the preceding 24-48 hours is important, but occasionally longer periods will be relevant. 6. Middle categories imply that the patient supplies over 50 per cent of the effort. 7. Use of aids to be independent is allowed. Denis Navarro., Barthel, D.W. (3665). Functional evaluation: the Barthel Index. [facility] (14)2. MAIDA Blanco, Lorena Chaparro, Dung Dc., [facility], [address] [place] [address] (). Measuring the change indisability after inpatient rehabilitation; comparison of the responsiveness of the Barthel Index and Functional Mason Measure. Journal of Neurology, Neurosurgery, and Psychiatry, 66(4), 085-162. Laz Moctezuma, N.J.A, JEANIE Holman, & Angel Self M.A. (2004.) Assessment of post-stroke quality of life in cost-effectiveness studies: The usefulness of the Barthel Index and the EuroQoL-5D.  [facility] 13, 386-00 G codes: In compliance with CMSs Claims Based Outcome Reporting, the following G-code set was chosen for this patient based on their primary functional limitation being treated: The outcome measure chosen to determine the severity of the functional limitation was the barthel index with a score of 50/100 which was correlated with the impairment scale. ? Self Care:     - CURRENT STATUS: CK - 40%-59% impaired, limited or restricted    - GOAL STATUS: CJ - 20%-39% impaired, limited or restricted    - D/C STATUS:  ---------------To be determined---------------     Occupational Therapy Evaluation Charge Determination   History Examination Decision-Making   LOW Complexity : Brief history review  LOW Complexity : 1-3 performance deficits relating to physical, cognitive , or psychosocial skils that result in activity limitations and / or participation restrictions  MEDIUM Complexity : Patient may present with comorbidities that affect occupational performnce. Miniml to moderate modification of tasks or assistance (eg, physical or verbal ) with assesment(s) is necessary to enable patient to complete evaluation       Based on the above components, the patient evaluation is determined to be of the following complexity level: LOW   Pain:  Pain Scale 1: Numeric (0 - 10)  Pain Intensity 1: 0              Activity Tolerance:   VSS  Please refer to the flowsheet for vital signs taken during this treatment. After treatment:   [x] Patient left in no apparent distress sitting up in chair  [] Patient left in no apparent distress in bed  [x] Call bell left within reach  [x] Nursing notified  [x] Caregiver present  [x] Bed alarm activated    COMMUNICATION/EDUCATION:   The patients plan of care was discussed with: Physical Therapist and Registered Nurse. [x] Home safety education was provided and the patient/caregiver indicated understanding.   [x] Patient/family have participated as able in goal setting and plan of care. [x] Patient/family agree to work toward stated goals and plan of care. [] Patient understands intent and goals of therapy, but is neutral about his/her participation. [] Patient is unable to participate in goal setting and plan of care. This patients plan of care is appropriate for delegation to [facility].     Thank you for this referral.  Elayne Vaca OT  Time Calculation: 28 mins

## 2018-07-18 LAB
ANION GAP SERPL CALC-SCNC: 6 MMOL/L (ref 5–15)
BUN SERPL-MCNC: 17 MG/DL (ref 6–20)
BUN/CREAT SERPL: 13 (ref 12–20)
CALCIUM SERPL-MCNC: 8.6 MG/DL (ref 8.5–10.1)
CHLORIDE SERPL-SCNC: 106 MMOL/L (ref 97–108)
CO2 SERPL-SCNC: 26 MMOL/L (ref 21–32)
CREAT SERPL-MCNC: 1.31 MG/DL (ref 0.7–1.3)
ERYTHROCYTE [DISTWIDTH] IN BLOOD BY AUTOMATED COUNT: 14.4 % (ref 11.5–14.5)
GLUCOSE SERPL-MCNC: 96 MG/DL (ref 65–100)
HCT VFR BLD AUTO: 45.4 % (ref 36.6–50.3)
HGB BLD-MCNC: 15.3 G/DL (ref 12.1–17)
MCH RBC QN AUTO: 33.8 PG (ref 26–34)
MCHC RBC AUTO-ENTMCNC: 33.7 G/DL (ref 30–36.5)
MCV RBC AUTO: 100.4 FL (ref 80–99)
NRBC # BLD: 0 K/UL (ref 0–0.01)
NRBC BLD-RTO: 0 PER 100 WBC
PLATELET # BLD AUTO: 203 K/UL (ref 150–400)
PMV BLD AUTO: 10.1 FL (ref 8.9–12.9)
POTASSIUM SERPL-SCNC: 4.5 MMOL/L (ref 3.5–5.1)
RBC # BLD AUTO: 4.52 M/UL (ref 4.1–5.7)
SODIUM SERPL-SCNC: 138 MMOL/L (ref 136–145)
WBC # BLD AUTO: 10.3 K/UL (ref 4.1–11.1)

## 2018-07-18 PROCEDURE — 74011250636 HC RX REV CODE- 250/636: Performed by: FAMILY MEDICINE

## 2018-07-18 PROCEDURE — 85027 COMPLETE CBC AUTOMATED: CPT | Performed by: FAMILY MEDICINE

## 2018-07-18 PROCEDURE — 65270000029 HC RM PRIVATE

## 2018-07-18 PROCEDURE — 36415 COLL VENOUS BLD VENIPUNCTURE: CPT | Performed by: FAMILY MEDICINE

## 2018-07-18 PROCEDURE — 80048 BASIC METABOLIC PNL TOTAL CA: CPT | Performed by: FAMILY MEDICINE

## 2018-07-18 PROCEDURE — 74011250637 HC RX REV CODE- 250/637: Performed by: SURGERY

## 2018-07-18 PROCEDURE — 74011250637 HC RX REV CODE- 250/637: Performed by: FAMILY MEDICINE

## 2018-07-18 RX ORDER — LANOLIN ALCOHOL/MO/W.PET/CERES
3 CREAM (GRAM) TOPICAL
Status: DISCONTINUED | OUTPATIENT
Start: 2018-07-18 | End: 2018-07-20 | Stop reason: HOSPADM

## 2018-07-18 RX ORDER — AMOXICILLIN 250 MG
1 CAPSULE ORAL DAILY
Status: DISCONTINUED | OUTPATIENT
Start: 2018-07-19 | End: 2018-07-20 | Stop reason: HOSPADM

## 2018-07-18 RX ORDER — IBUPROFEN 200 MG
200 TABLET ORAL 3 TIMES DAILY
Status: DISCONTINUED | OUTPATIENT
Start: 2018-07-18 | End: 2018-07-20 | Stop reason: HOSPADM

## 2018-07-18 RX ORDER — DIPHENHYDRAMINE HCL 25 MG
25 CAPSULE ORAL
Status: DISCONTINUED | OUTPATIENT
Start: 2018-07-18 | End: 2018-07-18 | Stop reason: ALTCHOICE

## 2018-07-18 RX ADMIN — PANTOPRAZOLE SODIUM 40 MG: 40 TABLET, DELAYED RELEASE ORAL at 08:11

## 2018-07-18 RX ADMIN — Medication 10 ML: at 21:42

## 2018-07-18 RX ADMIN — MELATONIN TAB 3 MG 3 MG: 3 TAB at 21:41

## 2018-07-18 RX ADMIN — DEXTROSE MONOHYDRATE, SODIUM CHLORIDE, AND POTASSIUM CHLORIDE 50 ML/HR: 50; 4.5; 1.49 INJECTION, SOLUTION INTRAVENOUS at 15:22

## 2018-07-18 RX ADMIN — IBUPROFEN 200 MG: 200 TABLET, FILM COATED ORAL at 23:01

## 2018-07-18 RX ADMIN — IBUPROFEN 200 MG: 200 TABLET, FILM COATED ORAL at 15:36

## 2018-07-18 RX ADMIN — FLUTICASONE FUROATE AND VILANTEROL TRIFENATATE 1 PUFF: 100; 25 POWDER RESPIRATORY (INHALATION) at 08:11

## 2018-07-18 RX ADMIN — ALBUTEROL SULFATE 1 PUFF: 90 AEROSOL, METERED RESPIRATORY (INHALATION) at 08:11

## 2018-07-18 NOTE — PROGRESS NOTES
CM reviewed medical record. Pt upgraded yesterday to inpt status. CM met with pt and family and reviewed criteria for SNF placement of 3 night stay and pt not eligible until Friday am. Pt was accepted by [facility] and [address] [place] [address],[address]. Family advised to choose facility and to visit today if unsure and let CM know as soon as possible for arrangements to be made for Friday if medically stable to transfer. Family states they will transport pt to facility at discharge. CM will continue to follow for discharge needs.     Barbara Faustin, CARLYNN, RN  Care Manager [facility]  [phone]

## 2018-07-18 NOTE — INTERDISCIPLINARY ROUNDS
Interdisciplinary Rounds were completed on this patient. Rounds included nursing, clinical care leader, pharmacy, and case management. Plan for the day PT, awaiting BM, full liquid diet, advance diet? ?  Plan for the stay: Continue current [address]  Activity up in chair, working with PT  Anticipated discharge date: [facility] Friday

## 2018-07-18 NOTE — PROGRESS NOTES
Bedside verbal report given to [facility] (oncoming nurse) with SBAR, Kardex, MAR, and recent results reviewed. Oj: 18, Mick: 4. Patient awake most of night, slept at short intervals only.

## 2018-07-19 PROCEDURE — 74011250637 HC RX REV CODE- 250/637: Performed by: SURGERY

## 2018-07-19 PROCEDURE — 74011250636 HC RX REV CODE- 250/636: Performed by: FAMILY MEDICINE

## 2018-07-19 PROCEDURE — 97116 GAIT TRAINING THERAPY: CPT

## 2018-07-19 PROCEDURE — 65270000029 HC RM PRIVATE

## 2018-07-19 PROCEDURE — 74011250637 HC RX REV CODE- 250/637: Performed by: FAMILY MEDICINE

## 2018-07-19 RX ADMIN — Medication 10 ML: at 21:56

## 2018-07-19 RX ADMIN — MELATONIN TAB 3 MG 3 MG: 3 TAB at 21:56

## 2018-07-19 RX ADMIN — STANDARDIZED SENNA CONCENTRATE AND DOCUSATE SODIUM 1 TABLET: 8.6; 5 TABLET, FILM COATED ORAL at 08:32

## 2018-07-19 RX ADMIN — IBUPROFEN 200 MG: 200 TABLET, FILM COATED ORAL at 08:38

## 2018-07-19 RX ADMIN — Medication 10 ML: at 13:10

## 2018-07-19 RX ADMIN — PANTOPRAZOLE SODIUM 40 MG: 40 TABLET, DELAYED RELEASE ORAL at 08:32

## 2018-07-19 RX ADMIN — FLUTICASONE FUROATE AND VILANTEROL TRIFENATATE 1 PUFF: 100; 25 POWDER RESPIRATORY (INHALATION) at 08:32

## 2018-07-19 RX ADMIN — IBUPROFEN 200 MG: 200 TABLET, FILM COATED ORAL at 21:57

## 2018-07-19 RX ADMIN — IBUPROFEN 200 MG: 200 TABLET, FILM COATED ORAL at 17:02

## 2018-07-19 RX ADMIN — DEXTROSE MONOHYDRATE, SODIUM CHLORIDE, AND POTASSIUM CHLORIDE 50 ML/HR: 50; 4.5; 1.49 INJECTION, SOLUTION INTRAVENOUS at 11:07

## 2018-07-19 RX ADMIN — ALBUTEROL SULFATE 1 PUFF: 90 AEROSOL, METERED RESPIRATORY (INHALATION) at 08:32

## 2018-07-19 NOTE — PROGRESS NOTES
Problem: Mobility Impaired (Adult and Pediatric)  Goal: *Acute Goals and Plan of Care (Insert Text)  Physical Therapy Goals  Initiated 7/17/2018  1. Patient will move from supine to sit and sit to supine  in bed with modified independence within 7 day(s). 2.  Patient will transfer from bed to chair and chair to bed with supervision/set-up using the least restrictive device within 7 day(s). 3.  Patient will perform sit to stand with supervision/set-up within 7 day(s). 4.  Patient will ambulate with supervision/set-up for 200 feet with the least restrictive device within 7 day(s). 5.  Patient will ascend/descend 5 stairs with 1 handrail(s) with minimal assistance/contact guard assist within 7 day(s). physical Therapy TREATMENT  Patient: Victorina Bowen (43 y.o. male)  Date: 7/19/2018  Diagnosis: incarcerated hernia with bowel obstruction  Unilateral inguinal hernia with obstruction and without gangrene  Incarcerated left inguinal hernia Recurrent unilateral inguinal hernia with obstruction and without gangrene  Procedure(s) (LRB):  LEFT INGUINAL HERNIA  REPAIR (Left) 3 Days Post-Op  Precautions: Fall  Chart, physical therapy assessment, plan of care and goals were reviewed. ASSESSMENT:  Discussed with nursing and cleared to mobilize pt. Pt received supine in bed, agreeable to therapy. He transferred supine to sit and sit <> stand with SBA. Pt ambulated 400 feet with RW, CGA. No LOB noted, and pt denied any SOB. Pt exhibited a slow gait speed and decreased foot clearance during ambulation. 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 Pt ambulated back to room. Pt performed standing exercises, standing marches, calf raises, and mini squats with RW in front of him and chair behind. Pt needed multiple verbal cues to perform mini squats appropriately, without pulling on RW. Noted, pt putting significant weight through UEs on RW during mini squats.  Ended session with pt sitting in chair with call bell in reach. Noted plans for DC tomorrow to SNF. Progression toward goals:  [x]    Improving appropriately and progressing toward goals  []    Improving slowly and progressing toward goals  []    Not making progress toward goals and plan of care will be adjusted     PLAN:  Patient continues to benefit from skilled intervention to address the above impairments. Continue treatment per established plan of care. Discharge Recommendations:  Florencio Hyatt  Further Equipment Recommendations for Discharge:  TBD     SUBJECTIVE:   Patient stated Can I stand up yet ??.    OBJECTIVE DATA SUMMARY:   Critical Behavior:  Neurologic State: Alert  Orientation Level: Oriented X4  Cognition: Follows commands     Functional Mobility Training:  Bed Mobility:     Supine to Sit: Stand-by assistance              Transfers:  Sit to Stand: Stand-by assistance  Stand to Sit: Contact guard assistance                             Balance:  Sitting: Intact  Standing: Impaired  Standing - Static: Good  Standing - Dynamic : Fair  Ambulation/Gait Training:  Distance (ft): 300 Feet (ft)  Assistive Device: Gait belt;Walker, rolling  Ambulation - Level of Assistance: Contact guard assistance        Gait Abnormalities: Decreased step clearance;Shuffling gait (extremely pronated R foot)        Base of Support: Narrowed     Speed/Lydia: Slow;Shuffled  Step Length: Left shortened;Right shortened                Therapeutic Exercises:   Standing marches 5x each side, with RW in front  Calf raises 10x, with RW in front  Mini squats 5x with RW in front    Pain:  Pain Scale 1: Numeric (0 - 10)  Pain Intensity 1: 0              Activity Tolerance:   WFL  Please refer to the flowsheet for vital signs taken during this treatment.   After treatment:   [x]    Patient left in no apparent distress sitting up in chair  []    Patient left in no apparent distress in bed  [x]    Call bell left within reach  []    Nursing notified  []    Caregiver present  [x]    Bed alarm activated    COMMUNICATION/COLLABORATION:   The patients plan of care was discussed with: Registered Nurse    Dianna Belle, SPT   Time Calculation: 18 mins      Regarding student involvement in patient care:  A student participated in this treatment session. Per CMS Medicare statements and APTA guidelines I certify that the following was true:  1. I was present and directly observed the entire session. 2. I made all skilled judgments and clinical decisions regarding care. 3. I am the practitioner responsible for assessment, treatment, and documentation.

## 2018-07-19 NOTE — PROGRESS NOTES
Problem: Falls - Risk of  Goal: *Absence of Falls  Document Robert Fall Risk and appropriate interventions in the flowsheet. Outcome: Progressing Towards Goal  Fall Risk Interventions:  Mobility Interventions: OT consult for ADLs, Patient to call before getting OOB         Medication Interventions: Patient to call before getting OOB, Teach patient to arise slowly    Elimination Interventions: Call light in reach    History of Falls Interventions: Bed/chair exit alarm, Door open when patient unattended        Problem: Pressure Injury - Risk of  Goal: *Prevention of pressure injury  Document Oj Scale and appropriate interventions in the flowsheet. Outcome: Progressing Towards Goal  Pressure Injury Interventions:  Sensory Interventions: Keep linens dry and wrinkle-free    Moisture Interventions: Absorbent underpads    Activity Interventions: Increase time out of bed, Pressure redistribution bed/mattress(bed type)    Mobility Interventions: HOB 30 degrees or less, Pressure redistribution bed/mattress (bed type)    Nutrition Interventions: Document food/fluid/supplement intake    Friction and Shear Interventions: Lift sheet               Comments: Oriented. No noted episodes of confusion. No pain. Voided once 3-7pm    Ambulating well/ SBA.

## 2018-07-19 NOTE — PROGRESS NOTES
Reason for Admission:   Pt was admitted on 7/17/18 d/t a L inguinal pain and inability to urinate. RRAT Score:     18             Do you (patient/family) have any concerns for transition/discharge? Pt will need rehab after hospitalization. Son is going to live with Pt after rehab to provide support. Plan for utilizing home health:     CM will recommend to Pt/family using [facility] after rehab stay. Pt is an ACO Pt and PCP is Dr. Angela Hendrikcs. Likelihood of readmission? MOD            Transition of Care Plan:      Pt lives alone but her 2 DTR live across the street from him. Pt Son is going to be moving in with him after the rehab stay. CM noted that both [address] [place] [address] have accepted Pt via [employer]. CM called Pt DTR Fara, cell: [phone] and had to leave a voicemail to see what the preference is for SNF. Anticipate Pt will be ready for discharge on Friday, 7/20/18. Chart indicated that family will be able to transport him in the car. CM will continue to monitor discharge plan. Care Management Interventions  PCP Verified by CM: Yes  Palliative Care Criteria Met (RRAT>21 & CHF Dx)?: No  Mode of Transport at Discharge:  Other (see comment)  Transition of Care Consult (CM Consult): SNF  Partner SNF: Yes  MyChart Signup: No  Discharge Durable Medical Equipment: No  Physical Therapy Consult: Yes  Occupational Therapy Consult: Yes  Speech Therapy Consult: No  Current Support Network: Own Home, Lives with Caregiver  Confirm Follow Up Transport: Family  Plan discussed with Pt/Family/Caregiver: Yes  Freedom of Choice Offered: Yes  Discharge Location  Discharge Placement: Skilled nursing facility    Sobeida Almonte [phone]

## 2018-07-19 NOTE — PROGRESS NOTES
Bedside verbal report given to Odin Valenzuela (oncoming nurse) with SBAR, Kardex, MAR, and recent results reviewed. Oj: 18, Mick: 4.

## 2018-07-19 NOTE — PROGRESS NOTES
General Surgery End of Shift Nursing Note    Bedside shift change report given to Davion Henley  (oncoming nurse) by Susan Castro (offgoing nurse). Report included the following information Summary    Shift worked:   7/19/18-------3-7P   Summary of shift:  Received report from [address] [place] [address] Rd S. Patient had PT. Patient walked to  SBA with cane. Patient eating no noted confusion. Family in room. Issues for physician to address:   Discharge 7/20? Number times ambulated in hallway past shift:     Number of times OOB to chair past shift: 1    Pain Management:  Current medication: Norco, Motrin  Patient states pain is manageable on current pain medication: YES, gave Ibuprofen between 3-7p    GI:    Current diet:  DIET GI LITE (POST SURGICAL)    Tolerating current diet: YES  Passing flatus: YES  Last Bowel Movement: 7/19/18   Appearance: normal    Respiratory:    Incentive Spirometer at bedside: yes  Patient instructed on use: YES    Patient Safety:    Falls Score: 4  Bed Alarm On? Yes in chair  Sitter?  No    Terra Mackey

## 2018-07-19 NOTE — PROGRESS NOTES
CM met with family. Family has chosen [address] CM notified [facility] that pt was not accepting offer. Barry Faith was notified and able to accept tomorrow. Family concerned for pt having a roommate as pt does not sleep well, inquiring to bed with no roommate. Facility does not have ability to accommodate at this time. Pt was given melatonin last night that helped. Facility can accommodate that if pt is sent with a script. Family will be here by 9am for discharge and will transport to facility by car.     Joanne Norwood, CARLYNN, RN  Care Manager [facility]  [phone]

## 2018-07-19 NOTE — PROGRESS NOTES
Bedside and Verbal shift change report given to Erin (oncoming nurse) by Nadja Dubose (offgoing nurse). Report included the following information SBAR, Kardex, Intake/Output, MAR and Recent Results.

## 2018-07-19 NOTE — PROGRESS NOTES
General Surgery End of Shift Nursing Note    Bedside shift change report given to Roselia Barajas (oncoming nurse) by Tran Montez. Natasha Mason RN (offgoing nurse). Report included the following information SBAR, Kardex, Intake/Output, MAR and Recent Results. Shift worked:   11a to 3p   Summary of shift:    tolerating diet and had BM today   Issues for physician to address:   none     Number times ambulated in hallway past shift: in room    Number of times OOB to chair past shift: 1    Pain Management:  Current medication: none  Patient states pain is manageable on current pain medication: YES    GI:    Current diet:  DIET GI LITE (POST SURGICAL)    Tolerating current diet: YES  Passing flatus: YES  Last Bowel Movement: today   Appearance: soft    Respiratory:    Incentive Spirometer at bedside: YES  Patient instructed on use: YES    Patient Safety:    Falls Score: 4  Bed Alarm On? YES  Sitter?  No    Kennedi Ruvalcaba RN

## 2018-07-19 NOTE — INTERDISCIPLINARY ROUNDS
Interdisciplinary Rounds were completed on this patient. Rounds included nursing, clinical care leader, pharmacy, and case management. Plan for the day:pain control, discharge planning,.  GI lite diet  Plan for the stay:continue current TX  Activity:up in chair   Anticipated discharge date: SNF tomorrow

## 2018-07-19 NOTE — PROGRESS NOTES
Surgery      Slept better    Kristin po    Had BM  Cont present RX    SNF tomorrow. Venkat Manriquez MD, [facility]

## 2018-07-20 VITALS
HEIGHT: 70 IN | DIASTOLIC BLOOD PRESSURE: 64 MMHG | TEMPERATURE: 97.5 F | RESPIRATION RATE: 16 BRPM | OXYGEN SATURATION: 98 % | BODY MASS INDEX: 26.83 KG/M2 | WEIGHT: 187.39 LBS | HEART RATE: 60 BPM | SYSTOLIC BLOOD PRESSURE: 111 MMHG

## 2018-07-20 PROCEDURE — 74011250637 HC RX REV CODE- 250/637: Performed by: FAMILY MEDICINE

## 2018-07-20 PROCEDURE — 74011250637 HC RX REV CODE- 250/637: Performed by: SURGERY

## 2018-07-20 RX ORDER — IBUPROFEN 200 MG
200 TABLET ORAL 3 TIMES DAILY
Qty: 42 TAB | Refills: 0 | Status: SHIPPED | OUTPATIENT
Start: 2018-07-20 | End: 2018-08-03

## 2018-07-20 RX ORDER — HYDROCODONE BITARTRATE AND ACETAMINOPHEN 5; 325 MG/1; MG/1
1 TABLET ORAL
Qty: 42 TAB | Refills: 0 | Status: SHIPPED | OUTPATIENT
Start: 2018-07-20 | End: 2018-07-27

## 2018-07-20 RX ORDER — LANOLIN ALCOHOL/MO/W.PET/CERES
3 CREAM (GRAM) TOPICAL
Qty: 30 TAB | Refills: 1 | Status: SHIPPED | OUTPATIENT
Start: 2018-07-20 | End: 2019-04-18

## 2018-07-20 RX ADMIN — STANDARDIZED SENNA CONCENTRATE AND DOCUSATE SODIUM 1 TABLET: 8.6; 5 TABLET, FILM COATED ORAL at 09:12

## 2018-07-20 RX ADMIN — Medication 10 ML: at 09:13

## 2018-07-20 RX ADMIN — PANTOPRAZOLE SODIUM 40 MG: 40 TABLET, DELAYED RELEASE ORAL at 09:12

## 2018-07-20 RX ADMIN — IBUPROFEN 200 MG: 200 TABLET, FILM COATED ORAL at 09:16

## 2018-07-20 RX ADMIN — Medication 10 ML: at 05:19

## 2018-07-20 RX ADMIN — FLUTICASONE FUROATE AND VILANTEROL TRIFENATATE 1 PUFF: 100; 25 POWDER RESPIRATORY (INHALATION) at 09:13

## 2018-07-20 NOTE — PROGRESS NOTES
Patient is discharging today to [facility]. Family is transporting him to facility today by car. FOC signed and placed on chart. 2nd IM reviewed, signed and placed on chart with copy to patient. Bedside call report form on chart for bedside nurse to call facility with report. Pt needs script for Melatonin to go with him to facility. Pt and family agreeable to discharge plan. Follow up appt added to AVS. NN notified, Evelena Side of discharge and plan thru in basket message. Care Management Interventions  PCP Verified by CM: Yes  Palliative Care Criteria Met (RRAT>21 & CHF Dx)?: No  Mode of Transport at Discharge:  Other (see comment)  Transition of Care Consult (CM Consult): SNF  Partner SNF: Yes  MyChart Signup: No  Discharge Durable Medical Equipment: No  Physical Therapy Consult: Yes  Occupational Therapy Consult: Yes  Speech Therapy Consult: No  Current Support Network: Own Home, Lives with Caregiver  Confirm Follow Up Transport: Family  Plan discussed with Pt/Family/Caregiver: Yes  Freedom of Choice Offered: Yes  Discharge Location  Discharge Placement: Skilled nursing facility    OSWALDO Alston, RN  Care Manager [facility]  [phone]

## 2018-07-20 NOTE — PROGRESS NOTES
Dc rendered per dc instruction sheet daughters at bedside answered all question. Pt has bilat hearing aids in his glasses and watch is on his person pt has own teeth no dentures and bag of belongings taken with family at dc. Report called to [facility] at [phone] spoke with [facility] RN SBAR report given. Awaiting volunteer services for transport off unit to personal vehicle with daughter's to transport pt to [facility] facility.

## 2018-07-20 NOTE — PROGRESS NOTES
General Surgery End of Shift Nursing Note    Bedside shift change report given to Samantha Cary RN (oncoming nurse) by Kavya Llamas RN (offgoing nurse). Report included the following information SBAR, Kardex, OR Summary, Procedure Summary, Intake/Output, MAR, Accordion, Recent Results and Med Rec Status. Shift worked:   7P-7A   Summary of shift:    Patient had a good night. No complaints of pain. Up to chair in beginning of shift. Possible discharge today. Issues for physician to address:        Number of times OOB to chair past shift: 1    Pain Management:  Current medication: NA    GI:    Current diet:  DIET GI LITE (POST SURGICAL)    Tolerating current diet: YES  Passing flatus: YES  Last Bowel Movement: several days ago    Respiratory:    Incentive Spirometer at bedside: YES  Patient instructed on use: YES    Patient Safety:    Falls Score: 3  Bed Alarm On? Yes  Sitter?  No    Hai Muller

## 2018-07-20 NOTE — DISCHARGE INSTRUCTIONS

## 2018-07-20 NOTE — DISCHARGE SUMMARY
Physician Discharge Summary     Patient ID:  Jennifer Alvarado  [id number]  89 y.o.  1940    Admit Date: 7/15/2018    Discharge Date: 7/20/2018    Admission Diagnoses: incarcerated hernia with bowel obstruction;Unilateral ingui*    Discharge Diagnoses:  Principal Problem:    Recurrent unilateral inguinal hernia with obstruction and without gangrene (7/16/2018)    Active Problems:    Unilateral inguinal hernia with obstruction and without gangrene (7/16/2018)      Incarcerated left inguinal hernia (7/17/2018)         Admission Condition: Fair    Discharge Condition: Good    Last Procedure: Procedure(s):  [place] [facility] Course:   Hospital course was complicated by patients baseline status, which added 3 days to the patient's length of stay. Consults: None    Disposition: SNF    Patient Instructions:   Current Discharge Medication List      START taking these medications    Details   HYDROcodone-acetaminophen (NORCO) 5-325 mg per tablet Take 1 Tab by mouth every four (4) hours as needed for up to 7 days. Max Daily Amount: 6 Tabs. Qty: 42 Tab, Refills: 0    Associated Diagnoses: Recurrent unilateral inguinal hernia with obstruction and without gangrene      ibuprofen (MOTRIN) 200 mg tablet Take 1 Tab by mouth three (3) times daily for 14 days. Qty: 42 Tab, Refills: 0    Associated Diagnoses: Recurrent unilateral inguinal hernia with obstruction and without gangrene      melatonin 3 mg tablet Take 1 Tab by mouth nightly. Qty: 30 Tab, Refills: 1    Associated Diagnoses: Recurrent unilateral inguinal hernia with obstruction and without gangrene         CONTINUE these medications which have NOT CHANGED    Details   omeprazole (PRILOSEC) 20 mg capsule TAKE 1 CAPSULES BY MOUTH DAILY. TO REDUCE STOMACH ACID  Qty: 90 Cap, Refills: 3    Comments: Generic For:PRILOSEC  20MG      aspirin delayed-release 81 mg tablet Take  by mouth daily.       pravastatin (PRAVACHOL) 40 mg tablet TAKE 1 TABLET EVERY DAY IN THE NIGHT TO HELP LOWER CHOLESTEROL  Qty: 30 Tab, Refills: 12    Comments: Generic For:PRAVACHOL 40MG Generic For:PRAVACHOL 40MG      cholecalciferol, vitamin d3, (VITAMIN D) 1,000 unit tablet Take  by mouth daily. Associated Diagnoses: Vitamin D deficiency      loratadine (CLARITIN) 10 mg tablet Take 10 mg by mouth daily. Associated Diagnoses: Allergic rhinitis, cause unspecified      coenzyme q10 30 mg capsule Take 30 mg by mouth three (3) times daily. MULTIVITAMINS (MULTIPLE VITAMIN PO) Take  by mouth daily. azelastine (ASTELIN) 137 mcg nasal spray 1 Spray by Nasal route two (2) times a day. Use in each nostril as directed     Associated Diagnoses: Allergic rhinitis, cause unspecified      GLUC HCL/CSANA/GLY-AM-GLY,MX/C (COSAMIN DS PO) Take  by mouth daily. ketoconazole (NIZORAL) 2 % topical cream       albuterol (PROVENTIL VENTOLIN) 2.5 mg /3 mL (0.083 %) nebulizer solution 3 mL by Nebulization route every four (4) hours as needed for Wheezing or Shortness of Breath. Qty: 1 Package, Refills: 1      ALBUTEROL SULFATE (PROAIR HFA IN) Take 2 puffs by inhalation four (4) times daily as needed. ADVAIR DISKUS 100-50 mcg/Dose diskus inhaler Take 1 puff by inhalation two (2) times a day. Associated Diagnoses: COPD (chronic obstructive pulmonary disease) ([address] Unspecified asthma(493.90)      hydrocortisone (WESTCORT) 0.2 % topical cream            Activity: No lifting, Driving, or Strenuous exercise for 6 weeks  Diet: Low fat, Low cholesterol  Wound Care: As directed    Follow-up with Dr Piter Brand in 1 week.       Signed:  Abigail Barger MD  [facility]  7/20/2018  11:44 AM

## 2018-07-20 NOTE — PROGRESS NOTES
Problem: Falls - Risk of  Goal: *Absence of Falls  Document Robert Fall Risk and appropriate interventions in the flowsheet. Outcome: Progressing Towards Goal  Fall Risk Interventions:  Mobility Interventions: Bed/chair exit alarm, Communicate number of staff needed for ambulation/transfer, Patient to call before getting OOB, Strengthening exercises (ROM-active/passive)         Medication Interventions: Patient to call before getting OOB, Teach patient to arise slowly    Elimination Interventions: Call light in reach, Toileting schedule/hourly rounds, Urinal in reach    History of Falls Interventions: Bed/chair exit alarm, Door open when patient unattended     Pt reminded to call for assist in getting up, bed alarm placed for safety precautions. No episodes of confusion overnight. Pt assisted by RNx2 to stand and void overnight. Denies pain. Surgical incision CDI. Problem: Pressure Injury - Risk of  Goal: *Prevention of pressure injury  Document Oj Scale and appropriate interventions in the flowsheet. Pressure Injury Interventions:  Sensory Interventions: Assess changes in LOC, Assess need for specialty bed, Float heels, Keep linens dry and wrinkle-free, Monitor skin under medical devices, Pad between skin to skin, Use 30-degree side-lying position    Moisture Interventions: Assess need for specialty bed    Activity Interventions: Assess need for specialty bed, Increase time out of bed, Chair cushion    Mobility Interventions: HOB 30 degrees or less, Turn and reposition approx.  every two hours(pillow and wedges), Float heels    Nutrition Interventions: Document food/fluid/supplement intake    Friction and Shear Interventions: Lift sheet

## 2018-07-23 ENCOUNTER — PATIENT OUTREACH (OUTPATIENT)
Dept: FAMILY MEDICINE CLINIC | Age: 78
End: 2018-07-23

## 2018-07-23 NOTE — OP NOTES
[address] [facility] [address]  MR#: [id number]  : 1940  ACCOUNT #: [de-identified]   DATE OF SERVICE: 2018    PREOPERATIVE DIAGNOSES:  Incarcerated hernia with bowel obstruction. POSTOPERATIVE DIAGNOSES:  Incarcerated sliding indirect left inguinal hernia containing bowel and bladder. PROCEDURE PERFORMED:  Open left inguinal hernia repair with mesh. SURGEON:  Stella Chavez MD    ANESTHESIA:  General endotracheal anesthesia. ANESTHESIOLOGIST:  Dr. Kathy Clements. ASSISTANT:  None. ESTIMATED BLOOD LOSS:  25 mL. COMPLICATIONS:  No operative complications. FINDINGS:  There is an incarcerated sliding indirect left inguinal hernia containing bowel and bladder. IMPLANT:  A portion of a 9 x 15 cm polyester ProGrip mesh. SPECIMENS REMOVED:  Left inguinal hernia sac. DESCRIPTION OF OPERATION IN DETAIL:  After appropriate consent was obtained, the patient who was competent and was brought to the operating room, made comfortable in supine position and administered general endotracheal anesthesia. Patient was prepped and draped in standard fashion. A 0.5% plain Marcaine solution was used to infiltrate the left inguinal area. A left groin incision was made and this was extended down through the Dolly's fascia to the external oblique. External oblique was split in the direction of fibers, down through the external ring. The inguinal nerve was identified and sacrificed, in light of the massive hernia and risk of postoperative neuralgia. The hernia sac was identified and freed up with a blunt dissection and elevated with a Penrose drain. The hernia sac was freed up from the cord structures and was found to be a large direct inguinal hernia, although with a moderately small direct floor defect.   The hernia sac was opened and found to contain bowel and bladder and the hernia contents were actually incorporated into the wall of the hernia sac inferiorly, making this a sliding hernia. The superior aspect of the hernia sac was excised down to the level of the peritoneal reflection. This was forwarded to pathology for examination. The inferior hernia sac with the adherent contents was returned to the abdominal cavity, and the inguinal floor was then repaired with interrupted 0 Ethibond sutures with good closure of the inguinal floor. At this time, a 9 x 15 cm piece of polyester ProGrip mesh was selected. This was trimmed to a typical hernia patch shaped with a lateral keyhole slit. This was tacked into the inguinal floor with an interrupted 2-0 Prolene sutures. The cord passing through the lateral slit. The mesh was tacked lateral to the cord structures as well, recreating the internal ring through the mesh. The cord structures were returned to the native location and the external oblique fibers were imbricated with a 0 Vicryl running stitch. The Dolly's fascia was approximated with a 3-0 Vicryl stitch. Deep dermis was approximated with a 4-0 Vicryl stitch, and the skin incision was closed with a 4-0 Monocryl subcuticular stitch. Wound was cleaned and dried and dressed with Dermabond. Once the hernia was repaired, a Maria catheter was placed and this was left in at the end of the case.       MD CALVIN Ugarte / TN  D: 07/23/2018 10:21     T: 07/23/2018 14:26  JOB #: [id number]  CC: Hank Strauss MD

## 2018-07-24 ENCOUNTER — PATIENT OUTREACH (OUTPATIENT)
Dept: FAMILY MEDICINE CLINIC | Age: 78
End: 2018-07-24

## 2018-07-25 ENCOUNTER — PATIENT OUTREACH (OUTPATIENT)
Dept: CASE MANAGEMENT | Age: 78
End: 2018-07-25

## 2018-07-25 NOTE — PROGRESS NOTES
Community Care Team documentation for patient in [facility]  Initial Follow Up       Patient was admitted to [facility] on 7/15/18 and discharged 7/20/18 to [address], [facility]. Hospital Discharge diagnosis:  Recurrent unilateral inguinal hernia with obstruction and without gangrene    RRAT score: 18    Advance Care Planning:  Not on file. PCP : Parth [place] MD  Nurse Navigator in PCP office: Jody Schuler  Note routed to Nurse Navigator team.    SNF Attending:  Soha Trujillo MD    Spoke with Arianna Hernandez and team at [facility]. Ensured patient arrived to SNF safely with admission packet in order. PT/OT providing skilled therapy in SNF. Balance is improving. Currently supervision with bed mobility, min assist with transfers, ambulating 40 ft with wide base cane and contact guard assist, dependent with stairs, contact guard with upper body bathing and dressing, min assist with lower body bathing and dressing. Requiring cues for safety awareness. Anticipate 3 more weeks LOS. Prior to admission patient lives with son who works during the day, and uses a quad cane at home. Discussed follow up appointment with General surgeon Zahida Hollingsworth MD, on 7/27 at 9:40 AM.  PCP follow up    [facility] will follow up weekly with Florencio Edmar until discharge. Medications were not reconciled and general patient assessment was not completed during this [facility] outreach.       Telma Melendez, MSN, RN, ACNS-BC, [facility]  Nurse Navigator, [employer] [phone]

## 2018-07-27 ENCOUNTER — OFFICE VISIT (OUTPATIENT)
Dept: SURGERY | Age: 78
End: 2018-07-27

## 2018-07-27 VITALS
BODY MASS INDEX: 26.92 KG/M2 | WEIGHT: 188 LBS | DIASTOLIC BLOOD PRESSURE: 77 MMHG | SYSTOLIC BLOOD PRESSURE: 144 MMHG | TEMPERATURE: 97.7 F | HEART RATE: 60 BPM | OXYGEN SATURATION: 94 % | HEIGHT: 70 IN

## 2018-07-27 DIAGNOSIS — K40.30 INCARCERATED LEFT INGUINAL HERNIA: Primary | ICD-10-CM

## 2018-07-27 NOTE — MR AVS SNAPSHOT
[address] [facility] 
[phone] Patient: Caroline Rodriguez MRN:  [id number] Visit Information Date & Time Provider Department Dept. Phone Encounter #  
 7/27/2018  9:40 AM Bronson Luevano MD [facility] [place] [address] [id number] Your Appointments 10/9/2018 11:30 AM  
PROCEDURE with PACEMAKER, [facility] [address] [place] [address]) Appt Note: BSC DCPM 6mo check, annual with Dr. Radha Chino  
[phone] [address]  
  
    
 10/9/2018 11:30 AM  
ESTABLISHED PATIENT with Alejandra Csaey MD  
[facility] [address] [place] [address]) Appt Note: BSC DCPM 6mo check, annual with Dr. Radha Chino  
[phone] [address] [place] [address] Upcoming Health Maintenance Date Due DTaP/Tdap/Td series (1 - Tdap) 1/31/2006 GLAUCOMA SCREENING Q2Y 9/21/2018 COLONOSCOPY 9/25/2018 Influenza Age 5 to Adult 8/1/2018 MEDICARE YEARLY EXAM 9/7/2018 Allergies as of 7/27/2018  Review Complete On: 7/27/2018 By: Kristin Jovel LPN No Known Allergies Current Immunizations  Reviewed on 9/6/2017 Name Date Influenza High Dose Vaccine PF 9/6/2017, 10/8/2015 Influenza Vaccine 11/15/2014 Pneumococcal Conjugate (PCV-13) 1/28/2015 Pneumococcal Polysaccharide (PPSV-23) 1/30/2006 TD Vaccine 1/30/2006 Zoster 9/13/2010 Not reviewed this visit You Were Diagnosed With   
  
 Codes Comments Incarcerated left inguinal hernia    -  Primary ICD-10-CM: K40.30 ICD-9-CM: 550.10 Vitals  BP Pulse Temp Height(growth percentile) Weight(growth percentile) SpO2  
 144/77 (BP 1 Location: Right arm, BP Patient Position: Sitting) 60 97.7 °F (36.5 °C) 5' 10\" (1.778 m) 188 lb (85.3 kg) 94% BMI Smoking Status 26.98 kg/m2 Former Smoker BMI and BSA Data Body Mass Index Body Surface Area  
 26.98 kg/m 2 2.05 m 2 Preferred Pharmacy [facility]acy Name [facility] [phone] Your Updated Medication List  
  
   
This list is accurate as of 7/27/18  9:46 AM.  Always use your most recent med list.  
  
  
  
  
 ADVAIR DISKUS 100-50 mcg/dose diskus inhaler Generic drug:  fluticasone-salmeterol Take 1 puff by inhalation two (2) times a day. aspirin delayed-release 81 mg tablet Take  by mouth daily. ASTELIN 137 mcg (0.1 %) nasal spray Generic drug:  azelastine 1 Spray by Nasal route two (2) times a day. Use in each nostril as directed CLARITIN 10 mg tablet Generic drug:  loratadine Take 10 mg by mouth daily. coenzyme Q-10 30 mg capsule Commonly known as:  CO Q-10 Take 30 mg by mouth three (3) times daily. COSAMIN DS PO Take  by mouth daily. HYDROcodone-acetaminophen 5-325 mg per tablet Commonly known as:  [place] Cuff Take 1 Tab by mouth every four (4) hours as needed for up to 7 days. Max Daily Amount: 6 Tabs. hydrocortisone valerate 0.2 % topical cream  
Commonly known as:  WESTCORT  
  
 ibuprofen 200 mg tablet Commonly known as:  MOTRIN Take 1 Tab by mouth three (3) times daily for 14 days. ketoconazole 2 % topical cream  
Commonly known as:  NIZORAL  
  
 melatonin 3 mg tablet Take 1 Tab by mouth nightly. MULTIPLE VITAMIN PO Take  by mouth daily. omeprazole 20 mg capsule Commonly known as:  PRILOSEC  
TAKE 1 CAPSULES BY MOUTH DAILY. TO REDUCE STOMACH ACID  
  
 pravastatin 40 mg tablet Commonly known as:  PRAVACHOL  
TAKE 1 TABLET EVERY DAY IN THE NIGHT TO HELP LOWER CHOLESTEROL  
  
 * PROAIR HFA IN  
 Take 2 puffs by inhalation four (4) times daily as needed. * albuterol 2.5 mg /3 mL (0.083 %) nebulizer solution Commonly known as:  PROVENTIL VENTOLIN  
3 mL by Nebulization route every four (4) hours as needed for Wheezing or Shortness of Breath. VITAMIN D3 1,000 unit tablet Generic drug:  cholecalciferol Take  by mouth daily. * Notice: This list has 2 medication(s) that are the same as other medications prescribed for you. Read the directions carefully, and ask your doctor or other care provider to review them with you. Introducing [facility]! Coy Metcalf introduces [employer] patient portal. Now you can access parts of your medical record, email your doctor's office, and request medication refills online. 1. In your internet browser, go to [email or web address] 2. Click on the First Time User? Click Here link in the Sign In box. You will see the New Member Sign Up page. 3. Enter your [employer] Access Code exactly as it appears below. You will not need to use this code after youve completed the sign-up process. If you do not sign up before the expiration date, you must request a new code. · [employer] Access Code: [email or web address] Expires: 10/13/2018  9:02 PM 
 
4. Enter the last four digits of your Social Security Number (xxxx) and Date of Birth (mm/dd/yyyy) as indicated and click Submit. You will be taken to the next sign-up page. 5. Create a [employer] ID. This will be your [employer] login ID and cannot be changed, so think of one that is secure and easy to remember. 6. Create a [employer] password. You can change your password at any time. 7. Enter your Password Reset Question and Answer. This can be used at a later time if you forget your password. 8. Enter your e-mail address. You will receive e-mail notification when new information is available in [address] 9. Click Sign Up. You can now view and download portions of your medical record. 10. Click the Download Summary menu link to download a portable copy of your medical information. If you have questions, please visit the Frequently Asked Questions section of the [employer] website. Remember, [employer] is NOT to be used for urgent needs. For medical emergencies, dial 911. Now available from your iPhone and Android! Please provide this summary of care documentation to your next provider. Your primary care clinician is listed as Bozena Alejandra Dr. If you have any questions after today's visit, please call [phone].

## 2018-07-27 NOTE — PROGRESS NOTES
1. Have you been to the ER, urgent care clinic since your last visit? Hospitalized since your last visit? No 
 
2. Have you seen or consulted any other health care providers outside of the [facility] since your last visit? Include any pap smears or colon screening. No  
 
Discussed advanced directive. Patient states that he does not have an advanced directive.

## 2018-07-27 NOTE — PROGRESS NOTES
SUBJECTIVE: Sánchez Phillips is a 66 y.o. male is seen for a routine postop check s/p incarcerated left inguinal hernia repair. Reports no problems with the wound or other issues. Activity, diet and bowels are normal. No pain. OBJECTIVE: Appears well. Wound is well healed without complications or infection. No recurrence ASSESSMENT: normal postoperative course, doing well. PLAN: Patient is instructed to avoid heavy lifting for 4 more weeks. Return PRN for any problems or concerns.

## 2018-07-28 NOTE — PROGRESS NOTES
Transition of Care Coordination/Hospital to Post Acute Facility: 
  
Date/Time:  18 10:41 AM 
 
Patient was admitted to [facility] on 7/15/18 and discharged on 18 for Recurrent unilateral inguinal hernia with obstruction and without gangrene, Incarcerated left inguinal hernia, LEFT INGUINAL HERNIA  REPAIR. Patient was transferred to  [facility] for continuation of care. Inpatient RRAT score: 18 Top Challenges reviewed - S/P Ing Hernia Repair- 18 WBC-10.3. Surgery f/u in 1 week. Appt to be scheduled 
 
- CKD Stage 3- baseline Creatinine ~ 1.4. Managed by Aldair Shirley. Last f/u 2/15/18. Recommendation 6 month f/u. Restrict Na intake to 2000 mg /day. Avoid NSAIDs. 18 Creatinine 1.31, GRF est non-AA 53 - Last PCP f/u 17 Method of communication with provider :chart routing Nurse Navigator(NN) spoke with SNF nurse Steve to provide introduction to self and explanation of the Nurse Navigator Role. Verified name and  as patient identifiers. Discussed and reviewed  follow up appointments. NN advised need to schedule Gen Surg 1 week f/u appt. SNF [facility] [place] Francois Vilchis to schedule. SNF nurse unable to do med reconciliation during call. Stated would fax copy of current SNF med list to PCP office. SNF nurse reported pt Team Meeting was scheduled for today @ 10:30 AM. ACP:  
Does the patient have a current ACP (including DDNR):  no 
Does the post acute facility have a copy of the patients ACP:  N/A Medication(s):  
New Medications at Discharge: HYDROcodone-acetaminophen (NORCO) 5-325 mg per tablet, ibuprofen (MOTRIN) 200 mg tablet, melatonin 3 mg tablet Changed Medications at Discharge: none Discontinued Medications at Discharge: none PCP/Specialist follow up:  
Future Appointments Date Time Provider Sudarshan Watkins 10/9/2018 11:30 AM MD ALFREDO Ochoa  
10/9/2018 11:30 AM PACEMAKER, [address] Opportunity to ask questions was provided. Contact information was provided for future reference or further questions. Will continue to monitor. Goals Addressed  Transitions of Care- collaboration & care coordination to prevent complications post hospitalization. 7/23/18- spoke w/ SNF nurse Katheryn Cordova. Unable to do med reconciliation during call. Will fax copy of SNF med list to PCP office. NN advised of need to schedule 1 week Gen f/u. SNF nurse to inform [facility] [place] to schedule. SNF [facility]. Team Meeting scheduled for today. Plan- SNF [facility] [place] to schedule pt's 1 week f/u w/ Gen Surgery. NN to collaborate w/ SNF staff as needed for care coordination. Next NN f/u ~ 1 week.

## 2018-07-30 ENCOUNTER — PATIENT OUTREACH (OUTPATIENT)
Dept: FAMILY MEDICINE CLINIC | Age: 78
End: 2018-07-30

## 2018-07-30 NOTE — PROGRESS NOTES
NN F/U Progress Note Goals Addressed  Transitions of Care- collaboration & care coordination to prevent complications post hospitalization. 7/30/18- reviewed pt's med list received from SNF. Noted Ibuprofen listed as a current med. Call to SNF. Spoke w/ Nurse Manager Linnea Geronimo. Advised pt followed by Mckenzie Quevedo for CKD Stage 3. Advised pt is to restrict Na intake to 2000 mg /day & avoid NSAIDs. Nurse Jef Hooks reported pt was on med. Order received from Celeste Hinojosa. Would inform SNF provider to discontinue med. Plan- route chart to [facility]. Request to confirm @ weekly call Ibuprofen d/c'd. Inquire if BMP done since @ SNF. Pt needs Neph f/u scheduled if an appt not already scheduled. Last f/u 2/2018. 6 month f/u was recommended-ID. 7/24/18- call from [facility] ROSALBA Cisneros. Reported pt's d/c plan is to return home w/ his son. Est SNF LOS 3 week. Plan- NN to continue collaboration w/ SNF staff re [facility] dc date/plan-ID. 7/23/18- spoke w/ SNF nurse Scott Seaman. Unable to do med reconciliation during call. Will fax copy of SNF med list to PCP office. NN advised of need to schedule 1 week Gen f/u. SNF nurse to inform [facility] [place] to schedule. [facility] ROSALBA Cisneros. Team Meeting scheduled for today. Plan- SNF [facility] [place] to schedule pt's 1 week f/u w/ Gen Surgery. NN to collaborate w/ SNF staff as needed for care coordination. Next NN f/u ~ 1 week.

## 2018-07-30 NOTE — Clinical Note
Mattie or Carlin Stein- whomever does the CCT call this week can you please check on my goal/plan entry for today. Thanks.

## 2018-08-01 ENCOUNTER — PATIENT OUTREACH (OUTPATIENT)
Dept: CASE MANAGEMENT | Age: 78
End: 2018-08-01

## 2018-08-01 NOTE — PROGRESS NOTES
Community Care Team Documentation for Patient in [facility] Subsequent Follow up Patient remains at [address] ([facility]). See previous [facility] Team notes. PCP : Mary Munoz MD 
Nurse Navigator in PCP office: Reynatishane Rolanshayne Spoke with Genaro Gibson and team at [facility]. PT/OT continue. Currently ambulating with rolling walker with mod I and progressing to cane. Up/down stairs with supervision to contact guard assist; supervision with toileting and bathing and dressing. Patient attended folow up appointment with Dr. Brian Lynn, surgeon on 7/27 - Avoid heavy lifting x 4 more weeks. Plan for discharge 8/7 to home with son and [facility]. PCP follow up 8/14 at 10:00 AM. Medications were not reconciled and general patient assessment was not completed during this skilled nursing facility outreach. Rk Byers, MSN, RN, ACNS-BC, [facility] Nurse Navigator, [address] [place] [address] [phone]

## 2018-08-09 LAB — CREATININE, EXTERNAL: 1.07

## 2018-08-10 ENCOUNTER — PATIENT OUTREACH (OUTPATIENT)
Dept: CASE MANAGEMENT | Age: 78
End: 2018-08-10

## 2018-08-10 NOTE — PROGRESS NOTES
Community Care Team Documentation for Patient in [facility]  Discharge Note    Patient has been discharged from [address] [place] [address] ([facility]). See previous [facility] Team notes. PCP : Hortencia Zhang MD  Nurse Navigator in PCP office: [place]  Note routed to Nurse Navigator team.    Spoke with Magdalena Llamas and team at [facility] ([facility]). Confirmed pt was discharged 8/3 home with son and outpatient therapy. SNF discharge instructions sent to PCP office. PCP follow up 8/14 at 10:00 AM.     [facility] Team will sign off at this time. Medications were not reconciled and general patient assessment was not completed during this skilled nursing facility outreach.

## 2018-08-14 ENCOUNTER — OFFICE VISIT (OUTPATIENT)
Dept: FAMILY MEDICINE CLINIC | Age: 78
End: 2018-08-14

## 2018-08-14 VITALS
HEIGHT: 68 IN | BODY MASS INDEX: 28.37 KG/M2 | DIASTOLIC BLOOD PRESSURE: 76 MMHG | TEMPERATURE: 96.9 F | WEIGHT: 187.2 LBS | RESPIRATION RATE: 16 BRPM | SYSTOLIC BLOOD PRESSURE: 121 MMHG | HEART RATE: 60 BPM | OXYGEN SATURATION: 98 %

## 2018-08-14 DIAGNOSIS — Z87.19 STATUS POST BILATERAL INGUINAL HERNIA REPAIR: Primary | ICD-10-CM

## 2018-08-14 DIAGNOSIS — J44.9 CHRONIC OBSTRUCTIVE PULMONARY DISEASE, UNSPECIFIED COPD TYPE (HCC): ICD-10-CM

## 2018-08-14 DIAGNOSIS — I49.5 SSS (SICK SINUS SYNDROME) (HCC): Chronic | ICD-10-CM

## 2018-08-14 DIAGNOSIS — J84.112 IDIOPATHIC PULMONARY FIBROSIS (HCC): ICD-10-CM

## 2018-08-14 DIAGNOSIS — Z98.890 STATUS POST BILATERAL INGUINAL HERNIA REPAIR: Primary | ICD-10-CM

## 2018-08-14 PROBLEM — K40.31 RECURRENT UNILATERAL INGUINAL HERNIA WITH OBSTRUCTION AND WITHOUT GANGRENE: Status: RESOLVED | Noted: 2018-07-16 | Resolved: 2018-08-14

## 2018-08-14 PROBLEM — K40.30 UNILATERAL INGUINAL HERNIA WITH OBSTRUCTION AND WITHOUT GANGRENE: Status: RESOLVED | Noted: 2018-07-16 | Resolved: 2018-08-14

## 2018-08-14 PROBLEM — K40.30 INCARCERATED LEFT INGUINAL HERNIA: Status: RESOLVED | Noted: 2018-07-17 | Resolved: 2018-08-14

## 2018-08-14 RX ORDER — AZELASTINE HCL 205.5 UG/1
SPRAY NASAL 2 TIMES DAILY
COMMUNITY
End: 2019-04-18

## 2018-08-14 RX ORDER — SILDENAFIL 100 MG/1
100 TABLET, FILM COATED ORAL AS NEEDED
COMMUNITY
End: 2019-04-18

## 2018-08-14 RX ORDER — NITROGLYCERIN 400 UG/1
1 SPRAY ORAL
COMMUNITY
End: 2019-04-18

## 2018-08-14 NOTE — MR AVS SNAPSHOT
[facility] 
 
 
 [address] [facility] 
[address] [facility] [address] 
[phone] Patient: Rayshawn Mobley MRN:  [id number] Visit Information Date & Time Provider Department Dept. Phone Encounter #  
 8/14/2018 10:00 AM Mahad Ivy MD [facility] [phone] [id number] Follow-up Instructions Return in about 2 months (around 10/14/2018) for [address] Your Appointments 10/9/2018 11:30 AM  
PROCEDURE with PACEMAKER, [facility] Appt Note: BS DCPM 6mo check, annual with Dr. [address]  
[phone] [id number] [facility] [address]  
  
    
 10/9/2018 11:30 AM  
ESTABLISHED PATIENT with Dagmar Garcia MD  
[facility] Appt Note: BS DCPM 6mo check, annual with Dr. Jerry [address]  
[phone] [id number] [facility] [address] Upcoming Health Maintenance Date Due DTaP/Tdap/Td series (1 - Tdap) 1/31/2006 GLAUCOMA SCREENING Q2Y 9/21/2018 COLONOSCOPY 9/25/2018 Influenza Age 5 to Adult 9/28/2018* MEDICARE YEARLY EXAM 9/7/2018 *Topic was postponed. The date shown is not the original due date. Allergies as of 8/14/2018  Review Complete On: 8/14/2018 By: Mahad Ivy MD  
 No Known Allergies Current Immunizations  Reviewed on 9/6/2017 Name Date Influenza High Dose Vaccine PF 9/6/2017, 10/8/2015 Influenza Vaccine 11/15/2014 Pneumococcal Conjugate (PCV-13) 1/28/2015 Pneumococcal Polysaccharide (PPSV-23) 1/30/2006 TD Vaccine 1/30/2006 Zoster 9/13/2010 Not reviewed this visit You Were Diagnosed With   
  
 Codes Comments Status post bilateral inguinal hernia repair    -  Primary ICD-10-CM: Z98.890, Z87.19 ICD-9-CM: V45.89   
 Chronic obstructive pulmonary disease, unspecified COPD type ([facility]     ICD-10-CM: J44.9 ICD-9-CM: 866 Idiopathic pulmonary fibrosis ([facility]     ICD-10-CM: Y78.168 ICD-9-CM: 516.31   
 SSS (sick sinus syndrome) ([facility])     ICD-10-CM: I49.5 ICD-9-CM: 427.81 Vitals BP Pulse Temp Resp Height(growth percentile) Weight(growth percentile) 121/76 (BP 1 Location: Left arm, BP Patient Position: Sitting) 60 96.9 °F (36.1 °C) (Oral) 16 5' 8.31\" (1.735 m) 187 lb 3.2 oz (84.9 kg) SpO2 BMI Smoking Status 98% 28.21 kg/m2 Former Smoker Vitals History BMI and BSA Data Body Mass Index Body Surface Area  
 28.21 kg/m 2 2.02 m 2 Preferred Pharmacy Pharmacy Name Phone [facility] [phone] Your Updated Medication List  
  
   
This list is accurate as of 8/14/18 10:49 AM.  Always use your most recent med list.  
  
  
  
  
 ADVAIR DISKUS 100-50 mcg/dose diskus inhaler Generic drug:  fluticasone-salmeterol Take 1 puff by inhalation two (2) times a day. aspirin delayed-release 81 mg tablet Take  by mouth daily. * ASTELIN 137 mcg (0.1 %) nasal spray Generic drug:  azelastine 1 Spray by Nasal route two (2) times a day. Use in each nostril as directed * ASTEPRO 0.15 % (205.5 mcg) nasal spray Generic drug:  Azelastine  
two (2) times a day. CLARITIN 10 mg tablet Generic drug:  loratadine Take 10 mg by mouth daily. coenzyme Q-10 30 mg capsule Commonly known as:  CO Q-10 Take 30 mg by mouth three (3) times daily. COSAMIN DS PO Take  by mouth daily. hydrocortisone valerate 0.2 % topical cream  
Commonly known as:  WESTCORT  
  
 ketoconazole 2 % topical cream  
Commonly known as:  NIZORAL  
  
 melatonin 3 mg tablet Take 1 Tab by mouth nightly. MULTIPLE VITAMIN PO Take  by mouth daily. NITROLINGUAL 400 mcg/spray spray Generic drug:  nitroglycerin 1 Spray by SubLINGual route every five (5) minutes as needed for Chest Pain. omeprazole 20 mg capsule Commonly known as:  PRILOSEC  
TAKE 1 CAPSULES BY MOUTH DAILY. TO REDUCE STOMACH ACID  
  
 pravastatin 40 mg tablet Commonly known as:  PRAVACHOL  
TAKE 1 TABLET EVERY DAY IN THE NIGHT TO HELP LOWER CHOLESTEROL  
  
 * PROAIR HFA IN Take 2 puffs by inhalation four (4) times daily as needed. * albuterol 2.5 mg /3 mL (0.083 %) nebulizer solution Commonly known as:  PROVENTIL VENTOLIN  
3 mL by Nebulization route every four (4) hours as needed for Wheezing or Shortness of Breath. VIAGRA 100 mg tablet Generic drug:  sildenafil citrate Take 100 mg by mouth as needed. VITAMIN D3 1,000 unit tablet Generic drug:  cholecalciferol Take  by mouth daily. * Notice: This list has 4 medication(s) that are the same as other medications prescribed for you. Read the directions carefully, and ask your doctor or other care provider to review them with you. We Performed the Following REFERRAL TO PHYSICAL THERAPY [HGH24 Custom] Follow-up Instructions Return in about 2 months (around 10/14/2018) for [address] [place] [address]. Referral Information Referral ID Referred By Referred To  
  
 [id number] Neetu Espinoza [facility] PHYSICAL THERAPY   
   [address], [address] Phone: [phone] Visits Status Start Date End Date 1 New Request 8/14/18 8/14/19 If your referral has a status of pending review or denied, additional information will be sent to support the outcome of this decision. Introducing [facility]! Anisa Hyatt introduces NowSpots patient portal. Now you can access parts of your medical record, email your doctor's office, and request medication refills online. 1. In your internet browser, go to [email or web address] 2. Click on the First Time User? Click Here link in the Sign In box. You will see the New Member Sign Up page. 3. Enter your Solarus Access Code exactly as it appears below. You will not need to use this code after youve completed the sign-up process. If you do not sign up before the expiration date, you must request a new code. · Solarus Access Code: [email or web address] Expires: 10/13/2018  9:02 PM 
 
4. Enter the last four digits of your Social Security Number (xxxx) and Date of Birth (mm/dd/yyyy) as indicated and click Submit. You will be taken to the next sign-up page. 5. Create a Solarus ID. This will be your Solarus login ID and cannot be changed, so think of one that is secure and easy to remember. 6. Create a Solarus password. You can change your password at any time. 7. Enter your Password Reset Question and Answer. This can be used at a later time if you forget your password. 8. Enter your e-mail address. You will receive e-mail notification when new information is available in [address] 9. Click Sign Up. You can now view and download portions of your medical record. 10. Click the Download Summary menu link to download a portable copy of your medical information. If you have questions, please visit the Frequently Asked Questions section of the Solarus website. Remember, Solarus is NOT to be used for urgent needs. For medical emergencies, dial 911. Now available from your iPhone and Android! Please provide this summary of care documentation to your next provider. Your primary care clinician is listed as [id number] If you have any questions after today's visit, please call [phone].

## 2018-08-14 NOTE — ASSESSMENT & PLAN NOTE
This condition is managed by Specialist.  Key COPD Medications             albuterol (PROVENTIL VENTOLIN) 2.5 mg /3 mL (0.083 %) nebulizer solution  (Taking) 3 mL by Nebulization route every four (4) hours as needed for Wheezing or Shortness of Breath. ALBUTEROL SULFATE (PROAIR HFA IN)  (Taking) Take 2 puffs by inhalation four (4) times daily as needed. ADVAIR DISKUS 100-50 mcg/Dose diskus inhaler  (Taking) Take 1 puff by inhalation two (2) times a day.         Lab Results   Component Value Date/Time    WBC 10.3 07/18/2018 06:20 AM    HGB 15.3 07/18/2018 06:20 AM    HCT 45.4 07/18/2018 06:20 AM    PLATELET 104 84/25/0181 06:20 AM

## 2018-08-14 NOTE — ASSESSMENT & PLAN NOTE
This condition is managed by Specialist.  Key CAD CHF Meds             nitroglycerin (NITROLINGUAL) 400 mcg/spray spray  (Taking) 1 Spray by SubLINGual route every five (5) minutes as needed for Chest Pain. aspirin delayed-release 81 mg tablet  (Taking) Take  by mouth daily.     pravastatin (PRAVACHOL) 40 mg tablet  (Taking) TAKE 1 TABLET EVERY DAY IN THE NIGHT TO HELP LOWER CHOLESTEROL        [id number](BNP,BNPP,BNPPPOC,HSCRP,NA,NAPOC,K,KPOCT,CHOL,CHOLPOCT,HDL,HDLPOC,LDLCHOL,LDLPOCT,LDLCPOC,LDLC,LDL,LDLCEXT,TRIGL,TGLPOCT,INR,INREXT,INRPOC,PTP,PTINR,PTEXT,DIG)@

## 2018-08-14 NOTE — PROGRESS NOTES
Lisa Garcia  Identified pt with two pt identifiers(name and ). Chief Complaint   Patient presents with   [facility] Follow Up     ED [facility] 07/15/2018    Referral Request     PT; wants to go to [facility] of [facility]       1. Have you been to the ER, urgent care clinic since your last visit? Hospitalized since your last visit? ED [facility] 07/15/2018    2. Have you seen or consulted any other health care providers outside of the [facility] since your last visit? Include any pap smears or colon screening. Ozzy Ramos, Dr. Berlin Lesch for eye exam    In the event something were to happen to you and you were unable to speak on your behalf, do you have an Advance Directive/ Living Will in place stating your wishes? NO    If yes, do we have a copy on file NO    If no, would you like information:          Medication reconciliation up to date and corrected with patient at this time. Today's provider has been notified of reason for visit, vitals and flowsheets obtained on patients. Reviewed record in preparation for visit, huddled with provider and have obtained necessary documentation.       Health Maintenance Due   Topic    DTaP/Tdap/Td series (1 - Tdap)    GLAUCOMA SCREENING Q2Y     COLONOSCOPY        Wt Readings from Last 3 Encounters:   18 187 lb 3.2 oz (84.9 kg)   18 188 lb (85.3 kg)   07/15/18 187 lb 6.3 oz (85 kg)     Temp Readings from Last 3 Encounters:   18 97.7 °F (36.5 °C)   18 97.5 °F (36.4 °C)   17 96.2 °F (35.7 °C) (Oral)     BP Readings from Last 3 Encounters:   18 144/77   18 111/64   17 92/60     Pulse Readings from Last 3 Encounters:   18 60   18 60   17 70     Vitals:    18 1018   BP: 121/76   Pulse: 60   Resp: 16   Temp: 96.9 °F (36.1 °C)   TempSrc: Oral   SpO2: 98%   Weight: 187 lb 3.2 oz (84.9 kg)   Height: 5' 8.31\" (1.735 m)   PainSc:   0 - No pain         Learning Assessment:  :     Learning Assessment 5/5/2014   PRIMARY LEARNER Patient   BARRIERS PRIMARY LEARNER NONE   CO-LEARNER CAREGIVER No   PRIMARY LANGUAGE ENGLISH    NEED No   LEARNER PREFERENCE PRIMARY READING   LEARNING SPECIAL TOPICS n/a   ANSWERED BY patient   RELATIONSHIP SELF       Depression Screening:  :     PHQ over the last two weeks 7/27/2018   Little interest or pleasure in doing things Not at all   Feeling down, depressed, irritable, or hopeless Not at all   Total Score PHQ 2 0       Fall Risk Assessment:  :     Fall Risk Assessment, last 12 mths 7/27/2018   Able to walk? Yes   Fall in past 12 months? Yes   Fall with injury? No   Number of falls in past 12 months 2   Fall Risk Score 2       Abuse Screening:  :     Abuse Screening Questionnaire 8/14/2018   Do you ever feel afraid of your partner? N   Are you in a relationship with someone who physically or mentally threatens you? N   Is it safe for you to go home?  Y       ADL Screening:  :     ADL Assessment 8/14/2018   Feeding yourself No Help Needed   Getting from bed to chair No Help Needed   Getting dressed No Help Needed   Bathing or showering No Help Needed   Walk across the room (includes cane/walker) Help Needed   Using the telphone No Help Needed   Taking your medications No Help Needed   Preparing meals No Help Needed   Managing money (expenses/bills) No Help Needed   Moderately strenuous housework (laundry) No Help Needed   Shopping for personal items (toiletries/medicines) No Help Needed   Shopping for groceries No Help Needed   Driving Help Needed   Climbing a flight of stairs No Help Needed   Getting to places beyond walking distances No Help Needed

## 2018-08-14 NOTE — ASSESSMENT & PLAN NOTE
This condition is managed by Specialist.  Key COPD Medications             albuterol (PROVENTIL VENTOLIN) 2.5 mg /3 mL (0.083 %) nebulizer solution  (Taking) 3 mL by Nebulization route every four (4) hours as needed for Wheezing or Shortness of Breath. ALBUTEROL SULFATE (PROAIR HFA IN)  (Taking) Take 2 puffs by inhalation four (4) times daily as needed. ADVAIR DISKUS 100-50 mcg/Dose diskus inhaler  (Taking) Take 1 puff by inhalation two (2) times a day.         Lab Results   Component Value Date/Time    WBC 10.3 07/18/2018 06:20 AM    HGB 15.3 07/18/2018 06:20 AM    HCT 45.4 07/18/2018 06:20 AM    PLATELET 527 44/98/1599 06:20 AM

## 2018-08-31 ENCOUNTER — PATIENT OUTREACH (OUTPATIENT)
Dept: FAMILY MEDICINE CLINIC | Age: 78
End: 2018-08-31

## 2018-08-31 NOTE — PROGRESS NOTES
Patient has graduated from the Transitions of Care Coordination  program on 8/30/18. Patient's symptoms are stable at this time. Patient/family has the ability to self-manage. Care management goals have been completed at this time. No further nurse navigator follow up scheduled. Goals Addressed  COMPLETED: Transitions of Care- collaboration & care coordination to prevent complications post hospitalization. 8/31/18- per EMR review pt d/c'd from SNF/Rehab on 8/3/18. Pt attended Gen Surg f/u on 7/27/18 & PCP f/u on 8/14/18. Plan- NN NAGI Episode resolved. NN removed from Care Team List-ID 
 
7/30/18- reviewed pt's med list received from SNF. Noted Ibuprofen listed as a current med. Call to SNF. Spoke w/ Nurse Manager Suhas Cha. Advised pt followed by Priyanka Garibay for CKD Stage 3. Advised pt is to restrict Na intake to 2000 mg /day & avoid NSAIDs. Nurse [facility] House reported pt was on med. Order received from Celeste Hinojosa. Would inform SNF provider to discontinue med. Plan- route chart to [facility]. Request to confirm @ weekly call Ibuprofen d/c'd. Inquire if BMP done since @ SNF. Pt needs Neph f/u scheduled if an appt not already scheduled. Last f/u 2/2018. 6 month f/u was recommended-ID. 7/24/18- call from [facility] ROSALBA English. Reported pt's d/c plan is to return home w/ his son. Est SNF LOS 3 week. Plan- NN to continue collaboration w/ SNF staff re [facility] dc date/plan-ID. 7/23/18- spoke w/ SNF nurse Shelly Jean Baptiste. Unable to do med reconciliation during call. Will fax copy of SNF med list to PCP office. NN advised of need to schedule 1 week Gen f/u. SNF nurse to inform [facility] [place] to schedule. [facility] ROSALBA English. Team Meeting scheduled for today. Plan- SNF [facility] [place] to schedule pt's 1 week f/u w/ Gen Surgery. NN to collaborate w/ SNF staff as needed for care coordination. Next NN f/u ~ 1 week.   
  
  
 
 
Pt has nurse navigator's contact information for any further questions, concerns, or needs. Patients upcoming visits:  Future Appointments Date Time Provider Sudarshan Watkins 10/9/2018 11:30 AM MD ALFREDO Alexander  
10/9/2018 11:30 AM PACEMAKER, [facility] [address] [facility]

## 2018-10-09 ENCOUNTER — OFFICE VISIT (OUTPATIENT)
Dept: CARDIOLOGY CLINIC | Age: 78
End: 2018-10-09

## 2018-10-09 ENCOUNTER — CLINICAL SUPPORT (OUTPATIENT)
Dept: CARDIOLOGY CLINIC | Age: 78
End: 2018-10-09

## 2018-10-09 VITALS
HEART RATE: 66 BPM | SYSTOLIC BLOOD PRESSURE: 116 MMHG | DIASTOLIC BLOOD PRESSURE: 70 MMHG | HEIGHT: 69 IN | RESPIRATION RATE: 18 BRPM | BODY MASS INDEX: 28.14 KG/M2 | WEIGHT: 190 LBS

## 2018-10-09 DIAGNOSIS — I44.2 CHB (COMPLETE HEART BLOCK) (HCC): ICD-10-CM

## 2018-10-09 DIAGNOSIS — Z95.0 PACEMAKER: Primary | ICD-10-CM

## 2018-10-09 DIAGNOSIS — I49.5 SSS (SICK SINUS SYNDROME) (HCC): Primary | Chronic | ICD-10-CM

## 2018-10-09 DIAGNOSIS — I49.5 SSS (SICK SINUS SYNDROME) (HCC): Chronic | ICD-10-CM

## 2018-10-09 DIAGNOSIS — E78.2 MIXED HYPERLIPIDEMIA: ICD-10-CM

## 2018-10-09 DIAGNOSIS — I10 ESSENTIAL HYPERTENSION: ICD-10-CM

## 2018-10-09 RX ORDER — PRAVASTATIN SODIUM 40 MG/1
TABLET ORAL
Qty: 30 TAB | Refills: 12 | OUTPATIENT
Start: 2018-10-09

## 2018-10-09 NOTE — PROGRESS NOTES
Subjective:      Dimas Hinds is a 66 y.o. male is here for EP consult. The patient denies chest pain/ shortness of breath, orthopnea, PND, LE edema, palpitations, syncope, presyncope or fatigue.        Patient Active Problem List    Diagnosis Date Noted    Status post bilateral inguinal hernia repair 08/14/2018    SSS (sick sinus syndrome) ([facility] 05/25/2017    Hematuria 08/26/2016    Hypertension 05/06/2016    Idiopathic pulmonary fibrosis ([facility] 01/28/2015    CAD (coronary artery disease)     Hypercholesterolemia     ED (erectile dysfunction) 10/31/2013    Family history of colon cancer 09/25/2013    Colon polyp 09/25/2013    Diverticulosis 09/25/2013    CKD (chronic kidney disease) 01/31/2012    Vitamin D deficiency 07/21/2011    BPH (benign prostatic hypertrophy) with urinary obstruction 07/21/2011    Acne rosacea 07/21/2011    COPD (chronic obstructive pulmonary disease) ([facility] 07/13/2010    GERD (gastroesophageal reflux disease) 07/13/2010      Yamile Schmitz MD  Past Medical History:   Diagnosis Date    Arthritis     parris knee    Asthma     dr Dung Castellano 5/18/17    Basal cell carcinoma of skin 05/10/2017    right helix Jose Davidclotilde Baez's note rec'd    CAD (coronary artery disease)     dr Kezia Machado CKD (chronic kidney disease), stage III ([facility] 05/2014    Abou Assi notes 8/8/17 note and lab    Colon polyps 09/25/13    also dad with colon cancer    Contact dermatitis and other eczema, due to unspecified cause     eczema dr Alvin Hood    COPD     dr Jamel Blanc chronic bronchitis     5/18/17    Elevated serum creatinine     Fall 11 & 12/2012    Right knee gave away and tripped in his house    GERD (gastroesophageal reflux disease)     dr Irving Galvan (hard of hearing)     Hypercholesterolemia     ILD (interstitial lung disease) ([facility] 07/15/2014    Dr Júnior Manley's   pulmonary fibrosis 5/18/17 f/u note    Proteinuria 10/2012    abou assi     12/2/16    RLS (restless legs syndrome)     Schatzki's ring     dilation dr Flora Rodney for glaucoma 2016    Anurag Situ note rec'd (annual eye exam)    Shingles 2012    Sleep apnea     uses CPAP- 17 f/u note [facility] note/sleep study 17      Past Surgical History:   Procedure Laterality Date    ABDOMEN SURGERY PROC UNLISTED      left inq hernia    CARDIAC SURG PROCEDURE UNLIST  2017    Pacemaker    ENDOSCOPY, COLON, DIAGNOSTIC       dr Monique Shankar repeat in 5 yrs    HX COLONOSCOPY  [id number]    brady- ileocecal valve polyp 5 yr repeat    HX HEENT  Summer 2017    Right ear basal cell carcinoma    HX HERNIA REPAIR  2018    Open left inguinal hernia repair with mesh.  HX PACEMAKER  2017    Dr. Irma Robertson      3 teeth extraction. No Known Allergies   Family History   Problem Relation Age of Onset   [address] [facility] Father       from colon cancer    negative for cardiac disease  Social History     Social History    Marital status:      Spouse name: N/A    Number of children: N/A    Years of education: N/A     Social History Main Topics    Smoking status: Former Smoker    Smokeless tobacco: Former User     Quit date: 1985    Alcohol use No    Drug use: No    Sexual activity: Yes     Birth control/ protection: Spermicide     Other Topics Concern    None     Social History Narrative     Current Outpatient Prescriptions   Medication Sig    omeprazole (PRILOSEC) 20 mg capsule TAKE 1 CAPSULES BY MOUTH DAILY. TO REDUCE STOMACH ACID    sildenafil citrate (VIAGRA) 100 mg tablet Take 100 mg by mouth as needed.  nitroglycerin (NITROLINGUAL) 400 mcg/spray spray 1 Spray by SubLINGual route every five (5) minutes as needed for Chest Pain.  Azelastine (ASTEPRO) 0.15 % (205.5 mcg) nasal spray two (2) times a day.  melatonin 3 mg tablet Take 1 Tab by mouth nightly.     aspirin delayed-release 81 mg tablet Take  by mouth daily.  pravastatin (PRAVACHOL) 40 mg tablet TAKE 1 TABLET EVERY DAY IN THE NIGHT TO HELP LOWER CHOLESTEROL    ketoconazole (NIZORAL) 2 % topical cream     albuterol (PROVENTIL VENTOLIN) 2.5 mg /3 mL (0.083 %) nebulizer solution 3 mL by Nebulization route every four (4) hours as needed for Wheezing or Shortness of Breath.  ALBUTEROL SULFATE (PROAIR HFA IN) Take 2 puffs by inhalation four (4) times daily as needed.  cholecalciferol, vitamin d3, (VITAMIN D) 1,000 unit tablet Take  by mouth daily.  ADVAIR DISKUS 100-50 mcg/Dose diskus inhaler Take 1 puff by inhalation two (2) times a day.  hydrocortisone (WESTCORT) 0.2 % topical cream     loratadine (CLARITIN) 10 mg tablet Take 10 mg by mouth daily.  coenzyme q10 30 mg capsule Take 30 mg by mouth three (3) times daily.  MULTIVITAMINS (MULTIPLE VITAMIN PO) Take  by mouth daily.  azelastine (ASTELIN) 137 mcg nasal spray 1 Spray by Nasal route two (2) times a day. Use in each nostril as directed     GLUC HCL/CSANA/GLY-AM-GLY,MX/C (COSAMIN DS PO) Take  by mouth daily. No current facility-administered medications for this visit. Vitals:    10/09/18 1145 10/09/18 1149 10/09/18 1150   BP: 116/70 118/70 116/70   Pulse: 66     Resp: 18     Weight: 190 lb (86.2 kg)     Height: 5' 8.5\" (1.74 m)         I have reviewed the nurses notes, vitals, problem list, allergy list, medical history, family, social history and medications. Review of Symptoms:    General: Pt denies excessive weight gain or loss. Pt is able to conduct ADL's  HEENT: +dizziness, Denies blurred vision, headaches, epistaxis and difficulty swallowing. Respiratory: Denies shortness of breath, CORRIGAN, wheezing or stridor.   Cardiovascular: Denies precordial pain, palpitations, edema or PND  Gastrointestinal: Denies poor appetite, indigestion, abdominal pain or blood in stool  Urinary: Denies dysuria, pyuria  Musculoskeletal: Denies pain or swelling from muscles or joints  Neurologic: Denies tremor, paresthesias, or sensory motor disturbance  Skin: Denies rash, itching or texture change. Psych: Denies depression      Physical Exam:      General: Well developed, in no acute distress. HEENT: Eyes - PERRL, no jvd  Heart:  Normal S1/S2 negative S3 or S4. Regular, no murmur, gallop or rub.   Respiratory: Clear bilaterally x 4, no wheezing or rales  Abdomen:   Soft, non-tender, bowel sounds are active.   Extremities:  No edema, normal cap refill, no cyanosis. Musculoskeletal: No clubbing  Neuro: A&Ox3, speech clear, gait stable. Skin: Skin color is normal. No rashes or lesions.  Non diaphoretic  Vascular: 2+ pulses symmetric in all extremities    Cardiographics    Ekg: dual chamber pacing    Results for orders placed or performed during the hospital encounter of 07/15/18   EKG, 12 LEAD, INITIAL   Result Value Ref Range    Ventricular Rate 81 BPM    Atrial Rate 81 BPM    P-R Interval 234 ms    QRS Duration 90 ms    Q-T Interval 388 ms    QTC Calculation (Bezet) 450 ms    Calculated P Axis 77 degrees    Calculated R Axis -40 degrees    Calculated T Axis -61 degrees    Diagnosis       Sinus rhythm with 1st degree AV block  Left axis deviation  Minimal voltage criteria for LVH, may be normal variant  Anterior infarct , age undetermined  ST & T wave abnormality, consider inferolateral ischemia  No previous ECGs available  Confirmed by Hero Garzon ([id number]) on 7/16/2018 4:03:56 PM     Results for orders placed or performed in visit on 05/19/17   CARDIAC HOLTER MONITOR, 24 HOURS    Narrative    ECG Monitor/24 hours, Complete    Reason for Holter Monitor   SYNCOPE    Heartbeat    Slowest 31  Average 53  Fastest  87        Results:   Underlying Rhythm: Sinus bradycardia      Atrial Arrhythmias: premature atrial contractions; occasional and severe bradycardia            AV Conduction: normal    Ventricular Arrhythmias: premature ventricular contractions; occasional     ST Segment Analysis:normal     Symptom Correlation:  None reported    Comment:   Sinus bradycardia with profound bradycardia to the 20s/30s. Clinical correlation advised. Daron Almanazr MD, [facility]            Lab Results   Component Value Date/Time    WBC 10.3 07/18/2018 06:20 AM    HGB 15.3 07/18/2018 06:20 AM    HCT 45.4 07/18/2018 06:20 AM    PLATELET 971 95/35/8214 06:20 AM    .4 (H) 07/18/2018 06:20 AM      Lab Results   Component Value Date/Time    Sodium 138 07/18/2018 06:20 AM    Potassium 4.5 07/18/2018 06:20 AM    Chloride 106 07/18/2018 06:20 AM    CO2 26 07/18/2018 06:20 AM    Anion gap 6 07/18/2018 06:20 AM    Glucose 96 07/18/2018 06:20 AM    BUN 17 07/18/2018 06:20 AM    Creatinine 1.31 (H) 07/18/2018 06:20 AM    BUN/Creatinine ratio 13 07/18/2018 06:20 AM    GFR est AA >60 07/18/2018 06:20 AM    GFR est non-AA 53 (L) 07/18/2018 06:20 AM    Calcium 8.6 07/18/2018 06:20 AM    Bilirubin, total 0.6 07/15/2018 09:49 PM    AST (SGOT) 19 07/15/2018 09:49 PM    Alk. phosphatase 93 07/15/2018 09:49 PM    Protein, total 7.0 07/15/2018 09:49 PM    Albumin 3.7 07/15/2018 09:49 PM    Globulin 3.3 07/15/2018 09:49 PM    A-G Ratio 1.1 07/15/2018 09:49 PM    ALT (SGPT) 23 07/15/2018 09:49 PM         Assessment:     Assessment:        ICD-10-CM ICD-9-CM    1. SSS (sick sinus syndrome) (HCC) I49.5 427.81 AMB POC EKG ROUTINE W/ 12 LEADS, INTER & REP   2. CHB (complete heart block) (HCC) I44.2 426.0    3. Essential hypertension I10 401.9    4. Mixed hyperlipidemia E78.2 272.2      Orders Placed This Encounter    AMB POC EKG ROUTINE W/ 12 LEADS, INTER & REP     Order Specific Question:   Reason for Exam:     Answer:   routine        Plan:   Mr. Anival Borrego is here for device follow up. He is doing well and denies cardiac complaints. He reports occasional dizziness especially when bending over to pick something up. Per his daughter who is present today, he is not well hydrated.  Device interrogation demonstrates normal functioning with 70% AP and 60% RVP. Previous echocardiogram demonstrates preserved LV function. Continue current medical therapy and follow up in one year. Continue medical management for HTN,hyperlipidemia. Thank you for allowing me to participate in Carl Michael 's care. Daniel Meek NP    Patient seen and examined by me with nurse practitioner. I personally performed all components of the history, physical, and medical decision making and agree with the assessment and plan with minor modifications as noted. A paced 70% for sick sinus and V paced 60% for high grade av block. Cont med rx for htn. F/u in one year.     Penny Urrutia MD, [facility]

## 2018-10-09 NOTE — PROGRESS NOTES
1. Have you been to the ER, urgent care clinic since your last visit? Hospitalized since your last visit? [id number] admitted summer 2018, hernia surgery. 2. Have you seen or consulted any other health care providers outside of the [address] [place] [address] since your last visit? Include any pap smears or colon screening. No     Chief Complaint   Patient presents with    Pacemaker Check     Yearly appt. C/O dizziness.

## 2018-10-09 NOTE — TELEPHONE ENCOUNTER
Spoke with patient  Verified patient with 2 patient identifiers    Patient request speak with daughter Dianna Hill reference pravastatin refill. Patient states she handles his medications.

## 2018-10-12 NOTE — TELEPHONE ENCOUNTER
Spoke with patient's daughter Anne Marie Medel  Verified patient with 2 patient identifiers    [id number] says she spoke with PCP and they will order labs and refill Pravastatin.

## 2019-01-30 ENCOUNTER — TELEPHONE (OUTPATIENT)
Dept: FAMILY MEDICINE CLINIC | Age: 79
End: 2019-01-30

## 2019-01-30 NOTE — TELEPHONE ENCOUNTER
Patient's daughter Rashad Suárez called stating that he need a script for physical therapy.    P: [phone]

## 2019-01-31 NOTE — TELEPHONE ENCOUNTER
Called patient to schedule an appointment in regards to physical therapy. Per provider patient needs to schedule an appointment to discuss what is going on. Patient did not answer. Left a voicemail to return a call back to the office.

## 2019-02-05 LAB — CREATININE, EXTERNAL: 1.32

## 2019-02-20 ENCOUNTER — TELEPHONE (OUTPATIENT)
Dept: FAMILY MEDICINE CLINIC | Age: 79
End: 2019-02-20

## 2019-02-20 NOTE — TELEPHONE ENCOUNTER
Writer called patient regarding information he needed. Someone on other end picked up the phone then preceded to hang up. Writer will try calling back at another time.

## 2019-02-20 NOTE — TELEPHONE ENCOUNTER
----- Message from Deanna Green sent at 2/20/2019 10:27 AM EST -----  Regarding: /Telephone  Pt requesting a call back regarding Laura Rincon information due to hernia concerns. Best contact:[phone] (leave a voicemail)

## 2019-02-20 NOTE — TELEPHONE ENCOUNTER
Called patient to gather more information of what was needed. I do not see any referrals or recent conversations regarding this issue. PSR is not aware of what the patient needs or is asking for. Patient did not answer. Left a voicemail to return a call back to the office.

## 2019-02-21 NOTE — TELEPHONE ENCOUNTER
Writer called patient to see if he was speaking about Dr. Carola Baez. Patient did not answer. Writer left  requesting phone call back.

## 2019-04-16 DIAGNOSIS — I10 ESSENTIAL HYPERTENSION: ICD-10-CM

## 2019-04-16 DIAGNOSIS — N18.2 STAGE 2 CHRONIC KIDNEY DISEASE: ICD-10-CM

## 2019-04-16 DIAGNOSIS — R31.9 HEMATURIA, UNSPECIFIED TYPE: ICD-10-CM

## 2019-04-16 DIAGNOSIS — E55.9 VITAMIN D DEFICIENCY: ICD-10-CM

## 2019-04-16 DIAGNOSIS — E78.00 HYPERCHOLESTEROLEMIA: ICD-10-CM

## 2019-04-16 DIAGNOSIS — I25.119 CORONARY ARTERY DISEASE INVOLVING NATIVE CORONARY ARTERY OF NATIVE HEART WITH ANGINA PECTORIS (HCC): Primary | ICD-10-CM

## 2019-04-18 ENCOUNTER — OFFICE VISIT (OUTPATIENT)
Dept: CARDIOLOGY CLINIC | Age: 79
End: 2019-04-18

## 2019-04-18 ENCOUNTER — CLINICAL SUPPORT (OUTPATIENT)
Dept: CARDIOLOGY CLINIC | Age: 79
End: 2019-04-18

## 2019-04-18 VITALS
RESPIRATION RATE: 16 BRPM | DIASTOLIC BLOOD PRESSURE: 74 MMHG | HEIGHT: 69 IN | SYSTOLIC BLOOD PRESSURE: 118 MMHG | WEIGHT: 184.6 LBS | HEART RATE: 62 BPM | OXYGEN SATURATION: 99 % | BODY MASS INDEX: 27.34 KG/M2

## 2019-04-18 DIAGNOSIS — Z95.0 CARDIAC PACEMAKER IN SITU: Primary | ICD-10-CM

## 2019-04-18 DIAGNOSIS — I44.2 CHB (COMPLETE HEART BLOCK) (HCC): ICD-10-CM

## 2019-04-18 DIAGNOSIS — I49.5 SSS (SICK SINUS SYNDROME) (HCC): Primary | ICD-10-CM

## 2019-04-18 DIAGNOSIS — I10 ESSENTIAL HYPERTENSION: ICD-10-CM

## 2019-04-18 DIAGNOSIS — I25.10 CORONARY ARTERY DISEASE INVOLVING NATIVE HEART WITHOUT ANGINA PECTORIS, UNSPECIFIED VESSEL OR LESION TYPE: ICD-10-CM

## 2019-04-18 DIAGNOSIS — I49.5 SSS (SICK SINUS SYNDROME) (HCC): ICD-10-CM

## 2019-04-18 DIAGNOSIS — Z95.0 PACEMAKER: ICD-10-CM

## 2019-04-18 DIAGNOSIS — I47.20 VT (VENTRICULAR TACHYCARDIA): ICD-10-CM

## 2019-04-18 RX ORDER — DICLOFENAC SODIUM 10 MG/G
GEL TOPICAL
COMMUNITY
Start: 2019-02-05 | End: 2019-04-18

## 2019-04-18 RX ORDER — METHYLPREDNISOLONE 4 MG/1
TABLET ORAL
COMMUNITY
Start: 2019-03-21 | End: 2019-04-18

## 2019-04-18 RX ORDER — LIDOCAINE 50 MG/G
1 PATCH TOPICAL
COMMUNITY
Start: 2019-02-14 | End: 2019-04-18

## 2019-04-18 NOTE — PROGRESS NOTES
1. Have you been to the ER, urgent care clinic since your last visit? Hospitalized since your last visit? 2. Have you seen or consulted any other health care providers outside of the [address] [place] [address] since your last visit? Include any pap smears or colon screening.

## 2019-04-18 NOTE — PROGRESS NOTES
Subjective:      Shruthi Dobson is a 78 y.o. male is here for annual device follow up. He has had a decrease in energy level. The patient denies chest pain/ shortness of breath, orthopnea, PND, LE edema, palpitations, syncope, presyncope.        Patient Active Problem List    Diagnosis Date Noted    Status post bilateral inguinal hernia repair 08/14/2018    SSS (sick sinus syndrome) ([facility] 05/25/2017    Hematuria 08/26/2016    Hypertension 05/06/2016    [facility] 01/28/2015    CAD (coronary artery disease)     Hypercholesterolemia     ED (erectile dysfunction) 10/31/2013    Family history of colon cancer 09/25/2013    Colon polyp 09/25/2013    Diverticulosis 09/25/2013    CKD (chronic kidney disease) 01/31/2012    Vitamin D deficiency 07/21/2011    BPH (benign prostatic hypertrophy) with urinary obstruction 07/21/2011    Acne rosacea 07/21/2011    COPD (chronic obstructive pulmonary disease) ([facility] 07/13/2010    GERD (gastroesophageal reflux disease) 07/13/2010      Abdirahman Malhotra MD  Past Medical History:   Diagnosis Date    Arthritis     parris knee    Asthma     dr Gabino Jacobo 5/18/17    Basal cell carcinoma of skin 05/10/2017    right helix Jose Davidclotilde Baez's note rec'd    CAD (coronary artery disease)     dr Mary Anne Maria CKD (chronic kidney disease), stage III ([facility] 05/2014    [facility] notes 8/8/17 note and lab    Colon polyps 09/25/13    also dad with colon cancer    Contact dermatitis and other eczema, due to unspecified cause     eczema dr Shilpa Smith    COPD     dr Laila Schmitt chronic bronchitis     5/18/17    Elevated serum creatinine     Fall 11 & 12/2012    Right knee gave away and tripped in his house    GERD (gastroesophageal reflux disease)     dr Marion Orozco (hard of hearing)     Hypercholesterolemia     ILD (interstitial lung disease) ([facility] 07/15/2014    Dr Júnior Manley's   pulmonary fibrosis 5/18/17 f/u note    Proteinuria 10/2012    abou assi     16    RLS (restless legs syndrome)     Schatzki's ring     dilation dr Robert Mann for glaucoma 2016    Viktoria Almazan note rec'd (annual eye exam)    Shingles 2012    Sleep apnea     uses CPAP- 17 f/u note [facility] note/sleep study 17      Past Surgical History:   Procedure Laterality Date    ABDOMEN SURGERY PROC UNLISTED      left inq hernia    CARDIAC SURG PROCEDURE UNLIST  2017    Pacemaker    ENDOSCOPY, COLON, DIAGNOSTIC       dr Shannon Wilkins repeat in 5 yrs    HX COLONOSCOPY  [id number]    brady- ileocecal valve polyp 5 yr repeat    HX HEENT  Summer 2017    Right ear basal cell carcinoma    HX HERNIA REPAIR  2018    Open left inguinal hernia repair with mesh.  HX PACEMAKER  2017    Dr. Adriel Ware      3 teeth extraction. No Known Allergies   Family History   Problem Relation Age of Onset   [facility] Father          from colon cancer    negative for cardiac disease  Social History     Socioeconomic History    Marital status:      Spouse name: Not on file    Number of children: Not on file    Years of education: Not on file    Highest education level: Not on file   Tobacco Use    Smoking status: Former Smoker    Smokeless tobacco: Former User     Quit date: 1985   Substance and Sexual Activity    Alcohol use: No    Drug use: No    Sexual activity: Yes     Birth control/protection: Spermicide     Current Outpatient Medications   Medication Sig    ADVAIR DISKUS 100-50 mcg/dose diskus inhaler INHALE 1 PUFF TWO TIMES DAILY THEN RINSE MOUTH WELL AFTER    pravastatin (PRAVACHOL) 40 mg tablet TAKE 1 TABLET EVERY EVENING TO HELP LOWER CHOLESTEROL    omeprazole (PRILOSEC) 20 mg capsule TAKE 1 CAPSULES BY MOUTH DAILY. TO REDUCE STOMACH ACID    aspirin delayed-release 81 mg tablet Take  by mouth daily.     ketoconazole (NIZORAL) 2 % topical cream  albuterol (PROVENTIL VENTOLIN) 2.5 mg /3 mL (0.083 %) nebulizer solution 3 mL by Nebulization route every four (4) hours as needed for Wheezing or Shortness of Breath.  ALBUTEROL SULFATE (PROAIR HFA IN) Take 2 puffs by inhalation four (4) times daily as needed.  cholecalciferol, vitamin d3, (VITAMIN D) 1,000 unit tablet Take  by mouth daily.  hydrocortisone (WESTCORT) 0.2 % topical cream     loratadine (CLARITIN) 10 mg tablet Take 10 mg by mouth daily.  coenzyme q10 30 mg capsule Take 30 mg by mouth three (3) times daily.  azelastine (ASTELIN) 137 mcg nasal spray 1 Spray by Nasal route two (2) times a day. Use in each nostril as directed      No current facility-administered medications for this visit. Vitals:    04/18/19 1431   BP: 118/74   Pulse: 62   Resp: 16   SpO2: 99%   Weight: 184 lb 9.6 oz (83.7 kg)   Height: 5' 8.5\" (1.74 m)       I have reviewed the nurses notes, vitals, problem list, allergy list, medical history, family, social history and medications. Review of Symptoms:    General: Pt denies excessive weight gain or loss. Pt is able to conduct ADL's  HEENT: Denies blurred vision, headaches, epistaxis and difficulty swallowing. Respiratory: Denies shortness of breath, CORRIGAN, wheezing or stridor. Cardiovascular: Denies precordial pain, palpitations, edema or PND  Gastrointestinal: Denies poor appetite, indigestion, abdominal pain or blood in stool  Urinary: Denies dysuria, pyuria  Musculoskeletal: Denies pain or swelling from muscles or joints  Neurologic: Denies tremor, paresthesias, or sensory motor disturbance  Skin: Denies rash, itching or texture change. Psych: Denies depression      Physical Exam:      General: Well developed, in no acute distress. HEENT: Eyes - PERRL, no jvd  Heart:  Normal S1/S2 negative S3 or S4.  Regular, no murmur, gallop or rub.   Respiratory: Clear bilaterally x 4, no wheezing or rales  Abdomen:   Soft, non-tender, bowel sounds are active.   Extremities:  No edema, normal cap refill, no cyanosis. Musculoskeletal: No clubbing  Neuro: A&Ox3, speech clear, gait stable. Skin: Skin color is normal. No rashes or lesions. Non diaphoretic  Vascular: 2+ pulses symmetric in all extremities    Cardiographics    Ekg: AV pacing    Results for orders placed or performed during the hospital encounter of 07/15/18   EKG, 12 LEAD, INITIAL   Result Value Ref Range    Ventricular Rate 81 BPM    Atrial Rate 81 BPM    P-R Interval 234 ms    QRS Duration 90 ms    Q-T Interval 388 ms    QTC Calculation (Bezet) 450 ms    Calculated P Axis 77 degrees    Calculated R Axis -40 degrees    Calculated T Axis -61 degrees    Diagnosis       Sinus rhythm with 1st degree AV block  Left axis deviation  Minimal voltage criteria for LVH, may be normal variant  Anterior infarct , age undetermined  ST & T wave abnormality, consider inferolateral ischemia  No previous ECGs available  Confirmed by Jai Tesfaye ([id number]) on 7/16/2018 4:03:56 PM     Results for orders placed or performed in visit on 05/19/17   CARDIAC HOLTER MONITOR, 24 HOURS    Narrative    ECG Monitor/24 hours, Complete    Reason for Holter Monitor   SYNCOPE    Heartbeat    Slowest 31  Average 53  Fastest  87        Results:   Underlying Rhythm: Sinus bradycardia      Atrial Arrhythmias: premature atrial contractions; occasional and severe bradycardia            AV Conduction: normal    Ventricular Arrhythmias: premature ventricular contractions; occasional     ST Segment Analysis:normal     Symptom Correlation:  None reported    Comment:   Sinus bradycardia with profound bradycardia to the 20s/30s. Clinical correlation advised.      Belkys Thomas MD, Letitia Diego, [facility]            Lab Results   Component Value Date/Time    WBC 10.3 07/18/2018 06:20 AM    HGB 15.3 07/18/2018 06:20 AM    HCT 45.4 07/18/2018 06:20 AM    PLATELET 829 94/80/8269 06:20 AM    .4 (H) 07/18/2018 06:20 AM      Lab Results   Component Value Date/Time    Sodium 138 07/18/2018 06:20 AM    Potassium 4.5 07/18/2018 06:20 AM    Chloride 106 07/18/2018 06:20 AM    CO2 26 07/18/2018 06:20 AM    Anion gap 6 07/18/2018 06:20 AM    Glucose 96 07/18/2018 06:20 AM    BUN 17 07/18/2018 06:20 AM    Creatinine 1.31 (H) 07/18/2018 06:20 AM    BUN/Creatinine ratio 13 07/18/2018 06:20 AM    GFR est AA >60 07/18/2018 06:20 AM    GFR est non-AA 53 (L) 07/18/2018 06:20 AM    Calcium 8.6 07/18/2018 06:20 AM    Bilirubin, total 0.6 07/15/2018 09:49 PM    AST (SGOT) 19 07/15/2018 09:49 PM    Alk. phosphatase 93 07/15/2018 09:49 PM    Protein, total 7.0 07/15/2018 09:49 PM    Albumin 3.7 07/15/2018 09:49 PM    Globulin 3.3 07/15/2018 09:49 PM    A-G Ratio 1.1 07/15/2018 09:49 PM    ALT (SGPT) 23 07/15/2018 09:49 PM         Assessment:     Assessment:        ICD-10-CM ICD-9-CM    1. SSS (sick sinus syndrome) ([facility]) I49.5 427.81 AMB POC EKG ROUTINE W/ 12 LEADS, INTER & REP      ECHO ADULT COMPLETE      NUCLEAR CARDIAC STRESS TEST   2. CHB (complete heart block) ([facility]) I44.2 426.0    3. Essential hypertension I10 401.9    4. Pacemaker Z95.0 V45.01    5. Coronary artery disease involving native heart without angina pectoris, unspecified vessel or lesion type I25.10 414.01    6. VT (ventricular tachycardia) ([facility]) I47.2 427.1      Orders Placed This Encounter    AMB POC EKG ROUTINE W/ 12 LEADS, INTER & REP     Order Specific Question:   Reason for Exam:     Answer:   Routine    DISCONTD: methylPREDNISolone (MEDROL DOSEPACK) 4 mg tablet    DISCONTD: diclofenac (VOLTAREN) 1 % gel    DISCONTD: lidocaine (LIDODERM) 5 %     Sig: Apply 1 Patch to affected area. Plan:   Mr. Yohannes Jensen is here for annual device follow up. He is doing well and denies cardiac complaints. He has had low blood pressures with exercise during PT. He is not well hydrated. Device interrogation demonstrates normal functioning with 79% AP and 58% RVP. He had a 15 second episode of VT in January.  Will update echocardiogram and stress testing. Follow up if abnormal, otherwise one year. Continue medical management for CAD, VT, HTN. Thank you for allowing me to participate in Denise Sosa 's care.       Donaldo Puentes NP

## 2019-12-05 ENCOUNTER — CLINICAL SUPPORT (OUTPATIENT)
Dept: CARDIOLOGY CLINIC | Age: 79
End: 2019-12-05

## 2019-12-05 ENCOUNTER — OFFICE VISIT (OUTPATIENT)
Dept: CARDIOLOGY CLINIC | Age: 79
End: 2019-12-05

## 2019-12-05 VITALS
RESPIRATION RATE: 18 BRPM | DIASTOLIC BLOOD PRESSURE: 72 MMHG | OXYGEN SATURATION: 99 % | HEIGHT: 69 IN | HEART RATE: 59 BPM | WEIGHT: 183 LBS | SYSTOLIC BLOOD PRESSURE: 146 MMHG | BODY MASS INDEX: 27.11 KG/M2

## 2019-12-05 DIAGNOSIS — I10 ESSENTIAL HYPERTENSION: ICD-10-CM

## 2019-12-05 DIAGNOSIS — E78.2 MIXED HYPERLIPIDEMIA: ICD-10-CM

## 2019-12-05 DIAGNOSIS — Z95.0 CARDIAC PACEMAKER IN SITU: ICD-10-CM

## 2019-12-05 DIAGNOSIS — I47.20 VT (VENTRICULAR TACHYCARDIA): ICD-10-CM

## 2019-12-05 DIAGNOSIS — I49.5 SSS (SICK SINUS SYNDROME) (HCC): Primary | ICD-10-CM

## 2019-12-05 DIAGNOSIS — I25.10 CORONARY ARTERY DISEASE INVOLVING NATIVE HEART WITHOUT ANGINA PECTORIS, UNSPECIFIED VESSEL OR LESION TYPE: ICD-10-CM

## 2019-12-05 DIAGNOSIS — I44.2 CHB (COMPLETE HEART BLOCK) (HCC): ICD-10-CM

## 2019-12-05 DIAGNOSIS — Z95.0 CARDIAC PACEMAKER IN SITU: Primary | ICD-10-CM

## 2019-12-05 NOTE — PROGRESS NOTES
Verified patient with 2 identifiers Reviewed record in preparation for visit and obtained necessary documentation. Chief Complaint Patient presents with  Coronary Artery Disease  Pacemaker Check Follow up  Dizziness  
  at times 1. Have you been to the ER, urgent care clinic since your last visit? Hospitalized since your last visit? Yes urgent Care [address] 
 
2. Have you seen or consulted any other health care providers outside of the [address] [place] [address] since your last visit? Include any pap smears or colon screening. Yes PT Home care for rotator cuff injury

## 2019-12-05 NOTE — PROGRESS NOTES
Subjective:  
  
Anat Middleton is a 78 y.o. male is here for EP follow up. The patient denies chest pain/ shortness of breath, orthopnea, PND, LE edema, palpitations, syncope, presyncope or fatigue. Some positional dizziness, does not consume water. Patient Active Problem List  
 Diagnosis Date Noted  Status post bilateral inguinal hernia repair 08/14/2018  SSS (sick sinus syndrome) ([facility] 05/25/2017  Hematuria 08/26/2016  Hypertension 05/06/2016  Idiopathic pulmonary fibrosis ([facility] [facility] 01/28/2015  CAD (coronary artery disease)  Hypercholesterolemia  ED (erectile dysfunction) 10/31/2013  Family history of colon cancer 09/25/2013  Colon polyp 09/25/2013  Diverticulosis 09/25/2013  CKD (chronic kidney disease) 01/31/2012  Vitamin D deficiency 07/21/2011  BPH (benign prostatic hypertrophy) with urinary obstruction 07/21/2011  Acne rosacea 07/21/2011  COPD (chronic obstructive pulmonary disease) ([facility] 07/13/2010  GERD (gastroesophageal reflux disease) 07/13/2010 Maria Hickey MD 
Past Medical History:  
Diagnosis Date  Arthritis   
 parris knee  Asthma   
 dr Simran Leal 5/18/17  Basal cell carcinoma of skin 05/10/2017  
 right helix Jose David Baez's note rec'd  CAD (coronary artery disease)   
 dr Juan Agosto  CKD (chronic kidney disease), stage III ([facility] [address] 05/2014 Wendi Hollow notes 8/8/17 note and lab  Colon polyps 09/25/13  
 also dad with colon cancer  Contact dermatitis and other eczema, due to unspecified cause   
 eczema dr Becca Gentile  COPD   
 dr Lida Ledesma chronic bronchitis     5/18/17  Elevated serum creatinine  Fall 11 & 12/2012 Right knee gave away and tripped in his house  GERD (gastroesophageal reflux disease)   
 dr Donta Rojas (hard of hearing)  Hypercholesterolemia  ILD (interstitial lung disease) ([facility] [address] 07/15/2014 Dr Júnior Manley's   pulmonary fibrosis 5/18/17 f/u note  Proteinuria 10/2012  
 abou assi     16  RLS (restless legs syndrome)  Schatzki's ring   
 dilation dr Safia Block  Screening for glaucoma 2016 Dmitri Patel note rec'd (annual eye exam)  Shingles 2012  Sleep apnea   
 uses CPAP- 17 f/u note [facility] note/sleep study 17 Past Surgical History:  
Procedure Laterality Date  ABDOMEN SURGERY PROC UNLISTED    
 left inq hernia  CARDIAC SURG PROCEDURE UNLIST  2017 Pacemaker  ENDOSCOPY, COLON, DIAGNOSTIC    
  dr Merced Pardo repeat in 5 yrs  HX COLONOSCOPY  [id number]  
 brady- ileocecal valve polyp 5 yr repeat Orin SIMS  Summer 2017 Right ear basal cell carcinoma  HX HERNIA REPAIR  2018 Open left inguinal hernia repair with mesh.  HX PACEMAKER  2017 Dr. Stephane Nair    
 3 teeth extraction. No Known Allergies Family History Problem Relation Age of Onset  Cancer Father   
      from colon cancer  
 negative for cardiac disease Social History Socioeconomic History  Marital status:  Spouse name: Not on file  Number of children: Not on file  Years of education: Not on file  Highest education level: Not on file Tobacco Use  Smoking status: Former Smoker  Smokeless tobacco: Former User Quit date: 1985 Substance and Sexual Activity  Alcohol use: No  
 Drug use: No  
 Sexual activity: Yes Birth control/protection: Spermicide Current Outpatient Medications Medication Sig  ADVAIR DISKUS 100-50 mcg/dose diskus inhaler INHALE 1 PUFF TWO TIMES DAILY THEN RINSE MOUTH WELL AFTER  
 omeprazole (PRILOSEC) 20 mg capsule TAKE 1 CAPSULES BY MOUTH DAILY. TO REDUCE STOMACH ACID  ketoconazole (NIZORAL) 2 % topical cream   
 hydrocortisone (WESTCORT) 0.2 % topical cream   
  pravastatin (PRAVACHOL) 40 mg tablet TAKE 1 TABLET EVERY EVENING TO HELP LOWER CHOLESTEROL  aspirin delayed-release 81 mg tablet Take  by mouth daily.  albuterol (PROVENTIL VENTOLIN) 2.5 mg /3 mL (0.083 %) nebulizer solution 3 mL by Nebulization route every four (4) hours as needed for Wheezing or Shortness of Breath.  ALBUTEROL SULFATE (PROAIR HFA IN) Take 2 puffs by inhalation four (4) times daily as needed.  cholecalciferol, vitamin d3, (VITAMIN D) 1,000 unit tablet Take  by mouth daily.  loratadine (CLARITIN) 10 mg tablet Take 10 mg by mouth daily.  coenzyme q10 30 mg capsule Take 30 mg by mouth three (3) times daily.  azelastine (ASTELIN) 137 mcg nasal spray 1 Spray by Nasal route two (2) times a day. Use in each nostril as directed No current facility-administered medications for this visit. Vitals:  
 12/05/19 1337 BP: 146/72 Pulse: (!) 59 Resp: 18 SpO2: 99% Weight: 183 lb (83 kg) Height: 5' 8.5\" (1.74 m) I have reviewed the nurses notes, vitals, problem list, allergy list, medical history, family, social history and medications. Review of Symptoms: 
 
General: Pt denies excessive weight gain or loss. Pt is able to conduct ADL's HEENT: Denies blurred vision, headaches, epistaxis and difficulty swallowing. Respiratory: Denies shortness of breath, CORRIGAN, wheezing or stridor. Cardiovascular: Denies precordial pain, palpitations, edema or PND Gastrointestinal: Denies poor appetite, indigestion, abdominal pain or blood in stool Urinary: Denies dysuria, pyuria Musculoskeletal: Denies pain or swelling from muscles or joints Neurologic: Denies tremor, paresthesias, or sensory motor disturbance Skin: Denies rash, itching or texture change. Psych: Denies depression Physical Exam:   
 
General: Well developed, in no acute distress. HEENT: Eyes - PERRL, no jvd Heart:  Normal S1/S2 negative S3 or S4. Regular, no murmur, gallop or rub. Respiratory: Clear bilaterally x 4, no wheezing or rales Extremities:  No edema, normal cap refill, no cyanosis. Musculoskeletal: No clubbing Neuro: A&Ox3, speech clear, gait stable. Skin: Skin color is normal. No rashes or lesions. Non diaphoretic Vascular: 2+ pulses symmetric in all extremities Cardiographics Ekg: AV paced Results for orders placed or performed during the hospital encounter of 07/15/18 EKG, 12 LEAD, INITIAL Result Value Ref Range Ventricular Rate 81 BPM  
 Atrial Rate 81 BPM  
 P-R Interval 234 ms QRS Duration 90 ms Q-T Interval 388 ms QTC Calculation (Bezet) 450 ms Calculated P Axis 77 degrees Calculated R Axis -40 degrees Calculated T Axis -61 degrees Diagnosis Sinus rhythm with 1st degree AV block Left axis deviation Minimal voltage criteria for LVH, may be normal variant Anterior infarct , age undetermined ST & T wave abnormality, consider inferolateral ischemia No previous ECGs available Confirmed by Ekta Delgado ([id number]) on 7/16/2018 4:03:56 PM 
  
Results for orders placed or performed in visit on 05/19/17 CARDIAC HOLTER MONITOR, 24 HOURS Narrative ECG Monitor/24 hours, Complete Reason for Holter Monitor   SYNCOPE Heartbeat Slowest 31 Average 53 Fastest  87 Results:  
Underlying Rhythm: Sinus bradycardia Atrial Arrhythmias: premature atrial contractions; occasional and severe bradycardia AV Conduction: normal 
 
Ventricular Arrhythmias: premature ventricular contractions; occasional  
 
ST Segment Analysis:normal  
 
Symptom Correlation: 
None reported Comment:  
Sinus bradycardia with profound bradycardia to the 20s/30s. Clinical correlation advised. Destini Rincon MD, Danielle Robledo Lab Results Component Value Date/Time  WBC 10.3 07/18/2018 06:20 AM  
 HGB 15.3 07/18/2018 06:20 AM  
 HCT 45.4 07/18/2018 06:20 AM  
 PLATELET 617 16/96/7207 06:20 AM  
 .4 (H) 07/18/2018 06:20 AM  
  
Lab Results Component Value Date/Time Sodium 138 07/18/2018 06:20 AM  
 Potassium 4.5 07/18/2018 06:20 AM  
 Chloride 106 07/18/2018 06:20 AM  
 CO2 26 07/18/2018 06:20 AM  
 Anion gap 6 07/18/2018 06:20 AM  
 Glucose 96 07/18/2018 06:20 AM  
 BUN 17 07/18/2018 06:20 AM  
 Creatinine 1.31 (H) 07/18/2018 06:20 AM  
 BUN/Creatinine ratio 13 07/18/2018 06:20 AM  
 GFR est AA >60 07/18/2018 06:20 AM  
 GFR est non-AA 53 (L) 07/18/2018 06:20 AM  
 Calcium 8.6 07/18/2018 06:20 AM  
 Bilirubin, total 0.6 07/15/2018 09:49 PM  
 AST (SGOT) 19 07/15/2018 09:49 PM  
 Alk. phosphatase 93 07/15/2018 09:49 PM  
 Protein, total 7.0 07/15/2018 09:49 PM  
 Albumin 3.7 07/15/2018 09:49 PM  
 Globulin 3.3 07/15/2018 09:49 PM  
 A-G Ratio 1.1 07/15/2018 09:49 PM  
 ALT (SGPT) 23 07/15/2018 09:49 PM  
  
Lab Results Component Value Date/Time TSH 1.650 09/06/2017 10:45 AM  
 
 
 Assessment: ICD-10-CM ICD-9-CM 1. SSS (sick sinus syndrome) ([facility]) I49.5 427.81   
2. Coronary artery disease involving native heart without angina pectoris, unspecified vessel or lesion type I25.10 414.01 AMB POC EKG ROUTINE W/ 12 LEADS, INTER & REP 3. VT (ventricular tachycardia) ([facility]) I47.2 427.1 4. Essential hypertension I10 401.9 5. Mixed hyperlipidemia E78.2 272.2 6. Cardiac pacemaker in situ Z95.0 V45.01 Orders Placed This Encounter  AMB POC EKG ROUTINE W/ 12 LEADS, INTER & REP Order Specific Question:   Reason for Exam: Answer:   ROUTINE Plan:  
 
Mr Alyx Tobin is here for annual follow up and device check. He is 78% AP and 67%  with no events. 12 years remaining. Normotensive, EKG AV paced. Will get echo as he is RV paced. Follow up with remote monitoring and in our office in 1 year. Continue medical management for SSS, CAD and HTN. Thank you for allowing me to participate in Troy Cortez 's care.  
 
 
Jaelyn Chen NP

## 2020-01-01 ENCOUNTER — APPOINTMENT (OUTPATIENT)
Dept: GENERAL RADIOLOGY | Age: 80
End: 2020-01-01
Attending: EMERGENCY MEDICINE
Payer: MEDICARE

## 2020-01-01 ENCOUNTER — APPOINTMENT (OUTPATIENT)
Dept: GENERAL RADIOLOGY | Age: 80
DRG: 177 | End: 2020-01-01
Attending: EMERGENCY MEDICINE
Payer: MEDICARE

## 2020-01-01 ENCOUNTER — HOSPITAL ENCOUNTER (INPATIENT)
Age: 80
LOS: 12 days | DRG: 177 | End: 2021-01-12
Attending: EMERGENCY MEDICINE | Admitting: HOSPITALIST
Payer: MEDICARE

## 2020-01-01 ENCOUNTER — HOSPITAL ENCOUNTER (OUTPATIENT)
Dept: CT IMAGING | Age: 80
Discharge: HOME | End: 2020-07-10
Attending: NURSE PRACTITIONER
Payer: MEDICARE

## 2020-01-01 ENCOUNTER — HOSPITAL ENCOUNTER (EMERGENCY)
Age: 80
Discharge: HOME | End: 2020-12-15
Attending: EMERGENCY MEDICINE
Payer: MEDICARE

## 2020-01-01 ENCOUNTER — APPOINTMENT (OUTPATIENT)
Dept: ULTRASOUND IMAGING | Age: 80
End: 2020-01-01
Attending: EMERGENCY MEDICINE
Payer: MEDICARE

## 2020-01-01 ENCOUNTER — APPOINTMENT (OUTPATIENT)
Dept: CT IMAGING | Age: 80
End: 2020-01-01
Attending: EMERGENCY MEDICINE
Payer: MEDICARE

## 2020-01-01 ENCOUNTER — PATIENT OUTREACH (OUTPATIENT)
Dept: CASE MANAGEMENT | Age: 80
End: 2020-01-01

## 2020-01-01 VITALS
DIASTOLIC BLOOD PRESSURE: 56 MMHG | SYSTOLIC BLOOD PRESSURE: 108 MMHG | HEART RATE: 62 BPM | OXYGEN SATURATION: 97 % | RESPIRATION RATE: 29 BRPM | WEIGHT: 156.31 LBS | BODY MASS INDEX: 23.69 KG/M2 | HEIGHT: 68 IN | TEMPERATURE: 97.3 F

## 2020-01-01 DIAGNOSIS — N18.9 CHRONIC KIDNEY DISEASE, UNSPECIFIED CKD STAGE: ICD-10-CM

## 2020-01-01 DIAGNOSIS — K46.9 HERNIA OF ABDOMINAL CAVITY: ICD-10-CM

## 2020-01-01 DIAGNOSIS — J81.0 ACUTE PULMONARY EDEMA (HCC): ICD-10-CM

## 2020-01-01 DIAGNOSIS — S76.012A HIP STRAIN, LEFT, INITIAL ENCOUNTER: Primary | ICD-10-CM

## 2020-01-01 DIAGNOSIS — J44.9 CHRONIC OBSTRUCTIVE PULMONARY DISEASE, UNSPECIFIED COPD TYPE (HCC): ICD-10-CM

## 2020-01-01 DIAGNOSIS — J45.20 MILD INTERMITTENT ASTHMA WITHOUT COMPLICATION: ICD-10-CM

## 2020-01-01 DIAGNOSIS — U07.1 COVID-19: ICD-10-CM

## 2020-01-01 DIAGNOSIS — E87.1 HYPONATREMIA: ICD-10-CM

## 2020-01-01 DIAGNOSIS — J96.21 ACUTE ON CHRONIC RESPIRATORY FAILURE WITH HYPOXIA (HCC): Primary | ICD-10-CM

## 2020-01-01 LAB
ALBUMIN SERPL-MCNC: 2.8 G/DL (ref 3.5–5)
ALBUMIN SERPL-MCNC: 3 G/DL (ref 3.5–5)
ALBUMIN SERPL-MCNC: 3.2 G/DL (ref 3.5–5)
ALBUMIN/GLOB SERPL: 0.7 {RATIO} (ref 1.1–2.2)
ALBUMIN/GLOB SERPL: 0.8 {RATIO} (ref 1.1–2.2)
ALBUMIN/GLOB SERPL: 0.9 {RATIO} (ref 1.1–2.2)
ALP SERPL-CCNC: 102 U/L (ref 45–117)
ALP SERPL-CCNC: 103 U/L (ref 45–117)
ALP SERPL-CCNC: 110 U/L (ref 45–117)
ALT SERPL-CCNC: 18 U/L (ref 12–78)
ALT SERPL-CCNC: 18 U/L (ref 12–78)
ALT SERPL-CCNC: 20 U/L (ref 12–78)
ANION GAP SERPL CALC-SCNC: 2 MMOL/L (ref 5–15)
ANION GAP SERPL CALC-SCNC: 5 MMOL/L (ref 5–15)
ANION GAP SERPL CALC-SCNC: 8 MMOL/L (ref 5–15)
APPEARANCE UR: CLEAR
ARTERIAL PATENCY WRIST A: YES
AST SERPL-CCNC: 18 U/L (ref 15–37)
AST SERPL-CCNC: 23 U/L (ref 15–37)
AST SERPL-CCNC: 26 U/L (ref 15–37)
ATRIAL RATE: 45 BPM
BACTERIA URNS QL MICRO: NEGATIVE /HPF
BASE DEFICIT BLD-SCNC: 2 MMOL/L
BASOPHILS # BLD: 0 K/UL (ref 0–0.1)
BASOPHILS # BLD: 0 K/UL (ref 0–0.1)
BASOPHILS NFR BLD: 0 % (ref 0–1)
BASOPHILS NFR BLD: 0 % (ref 0–1)
BDY SITE: ABNORMAL
BILIRUB DIRECT SERPL-MCNC: 0.3 MG/DL (ref 0–0.2)
BILIRUB SERPL-MCNC: 0.5 MG/DL (ref 0.2–1)
BILIRUB SERPL-MCNC: 0.7 MG/DL (ref 0.2–1)
BILIRUB SERPL-MCNC: 1 MG/DL (ref 0.2–1)
BILIRUB UR QL: NEGATIVE
BNP SERPL-MCNC: 1159 PG/ML
BUN SERPL-MCNC: 41 MG/DL (ref 6–20)
BUN SERPL-MCNC: 48 MG/DL (ref 6–20)
BUN SERPL-MCNC: 49 MG/DL (ref 6–20)
BUN/CREAT SERPL: 21 (ref 12–20)
BUN/CREAT SERPL: 28 (ref 12–20)
BUN/CREAT SERPL: 28 (ref 12–20)
CA-I BLD-SCNC: 1.19 MMOL/L (ref 1.12–1.32)
CALCIUM SERPL-MCNC: 8 MG/DL (ref 8.5–10.1)
CALCIUM SERPL-MCNC: 8.1 MG/DL (ref 8.5–10.1)
CALCIUM SERPL-MCNC: 8.4 MG/DL (ref 8.5–10.1)
CALCULATED P AXIS, ECG09: 10 DEGREES
CALCULATED R AXIS, ECG10: -53 DEGREES
CALCULATED T AXIS, ECG11: -57 DEGREES
CHLORIDE SERPL-SCNC: 105 MMOL/L (ref 97–108)
CHLORIDE SERPL-SCNC: 107 MMOL/L (ref 97–108)
CHLORIDE SERPL-SCNC: 107 MMOL/L (ref 97–108)
CK MB CFR SERPL CALC: 2.6 % (ref 0–2.5)
CK MB SERPL-MCNC: 2.1 NG/ML (ref 5–25)
CK SERPL-CCNC: 81 U/L (ref 39–308)
CO2 SERPL-SCNC: 24 MMOL/L (ref 21–32)
CO2 SERPL-SCNC: 24 MMOL/L (ref 21–32)
CO2 SERPL-SCNC: 28 MMOL/L (ref 21–32)
COLOR UR: ABNORMAL
COMMENT, HOLDF: NORMAL
COVID-19 RAPID TEST, COVR: DETECTED
CREAT BLD-MCNC: 1.5 MG/DL (ref 0.6–1.3)
CREAT SERPL-MCNC: 1.73 MG/DL (ref 0.7–1.3)
CREAT SERPL-MCNC: 1.75 MG/DL (ref 0.7–1.3)
CREAT SERPL-MCNC: 1.92 MG/DL (ref 0.7–1.3)
CRP SERPL HS-MCNC: >9.5 MG/L
D DIMER PPP FEU-MCNC: 3.37 MG/L FEU (ref 0–0.65)
DIAGNOSIS, 93000: NORMAL
DIFFERENTIAL METHOD BLD: ABNORMAL
DIFFERENTIAL METHOD BLD: ABNORMAL
EOSINOPHIL # BLD: 0.1 K/UL (ref 0–0.4)
EOSINOPHIL # BLD: 0.4 K/UL (ref 0–0.4)
EOSINOPHIL NFR BLD: 1 % (ref 0–7)
EOSINOPHIL NFR BLD: 4 % (ref 0–7)
EPITH CASTS URNS QL MICRO: ABNORMAL /LPF
ERYTHROCYTE [DISTWIDTH] IN BLOOD BY AUTOMATED COUNT: 14.3 % (ref 11.5–14.5)
ERYTHROCYTE [DISTWIDTH] IN BLOOD BY AUTOMATED COUNT: 14.9 % (ref 11.5–14.5)
ERYTHROCYTE [DISTWIDTH] IN BLOOD BY AUTOMATED COUNT: 15.1 % (ref 11.5–14.5)
FERRITIN SERPL-MCNC: 134 NG/ML (ref 26–388)
FIBRINOGEN PPP-MCNC: 445 MG/DL (ref 200–475)
FIBRINOGEN PPP-MCNC: 483 MG/DL (ref 200–475)
FLUAV AG NPH QL IA: NEGATIVE
FLUBV AG NOSE QL IA: NEGATIVE
GAS FLOW.O2 O2 DELIVERY SYS: ABNORMAL L/MIN
GAS FLOW.O2 SETTING OXYMISER: 10 L/M
GLOBULIN SER CALC-MCNC: 3.7 G/DL (ref 2–4)
GLOBULIN SER CALC-MCNC: 3.8 G/DL (ref 2–4)
GLOBULIN SER CALC-MCNC: 3.9 G/DL (ref 2–4)
GLUCOSE BLD STRIP.AUTO-MCNC: 101 MG/DL (ref 65–100)
GLUCOSE SERPL-MCNC: 78 MG/DL (ref 65–100)
GLUCOSE SERPL-MCNC: 82 MG/DL (ref 65–100)
GLUCOSE SERPL-MCNC: 99 MG/DL (ref 65–100)
GLUCOSE UR STRIP.AUTO-MCNC: NEGATIVE MG/DL
HCO3 BLD-SCNC: 23.1 MMOL/L (ref 22–26)
HCT VFR BLD AUTO: 43.3 % (ref 36.6–50.3)
HCT VFR BLD AUTO: 45.1 % (ref 36.6–50.3)
HCT VFR BLD AUTO: 46 % (ref 36.6–50.3)
HEALTH STATUS, XMCV2T: ABNORMAL
HGB BLD-MCNC: 14.3 G/DL (ref 12.1–17)
HGB BLD-MCNC: 14.8 G/DL (ref 12.1–17)
HGB BLD-MCNC: 15.7 G/DL (ref 12.1–17)
HGB UR QL STRIP: ABNORMAL
IMM GRANULOCYTES # BLD AUTO: 0 K/UL (ref 0–0.04)
IMM GRANULOCYTES # BLD AUTO: 0.1 K/UL (ref 0–0.04)
IMM GRANULOCYTES NFR BLD AUTO: 0 % (ref 0–0.5)
IMM GRANULOCYTES NFR BLD AUTO: 1 % (ref 0–0.5)
INR PPP: 1 (ref 0.9–1.1)
INR PPP: 1.1 (ref 0.9–1.1)
INR PPP: 1.1 (ref 0.9–1.1)
KETONES UR QL STRIP.AUTO: NEGATIVE MG/DL
LACTATE SERPL-SCNC: 2.1 MMOL/L (ref 0.4–2)
LACTATE SERPL-SCNC: 2.9 MMOL/L (ref 0.4–2)
LDH SERPL L TO P-CCNC: 322 U/L (ref 85–241)
LDH SERPL L TO P-CCNC: 326 U/L (ref 85–241)
LEUKOCYTE ESTERASE UR QL STRIP.AUTO: NEGATIVE
LYMPHOCYTES # BLD: 0.7 K/UL (ref 0.8–3.5)
LYMPHOCYTES # BLD: 1.1 K/UL (ref 0.8–3.5)
LYMPHOCYTES NFR BLD: 10 % (ref 12–49)
LYMPHOCYTES NFR BLD: 12 % (ref 12–49)
MAGNESIUM SERPL-MCNC: 2 MG/DL (ref 1.6–2.4)
MAGNESIUM SERPL-MCNC: 2 MG/DL (ref 1.6–2.4)
MCH RBC QN AUTO: 33.4 PG (ref 26–34)
MCH RBC QN AUTO: 33.7 PG (ref 26–34)
MCH RBC QN AUTO: 34.8 PG (ref 26–34)
MCHC RBC AUTO-ENTMCNC: 32.8 G/DL (ref 30–36.5)
MCHC RBC AUTO-ENTMCNC: 33 G/DL (ref 30–36.5)
MCHC RBC AUTO-ENTMCNC: 34.1 G/DL (ref 30–36.5)
MCV RBC AUTO: 101.2 FL (ref 80–99)
MCV RBC AUTO: 102 FL (ref 80–99)
MCV RBC AUTO: 102.7 FL (ref 80–99)
MONOCYTES # BLD: 0.9 K/UL (ref 0–1)
MONOCYTES # BLD: 0.9 K/UL (ref 0–1)
MONOCYTES NFR BLD: 10 % (ref 5–13)
MONOCYTES NFR BLD: 12 % (ref 5–13)
NEUTS SEG # BLD: 5.4 K/UL (ref 1.8–8)
NEUTS SEG # BLD: 6.6 K/UL (ref 1.8–8)
NEUTS SEG NFR BLD: 74 % (ref 32–75)
NEUTS SEG NFR BLD: 76 % (ref 32–75)
NITRITE UR QL STRIP.AUTO: NEGATIVE
NRBC # BLD: 0 K/UL (ref 0–0.01)
NRBC BLD-RTO: 0 PER 100 WBC
O2/TOTAL GAS SETTING VFR VENT: 100 %
P-R INTERVAL, ECG05: 248 MS
PCO2 BLD: 37 MMHG (ref 35–45)
PH BLD: 7.4 [PH] (ref 7.35–7.45)
PH UR STRIP: 5.5 [PH] (ref 5–8)
PHOSPHATE SERPL-MCNC: 3.2 MG/DL (ref 2.6–4.7)
PLATELET # BLD AUTO: 182 K/UL (ref 150–400)
PLATELET # BLD AUTO: 210 K/UL (ref 150–400)
PLATELET # BLD AUTO: 230 K/UL (ref 150–400)
PMV BLD AUTO: 10.2 FL (ref 8.9–12.9)
PMV BLD AUTO: 10.4 FL (ref 8.9–12.9)
PMV BLD AUTO: 10.4 FL (ref 8.9–12.9)
PO2 BLD: 139 MMHG (ref 80–100)
POTASSIUM SERPL-SCNC: 4 MMOL/L (ref 3.5–5.1)
POTASSIUM SERPL-SCNC: 4.4 MMOL/L (ref 3.5–5.1)
POTASSIUM SERPL-SCNC: 4.7 MMOL/L (ref 3.5–5.1)
PROCALCITONIN SERPL-MCNC: 0.16 NG/ML
PROT SERPL-MCNC: 6.6 G/DL (ref 6.4–8.2)
PROT SERPL-MCNC: 6.9 G/DL (ref 6.4–8.2)
PROT SERPL-MCNC: 6.9 G/DL (ref 6.4–8.2)
PROT UR STRIP-MCNC: 30 MG/DL
PROTHROMBIN TIME: 10.9 SEC (ref 9–11.1)
PROTHROMBIN TIME: 11.1 SEC (ref 9–11.1)
PROTHROMBIN TIME: 11.4 SEC (ref 9–11.1)
Q-T INTERVAL, ECG07: 430 MS
QRS DURATION, ECG06: 82 MS
QTC CALCULATION (BEZET), ECG08: 430 MS
RBC # BLD AUTO: 4.28 M/UL (ref 4.1–5.7)
RBC # BLD AUTO: 4.39 M/UL (ref 4.1–5.7)
RBC # BLD AUTO: 4.51 M/UL (ref 4.1–5.7)
RBC #/AREA URNS HPF: ABNORMAL /HPF (ref 0–5)
RBC MORPH BLD: ABNORMAL
RBC MORPH BLD: ABNORMAL
SAMPLES BEING HELD,HOLD: NORMAL
SAO2 % BLD: 99 % (ref 92–97)
SERVICE CMNT-IMP: ABNORMAL
SODIUM SERPL-SCNC: 136 MMOL/L (ref 136–145)
SODIUM SERPL-SCNC: 137 MMOL/L (ref 136–145)
SODIUM SERPL-SCNC: 137 MMOL/L (ref 136–145)
SOURCE, COVRS: ABNORMAL
SP GR UR REFRACTOMETRY: 1.02 (ref 1–1.03)
SPECIMEN SOURCE, FCOV2M: ABNORMAL
SPECIMEN TYPE, XMCV1T: ABNORMAL
SPECIMEN TYPE: ABNORMAL
SPERM URNS QL MICRO: PRESENT
TOTAL RESP. RATE, ITRR: 40
TROPONIN I SERPL-MCNC: <0.05 NG/ML
TROPONIN I SERPL-MCNC: <0.05 NG/ML
UA: UC IF INDICATED,UAUC: ABNORMAL
UROBILINOGEN UR QL STRIP.AUTO: 1 EU/DL (ref 0.2–1)
VENTRICULAR RATE, ECG03: 60 BPM
WBC # BLD AUTO: 7.2 K/UL (ref 4.1–11.1)
WBC # BLD AUTO: 8.4 K/UL (ref 4.1–11.1)
WBC # BLD AUTO: 9 K/UL (ref 4.1–11.1)
WBC URNS QL MICRO: ABNORMAL /HPF (ref 0–4)

## 2020-01-01 PROCEDURE — 85384 FIBRINOGEN ACTIVITY: CPT

## 2020-01-01 PROCEDURE — 74011636320 HC RX REV CODE- 636/320

## 2020-01-01 PROCEDURE — 77030019905 HC CATH URETH INTMIT MDII -A

## 2020-01-01 PROCEDURE — 83880 ASSAY OF NATRIURETIC PEPTIDE: CPT

## 2020-01-01 PROCEDURE — 36600 WITHDRAWAL OF ARTERIAL BLOOD: CPT

## 2020-01-01 PROCEDURE — 71045 X-RAY EXAM CHEST 1 VIEW: CPT

## 2020-01-01 PROCEDURE — 85610 PROTHROMBIN TIME: CPT

## 2020-01-01 PROCEDURE — 82962 GLUCOSE BLOOD TEST: CPT

## 2020-01-01 PROCEDURE — 36415 COLL VENOUS BLD VENIPUNCTURE: CPT

## 2020-01-01 PROCEDURE — 96375 TX/PRO/DX INJ NEW DRUG ADDON: CPT

## 2020-01-01 PROCEDURE — 96374 THER/PROPH/DIAG INJ IV PUSH: CPT

## 2020-01-01 PROCEDURE — 86141 C-REACTIVE PROTEIN HS: CPT

## 2020-01-01 PROCEDURE — C9113 INJ PANTOPRAZOLE SODIUM, VIA: HCPCS | Performed by: NURSE PRACTITIONER

## 2020-01-01 PROCEDURE — 99285 EMERGENCY DEPT VISIT HI MDM: CPT

## 2020-01-01 PROCEDURE — 74177 CT ABD & PELVIS W/CONTRAST: CPT

## 2020-01-01 PROCEDURE — 83735 ASSAY OF MAGNESIUM: CPT

## 2020-01-01 PROCEDURE — 93971 EXTREMITY STUDY: CPT

## 2020-01-01 PROCEDURE — 74011000258 HC RX REV CODE- 258: Performed by: INTERNAL MEDICINE

## 2020-01-01 PROCEDURE — 73502 X-RAY EXAM HIP UNI 2-3 VIEWS: CPT

## 2020-01-01 PROCEDURE — 82728 ASSAY OF FERRITIN: CPT

## 2020-01-01 PROCEDURE — 74011000250 HC RX REV CODE- 250: Performed by: INTERNAL MEDICINE

## 2020-01-01 PROCEDURE — 96365 THER/PROPH/DIAG IV INF INIT: CPT

## 2020-01-01 PROCEDURE — 85025 COMPLETE CBC W/AUTO DIFF WBC: CPT

## 2020-01-01 PROCEDURE — 80053 COMPREHEN METABOLIC PANEL: CPT

## 2020-01-01 PROCEDURE — 87040 BLOOD CULTURE FOR BACTERIA: CPT

## 2020-01-01 PROCEDURE — 74011250636 HC RX REV CODE- 250/636: Performed by: EMERGENCY MEDICINE

## 2020-01-01 PROCEDURE — 83605 ASSAY OF LACTIC ACID: CPT

## 2020-01-01 PROCEDURE — 80076 HEPATIC FUNCTION PANEL: CPT

## 2020-01-01 PROCEDURE — 70450 CT HEAD/BRAIN W/O DYE: CPT

## 2020-01-01 PROCEDURE — 85379 FIBRIN DEGRADATION QUANT: CPT

## 2020-01-01 PROCEDURE — 82550 ASSAY OF CK (CPK): CPT

## 2020-01-01 PROCEDURE — 82565 ASSAY OF CREATININE: CPT

## 2020-01-01 PROCEDURE — 74011000258 HC RX REV CODE- 258: Performed by: NURSE PRACTITIONER

## 2020-01-01 PROCEDURE — 81001 URINALYSIS AUTO W/SCOPE: CPT

## 2020-01-01 PROCEDURE — 84100 ASSAY OF PHOSPHORUS: CPT

## 2020-01-01 PROCEDURE — 80048 BASIC METABOLIC PNL TOTAL CA: CPT

## 2020-01-01 PROCEDURE — 87635 SARS-COV-2 COVID-19 AMP PRB: CPT

## 2020-01-01 PROCEDURE — 83615 LACTATE (LD) (LDH) ENZYME: CPT

## 2020-01-01 PROCEDURE — 82803 BLOOD GASES ANY COMBINATION: CPT

## 2020-01-01 PROCEDURE — 93005 ELECTROCARDIOGRAM TRACING: CPT

## 2020-01-01 PROCEDURE — 74011000250 HC RX REV CODE- 250: Performed by: NURSE PRACTITIONER

## 2020-01-01 PROCEDURE — 96367 TX/PROPH/DG ADDL SEQ IV INF: CPT

## 2020-01-01 PROCEDURE — 5A09357 ASSISTANCE WITH RESPIRATORY VENTILATION, LESS THAN 24 CONSECUTIVE HOURS, CONTINUOUS POSITIVE AIRWAY PRESSURE: ICD-10-PCS | Performed by: HOSPITALIST

## 2020-01-01 PROCEDURE — 74011250637 HC RX REV CODE- 250/637: Performed by: EMERGENCY MEDICINE

## 2020-01-01 PROCEDURE — 87804 INFLUENZA ASSAY W/OPTIC: CPT

## 2020-01-01 PROCEDURE — 74011000258 HC RX REV CODE- 258: Performed by: EMERGENCY MEDICINE

## 2020-01-01 PROCEDURE — 90791 PSYCH DIAGNOSTIC EVALUATION: CPT

## 2020-01-01 PROCEDURE — 74011250637 HC RX REV CODE- 250/637: Performed by: NURSE PRACTITIONER

## 2020-01-01 PROCEDURE — 84145 PROCALCITONIN (PCT): CPT

## 2020-01-01 PROCEDURE — 65660000000 HC RM CCU STEPDOWN

## 2020-01-01 PROCEDURE — 94660 CPAP INITIATION&MGMT: CPT

## 2020-01-01 PROCEDURE — 74011250636 HC RX REV CODE- 250/636: Performed by: NURSE PRACTITIONER

## 2020-01-01 PROCEDURE — 84484 ASSAY OF TROPONIN QUANT: CPT

## 2020-01-01 PROCEDURE — 85027 COMPLETE CBC AUTOMATED: CPT

## 2020-01-01 RX ORDER — ACETAMINOPHEN 650 MG/1
650 SUPPOSITORY RECTAL
Status: DISCONTINUED | OUTPATIENT
Start: 2020-01-01 | End: 2021-01-01 | Stop reason: HOSPADM

## 2020-01-01 RX ORDER — NITROGLYCERIN 0.4 MG/1
0.4 TABLET SUBLINGUAL ONCE
Status: COMPLETED | OUTPATIENT
Start: 2020-01-01 | End: 2020-01-01

## 2020-01-01 RX ORDER — LORAZEPAM 2 MG/ML
0.5 INJECTION INTRAMUSCULAR
Status: ACTIVE | OUTPATIENT
Start: 2020-01-01 | End: 2020-01-01

## 2020-01-01 RX ORDER — DEXAMETHASONE SODIUM PHOSPHATE 4 MG/ML
6 INJECTION, SOLUTION INTRA-ARTICULAR; INTRALESIONAL; INTRAMUSCULAR; INTRAVENOUS; SOFT TISSUE EVERY 24 HOURS
Status: DISPENSED | OUTPATIENT
Start: 2020-01-01 | End: 2021-01-01

## 2020-01-01 RX ORDER — FLUTICASONE PROPIONATE AND SALMETEROL 100; 50 UG/1; UG/1
POWDER RESPIRATORY (INHALATION)
Qty: 60 EACH | Refills: 2 | Status: ON HOLD | OUTPATIENT
Start: 2020-01-01 | End: 2021-01-01

## 2020-01-01 RX ORDER — PROMETHAZINE HYDROCHLORIDE 25 MG/1
12.5 TABLET ORAL
Status: DISCONTINUED | OUTPATIENT
Start: 2020-01-01 | End: 2021-01-01 | Stop reason: HOSPADM

## 2020-01-01 RX ORDER — HEPARIN SODIUM 5000 [USP'U]/ML
5000 INJECTION, SOLUTION INTRAVENOUS; SUBCUTANEOUS EVERY 12 HOURS
Status: DISCONTINUED | OUTPATIENT
Start: 2020-01-01 | End: 2021-01-01

## 2020-01-01 RX ORDER — POLYETHYLENE GLYCOL 3350 17 G/17G
17 POWDER, FOR SOLUTION ORAL DAILY PRN
Status: DISCONTINUED | OUTPATIENT
Start: 2020-01-01 | End: 2021-01-01

## 2020-01-01 RX ORDER — SODIUM CHLORIDE 0.9 % (FLUSH) 0.9 %
10 SYRINGE (ML) INJECTION
Status: COMPLETED | OUTPATIENT
Start: 2020-01-01 | End: 2020-01-01

## 2020-01-01 RX ORDER — FUROSEMIDE 10 MG/ML
40 INJECTION INTRAMUSCULAR; INTRAVENOUS
Status: COMPLETED | OUTPATIENT
Start: 2020-01-01 | End: 2020-01-01

## 2020-01-01 RX ORDER — IPRATROPIUM BROMIDE AND ALBUTEROL SULFATE 2.5; .5 MG/3ML; MG/3ML
3 SOLUTION RESPIRATORY (INHALATION)
Status: DISCONTINUED | OUTPATIENT
Start: 2020-01-01 | End: 2021-01-01 | Stop reason: HOSPADM

## 2020-01-01 RX ORDER — FENTANYL CITRATE 50 UG/ML
50 INJECTION, SOLUTION INTRAMUSCULAR; INTRAVENOUS
Status: DISCONTINUED | OUTPATIENT
Start: 2020-01-01 | End: 2020-01-01 | Stop reason: HOSPADM

## 2020-01-01 RX ORDER — SODIUM CHLORIDE 0.9 % (FLUSH) 0.9 %
5-40 SYRINGE (ML) INJECTION EVERY 8 HOURS
Status: DISCONTINUED | OUTPATIENT
Start: 2020-01-01 | End: 2021-01-01 | Stop reason: HOSPADM

## 2020-01-01 RX ORDER — SODIUM CHLORIDE 0.9 % (FLUSH) 0.9 %
5-40 SYRINGE (ML) INJECTION AS NEEDED
Status: DISCONTINUED | OUTPATIENT
Start: 2020-01-01 | End: 2021-01-01 | Stop reason: HOSPADM

## 2020-01-01 RX ORDER — METRONIDAZOLE 500 MG/100ML
500 INJECTION, SOLUTION INTRAVENOUS
Status: COMPLETED | OUTPATIENT
Start: 2020-01-01 | End: 2020-01-01

## 2020-01-01 RX ORDER — AMOXICILLIN 250 MG
1 CAPSULE ORAL 2 TIMES DAILY
Status: DISCONTINUED | OUTPATIENT
Start: 2020-01-01 | End: 2021-01-01

## 2020-01-01 RX ORDER — ONDANSETRON 2 MG/ML
4 INJECTION INTRAMUSCULAR; INTRAVENOUS
Status: DISCONTINUED | OUTPATIENT
Start: 2020-01-01 | End: 2021-01-01 | Stop reason: HOSPADM

## 2020-01-01 RX ORDER — ACETAMINOPHEN 325 MG/1
650 TABLET ORAL
Status: DISCONTINUED | OUTPATIENT
Start: 2020-01-01 | End: 2021-01-01 | Stop reason: HOSPADM

## 2020-01-01 RX ORDER — BUMETANIDE 0.25 MG/ML
1 INJECTION INTRAMUSCULAR; INTRAVENOUS ONCE
Status: COMPLETED | OUTPATIENT
Start: 2020-01-01 | End: 2020-01-01

## 2020-01-01 RX ADMIN — FUROSEMIDE 40 MG: 10 INJECTION, SOLUTION INTRAMUSCULAR; INTRAVENOUS at 22:56

## 2020-01-01 RX ADMIN — CEFTRIAXONE 1 G: 1 INJECTION, POWDER, FOR SOLUTION INTRAMUSCULAR; INTRAVENOUS at 21:58

## 2020-01-01 RX ADMIN — HEPARIN SODIUM 5000 UNITS: 5000 INJECTION INTRAVENOUS; SUBCUTANEOUS at 14:57

## 2020-01-01 RX ADMIN — METHYLPREDNISOLONE SODIUM SUCCINATE 125 MG: 125 INJECTION, POWDER, FOR SOLUTION INTRAMUSCULAR; INTRAVENOUS at 00:57

## 2020-01-01 RX ADMIN — CEFEPIME HYDROCHLORIDE 2 G: 2 INJECTION, POWDER, FOR SOLUTION INTRAVENOUS at 00:56

## 2020-01-01 RX ADMIN — NITROGLYCERIN 0.4 MG: 0.4 TABLET, ORALLY DISINTEGRATING SUBLINGUAL at 22:55

## 2020-01-01 RX ADMIN — IOPAMIDOL 100 ML: 755 INJECTION, SOLUTION INTRAVENOUS at 10:49

## 2020-01-01 RX ADMIN — DEXMEDETOMIDINE 0.4 MCG/KG/HR: 100 INJECTION, SOLUTION, CONCENTRATE INTRAVENOUS at 13:08

## 2020-01-01 RX ADMIN — METRONIDAZOLE 500 MG: 500 INJECTION, SOLUTION INTRAVENOUS at 00:56

## 2020-01-01 RX ADMIN — Medication 10 ML: at 14:58

## 2020-01-01 RX ADMIN — Medication 10 ML: at 10:49

## 2020-01-01 RX ADMIN — HEPARIN SODIUM 5000 UNITS: 5000 INJECTION INTRAVENOUS; SUBCUTANEOUS at 02:45

## 2020-01-01 RX ADMIN — IOHEXOL 20 ML: 240 INJECTION, SOLUTION INTRATHECAL; INTRAVASCULAR; INTRAVENOUS; ORAL at 10:49

## 2020-01-01 RX ADMIN — DEXAMETHASONE SODIUM PHOSPHATE 6 MG: 4 INJECTION, SOLUTION INTRAMUSCULAR; INTRAVENOUS at 08:21

## 2020-01-01 RX ADMIN — BUMETANIDE 1 MG: 0.25 INJECTION, SOLUTION INTRAMUSCULAR; INTRAVENOUS at 02:45

## 2020-01-01 RX ADMIN — Medication 10 ML: at 08:24

## 2020-01-01 RX ADMIN — PANTOPRAZOLE SODIUM 40 MG: 40 INJECTION, POWDER, FOR SOLUTION INTRAVENOUS at 08:21

## 2020-01-01 RX ADMIN — AZITHROMYCIN 500 MG: 500 INJECTION, POWDER, LYOPHILIZED, FOR SOLUTION INTRAVENOUS at 02:44

## 2020-01-01 RX ADMIN — Medication 10 ML: at 21:58

## 2020-03-23 NOTE — TELEPHONE ENCOUNTER
PCP: Margret Cheung MD    Last appt: 8/14/2018  Future Appointments   Date Time Provider Sudarshan Watkins   12/9/2020  1:15 PM PACEMAKER, [facility] SCH[facility]   12/9/2020  1:40 PM Fara Morales, ANP [facility] SCHED       Requested Prescriptions     Pending Prescriptions Disp Refills    fluticasone propion-salmeteroL (Advair Diskus) 100-50 mcg/dose diskus inhaler 60 Each 5       Pharmacy [facility]    Patient has ? days' supply of medication available.     Prior labs and Blood pressures:  BP Readings from Last 3 Encounters:   12/09/19 146/72   12/05/19 146/72   04/18/19 118/74     Lab Results   Component Value Date/Time    Sodium 138 07/18/2018 06:20 AM    Potassium 4.5 07/18/2018 06:20 AM    Chloride 106 07/18/2018 06:20 AM    CO2 26 07/18/2018 06:20 AM    Anion gap 6 07/18/2018 06:20 AM    Glucose 96 07/18/2018 06:20 AM    BUN 17 07/18/2018 06:20 AM    Creatinine 1.31 (H) 07/18/2018 06:20 AM    BUN/Creatinine ratio 13 07/18/2018 06:20 AM    GFR est AA >60 07/18/2018 06:20 AM    GFR est non-AA 53 (L) 07/18/2018 06:20 AM    Calcium 8.6 07/18/2018 06:20 AM     Lab Results   Component Value Date/Time    Hemoglobin A1c 5.8 09/01/2009 08:53 AM     Lab Results   Component Value Date/Time    Cholesterol, total 126 09/06/2017 10:45 AM    HDL Cholesterol 52 09/06/2017 10:45 AM    LDL, calculated 63 09/06/2017 10:45 AM    VLDL, calculated 11 09/06/2017 10:45 AM    Triglyceride 57 09/06/2017 10:45 AM    CHOL/HDL Ratio 2.9 07/13/2010 10:27 AM     Lab Results   Component Value Date/Time    Vitamin D 25-Hydroxy 34.5 07/21/2011 10:22 AM    VITAMIN D, 25-HYDROXY 49.9 09/06/2017 10:45 AM       Lab Results   Component Value Date/Time    TSH 1.650 09/06/2017 10:45 AM

## 2020-12-15 NOTE — ED PROVIDER NOTES
EMERGENCY DEPARTMENT HISTORY AND PHYSICAL EXAM 
 
 
Date: 12/15/2020 Patient Name: Ramirez Waite History of Presenting Illness Chief Complaint Patient presents with  Leg Pain Per EMS, patient has had left leg pain since Sunday. Per EMS caregiver noticed that patient was favoring left leg when walking today and called 911 to have patient transported to ED. Per EMS patient is on 2-4L home oxygen at baseline. History Provided By: Patient HPI: Ramirez Waite, [de-identified] y.o. male with a past medical history significant for pulmonary fibrosis on oxygen, chronic kidney disease, medical problems as stated below presents to the ED with cc of severe left lower extremity pain and inability to ambulate over the last 48 hours. It is unclear if the patient has suffered a fall but this family strongly believes he did. He denies any symptoms in his left upper extremity. They do report he is typically able to walk unsupported and now can no can a longer do so. He denies any chest pain, trouble breathing, headache, visual changes, speech difficulties, or any other associated symptoms. No other exacerbating ameliorating factors. There are no other complaints, changes, or physical findings at this time. PCP: Wendy Malhotra MD 
 
No current facility-administered medications on file prior to encounter. Current Outpatient Medications on File Prior to Encounter Medication Sig Dispense Refill  fluticasone propion-salmeteroL (Advair Diskus) 100-50 mcg/dose diskus inhaler INHALE 1 PUFF TWO TIMES DAILY THEN RINSE MOUTH WELL AFTER 60 Each 2  
 pravastatin (PRAVACHOL) 40 mg tablet TAKE 1 TABLET EVERY EVENING TO HELP LOWER CHOLESTEROL 30 Tab 0  
 omeprazole (PRILOSEC) 20 mg capsule TAKE 1 CAPSULES BY MOUTH DAILY. TO REDUCE STOMACH ACID 90 Cap 0  
 aspirin delayed-release 81 mg tablet Take  by mouth daily.     
 ketoconazole (NIZORAL) 2 % topical cream     
  albuterol (PROVENTIL VENTOLIN) 2.5 mg /3 mL (0.083 %) nebulizer solution 3 mL by Nebulization route every four (4) hours as needed for Wheezing or Shortness of Breath. 1 Package 1  
 ALBUTEROL SULFATE (PROAIR HFA IN) Take 2 puffs by inhalation four (4) times daily as needed.  cholecalciferol, vitamin d3, (VITAMIN D) 1,000 unit tablet Take  by mouth daily.  hydrocortisone (WESTCORT) 0.2 % topical cream     
 loratadine (CLARITIN) 10 mg tablet Take 10 mg by mouth daily.  coenzyme q10 30 mg capsule Take 30 mg by mouth three (3) times daily.  azelastine (ASTELIN) 137 mcg nasal spray 1 Spray by Nasal route two (2) times a day. Use in each nostril as directed Past History Past Medical History: 
Past Medical History:  
Diagnosis Date  Arthritis   
 parris knee  Asthma   
 dr Roni Tolliver 5/18/17  Basal cell carcinoma of skin 05/10/2017  
 right helix [facility] David Baez's note rec'd  CAD (coronary artery disease)   
 dr Lino Walker  CKD (chronic kidney disease), stage III 05/2014 Justa Palencia notes 8/8/17 note and lab  Colon polyps 09/25/13  
 also dad with colon cancer  Contact dermatitis and other eczema, due to unspecified cause   
 eczema dr Baltazar Mahajan  COPD   
 dr Ellen Estrella chronic bronchitis     5/18/17  Elevated serum creatinine  Fall 11 & 12/2012 Right knee gave away and tripped in his house  GERD (gastroesophageal reflux disease)   
 dr Carson Frye (hard of hearing)  Hypercholesterolemia  ILD (interstitial lung disease) ([facility] [address] 07/15/2014 Dr Júnior Manley's   pulmonary fibrosis 5/18/17 f/u note  Proteinuria 10/2012  
 abou assi     12/2/16  RLS (restless legs syndrome)  Schatzki's ring 5/03  
 dilation dr Laverne Ravi  Screening for glaucoma 09/21/2016 [facility] note rec'd (annual eye exam)  Shingles 4/2012  Sleep apnea   
 uses CPAP- 7/6/17 f/u note [facility] note/sleep study 9/8/17 Past Surgical History: 
Past Surgical History:  
Procedure Laterality Date  ABDOMEN SURGERY PROC UNLISTED    
 left inq hernia  CARDIAC SURG PROCEDURE UNLIST  2017 Pacemaker  ENDOSCOPY, COLON, DIAGNOSTIC    
  dr Sammie Martinez repeat in 5 yrs  HX COLONOSCOPY  [id number]  
 brady- ileocecal valve polyp 5 yr repeat [facility]  Summer 2017 Right ear basal cell carcinoma  HX HERNIA REPAIR  2018 Open left inguinal hernia repair with mesh.  HX PACEMAKER  2017 Dr. Sherly Perez  2017  
 3 teeth extraction. Family History: 
Family History Problem Relation Age of Onset  Cancer Father   
      from colon cancer Social History: 
Social History Tobacco Use  Smoking status: Former Smoker  Smokeless tobacco: Former User Quit date: 1985 Substance Use Topics  Alcohol use: No  
 Drug use: No  
 
 
Allergies: 
No Known Allergies Review of Systems Review of Systems Constitutional: Negative for chills, diaphoresis, fatigue and fever. HENT: Negative for ear pain and sore throat. Eyes: Negative for pain and redness. Respiratory: Negative for cough and shortness of breath. Cardiovascular: Negative for chest pain and leg swelling. Gastrointestinal: Negative for abdominal pain, diarrhea, nausea and vomiting. Endocrine: Negative for cold intolerance and heat intolerance. Genitourinary: Negative for flank pain and hematuria. Musculoskeletal: Positive for arthralgias and myalgias. Negative for back pain and neck stiffness. Skin: Negative for rash and wound. Neurological: Positive for weakness. Negative for dizziness, syncope and headaches. All other systems reviewed and are negative. Physical Exam  
Physical Exam 
Vitals signs and nursing note reviewed. Constitutional:   
   Appearance: He is well-developed. HENT:  
   Head: Normocephalic and atraumatic. Mouth/Throat:  
   Pharynx: No oropharyngeal exudate. Eyes:  
   General: No visual field deficit. Conjunctiva/sclera: Conjunctivae normal.  
   Pupils: Pupils are equal, round, and reactive to light. Neck: Musculoskeletal: Normal range of motion. Cardiovascular:  
   Rate and Rhythm: Normal rate and regular rhythm. Heart sounds: No murmur. Pulmonary:  
   Effort: Pulmonary effort is normal. No respiratory distress. Breath sounds: Normal breath sounds. No wheezing. Abdominal:  
   General: Bowel sounds are normal. There is no distension. Palpations: Abdomen is soft. Tenderness: There is no abdominal tenderness. Musculoskeletal: Normal range of motion. General: No deformity. Comments: Left lower extremity: Patient has severe pain with flexion abduction or internal and external rotation of the left hip. His left lower extremity has 1+ edema with engorgement of the superficial veins of the leg. There is no good sensation L2-S1 and good distal pulses. He has good cap refill. He has no bony tenderness throughout the mid thigh, knee, lower leg, ankle, foot. He does appear to have some tenderness in the left groin region without palpable hernia. Left upper extremity: Patient has severe restriction range of motion of the left arm but it does not seem to have any convincing obvious weakness. He has good distal radial ulnar pulses and symmetric  strength when compared to the right upper extremity. His daughter and the patient say this is chronic due to do multiple injuries to his rotator cuff. His sensation is intact throughout C5-T1. Skin: 
   General: Skin is warm and dry. Findings: No rash. Neurological:  
   Mental Status: He is alert and oriented to person, place, and time. GCS: GCS eye subscore is 4. GCS verbal subscore is 5. GCS motor subscore is 6. Cranial Nerves: No cranial nerve deficit, dysarthria or facial asymmetry. Sensory: No sensory deficit. Coordination: Coordination normal.  
   Comments: Very hard to assess patient's strength in his left upper and left lower extremity. He appears to have some chronic severe restrictions of the left upper extremity and atrophy likely due to old injury. Patient's left hip injury and swelling also limits her strength exam.  He cannot hold his left up against gravity. However dorsi and plantar flexion strength appeared normal.  
Psychiatric:     
   Behavior: Behavior normal.  
 
 
 
Diagnostic Study Results Labs - Recent Results (from the past 24 hour(s)) CBC WITH AUTOMATED DIFF Collection Time: 12/15/20  3:24 PM  
Result Value Ref Range WBC 9.0 4.1 - 11.1 K/uL  
 RBC 4.51 4.10 - 5.70 M/uL  
 HGB 15.7 12.1 - 17.0 g/dL HCT 46.0 36.6 - 50.3 % .0 (H) 80.0 - 99.0 FL  
 MCH 34.8 (H) 26.0 - 34.0 PG  
 MCHC 34.1 30.0 - 36.5 g/dL  
 RDW 14.3 11.5 - 14.5 % PLATELET 490 344 - 707 K/uL MPV 10.2 8.9 - 12.9 FL  
 NRBC 0.0 0  WBC ABSOLUTE NRBC 0.00 0.00 - 0.01 K/uL NEUTROPHILS 74 32 - 75 % LYMPHOCYTES 12 12 - 49 % MONOCYTES 10 5 - 13 % EOSINOPHILS 4 0 - 7 % BASOPHILS 0 0 - 1 % IMMATURE GRANULOCYTES 0 0.0 - 0.5 % ABS. NEUTROPHILS 6.6 1.8 - 8.0 K/UL  
 ABS. LYMPHOCYTES 1.1 0.8 - 3.5 K/UL  
 ABS. MONOCYTES 0.9 0.0 - 1.0 K/UL  
 ABS. EOSINOPHILS 0.4 0.0 - 0.4 K/UL  
 ABS. BASOPHILS 0.0 0.0 - 0.1 K/UL  
 ABS. IMM. GRANS. 0.0 0.00 - 0.04 K/UL  
 DF AUTOMATED METABOLIC PANEL, COMPREHENSIVE Collection Time: 12/15/20  3:24 PM  
Result Value Ref Range Sodium 137 136 - 145 mmol/L Potassium 4.7 3.5 - 5.1 mmol/L Chloride 107 97 - 108 mmol/L  
 CO2 28 21 - 32 mmol/L Anion gap 2 (L) 5 - 15 mmol/L Glucose 78 65 - 100 mg/dL BUN 41 (H) 6 - 20 MG/DL Creatinine 1.92 (H) 0.70 - 1.30 MG/DL  
 BUN/Creatinine ratio 21 (H) 12 - 20 GFR est AA 41 (L) >60 ml/min/1.73m2 GFR est non-AA 34 (L) >60 ml/min/1.73m2 Calcium 8.4 (L) 8.5 - 10.1 MG/DL Bilirubin, total 0.7 0.2 - 1.0 MG/DL  
 ALT (SGPT) 20 12 - 78 U/L  
 AST (SGOT) 18 15 - 37 U/L Alk. phosphatase 110 45 - 117 U/L Protein, total 6.9 6.4 - 8.2 g/dL Albumin 3.2 (L) 3.5 - 5.0 g/dL Globulin 3.7 2.0 - 4.0 g/dL A-G Ratio 0.9 (L) 1.1 - 2.2 PROTHROMBIN TIME + INR Collection Time: 12/15/20  3:24 PM  
Result Value Ref Range INR 1.0 0.9 - 1.1 Prothrombin time 10.9 9.0 - 11.1 sec TROPONIN I Collection Time: 12/15/20  3:24 PM  
Result Value Ref Range Troponin-I, Qt. <0.05 <0.05 ng/mL SAMPLES BEING HELD Collection Time: 12/15/20  3:24 PM  
Result Value Ref Range SAMPLES BEING HELD RED COMMENT Add-on orders for these samples will be processed based on acceptable specimen integrity and analyte stability, which may vary by analyte. GLUCOSE, POC Collection Time: 12/15/20  3:44 PM  
Result Value Ref Range Glucose (POC) 101 (H) 65 - 100 mg/dL Performed by Carlie Ernandez EKG, 12 LEAD, INITIAL Collection Time: 12/15/20  3:46 PM  
Result Value Ref Range Ventricular Rate 60 BPM  
 Atrial Rate 45 BPM  
 P-R Interval 248 ms QRS Duration 82 ms Q-T Interval 430 ms QTC Calculation (Bezet) 430 ms Calculated P Axis 10 degrees Calculated R Axis -53 degrees Calculated T Axis -57 degrees Diagnosis AV dual-paced rhythm with prolonged AV conduction When compared with ECG of 16-JUL-2018 01:56, 
Electronic ventricular pacemaker has replaced Sinus rhythm Confirmed by Radha Garcia ([id number]) on 12/15/2020 4:36:15 PM 
  
URINALYSIS W/ REFLEX CULTURE Collection Time: 12/15/20  6:16 PM  
 Specimen: Urine Result Value Ref Range Color YELLOW/STRAW Appearance CLEAR CLEAR Specific gravity 1.024 1.003 - 1.030    
 pH (UA) 5.5 5.0 - 8.0 Protein 30 (A) NEG mg/dL Glucose Negative NEG mg/dL Ketone Negative NEG mg/dL Bilirubin Negative NEG  Blood TRACE (A) NEG    
 Urobilinogen 1.0 0.2 - 1.0 EU/dL Nitrites Negative NEG Leukocyte Esterase Negative NEG    
 WBC 0-4 0 - 4 /hpf  
 RBC 0-5 0 - 5 /hpf Epithelial cells FEW FEW /lpf Bacteria Negative NEG /hpf  
 UA:UC IF INDICATED CULTURE NOT INDICATED BY UA RESULT [facility] [place] PRESENT Radiologic Studies -  
XR HIP LT W OR WO PELV 2-3 VWS Final Result IMPRESSION:   
No acute bony abnormality is confirmed. If a repeat AP view of the left hip is  
desired, this may be performed at no charge to the patient. XR CHEST SNGL V Final Result IMPRESSION:  
  
1. Interstitial edema/CHF pattern is favored, on a background of chronic  
interstitial lung disease previously demonstrated. .  . CT HEAD WO CONT Final Result IMPRESSION:   
  
1. No acute intracranial abnormality. 2. Age-related change. CT Results  (Last 48 hours) 12/15/20 1736  CT HEAD WO CONT Final result Impression:  IMPRESSION:   
   
1. No acute intracranial abnormality. 2. Age-related change. Narrative:  EXAM: CT HEAD WO CONT INDICATION: Falls, weakness. Left lower extremity pain with recent fall. COMPARISON: None. CONTRAST: None. TECHNIQUE: Unenhanced CT of the head was performed using 5 mm images. Brain and  
bone windows were generated. Coronal and sagittal reformats. CT dose reduction  
was achieved through use of a standardized protocol tailored for this  
examination and automatic exposure control for dose modulation. FINDINGS:  
Generalized prominence of cerebral sulci and ventricles is mild to moderate but  
commensurate with age. . There is no significant white matter disease. There is  
no intracranial hemorrhage, extra-axial collection, or mass effect. The basilar  
cisterns are open. No CT evidence of acute infarct. The bone windows demonstrate no abnormalities.  The visualized portions of the  
 paranasal sinuses and mastoid air cells are clear. CXR Results  (Last 48 hours) 12/15/20 1754  XR CHEST SNGL V Final result Impression:  IMPRESSION:  
   
1. Interstitial edema/CHF pattern is favored, on a background of chronic  
interstitial lung disease previously demonstrated. .  . Narrative:  INDICATION:  CVA EXAM: Chest single view. COMPARISON: 9/6/2017. FINDINGS: A single frontal view of the chest at 1651 hours shows diffuse  
interstitial infiltrate, new or significantly increased since the prior study. Ronold Kanner The heart, mediastinum and pulmonary vasculature are stable with cardiomegaly. Transvenous pacemaker from the left is stable. .  The bony thorax is  
unremarkable for age. .  
   
  
  
 
 
 
Medical Decision Making I am the first provider for this patient. I reviewed the vital signs, available nursing notes, past medical history, past surgical history, family history and social history. Vital Signs-Reviewed the patient's vital signs. Patient Vitals for the past 12 hrs: 
 Temp Pulse Resp BP SpO2  
12/15/20 1920 97.3 °F (36.3 °C) 62 29 (!) 108/56 97 % 12/15/20 1800 97.3 °F (36.3 °C) 67 25 134/68 97 % 12/15/20 1453 97.3 °F (36.3 °C) 65 20 (!) 102/42 97 % Records Reviewed: Nursing records and medical records reviewed MDM: 
Acute hip fracture, versus femur fracture versus DVT versus acute stroke versus metabolic derangement Provider Notes (Medical Decision Making):  
 72-year-old male presenting with severe left lower extremity pain of unclear etiology with no known history of trauma. Both the patient and the daughter who is his caregiver reported their only concern was that he may have a DVT or hip fracture. He is able to range his hip relatively freely. His x-ray of the hip is negative. His DVT ultrasound of the lower extremity showed no DVT. I did do a CT scan of his head which was normal.  His blood work was unremarkable. We both observe the patient ambulating with walker at her baseline. I did discuss with admission given patient's fragile health and reported complaints. However both the patient and family feel strongly that he would be better off and better served at home. They will return to the ER if he worsens in any way. I discussed the possible interstitial edema on x-ray but he is not complaining of any shortness of breath and they have not noticed any change in his pulmonary fibrosis baseline. ED Course:  
Initial assessment performed. The patients presenting problems have been discussed, and they are in agreement with the care plan formulated and outlined with them. I have encouraged them to ask questions as they arise throughout their visit. Critical Care: 
None Disposition: 
12:00 AM 
 Lainey Ortega Jr's  results have been reviewed with him. He has been counseled regarding his diagnosis. He verbally conveys understanding and agreement of the signs, symptoms, diagnosis, treatment and prognosis and additionally agrees to follow up as recommended with Dr. Eli Stoll MD in 24 - 48 hours. He also agrees with the care-plan and conveys that all of his questions have been answered. I have also put together some discharge instructions for him that include: 1) educational information regarding their diagnosis, 2) how to care for their diagnosis at home, as well a 3) list of reasons why they would want to return to the ED prior to their follow-up appointment, should their condition change. DISCHARGE PLAN: 
1. Discharge Medication List as of 12/15/2020  7:29 PM  
  
 
2. Follow-up Information Follow up With Specialties Details Why Contact Bo Stoll MD Family Medicine In 2 days For a follow-up evaluation. Please return to the ED immediately with any worsening trouble breathing, difficult walking, or any other new concerns. [address] [facility] [address] [address] 
[phone] Kev Vasquez NP Nurse Practitioner In 3 days For a follow-up evaluation. For a follow-up evaluation. Please return to the ED immediately with any worsening trouble breathing, difficult walking, or any other new concerns. [address] 
[phone] Divine Treadwell MD Cardiology, Cardio Vascular Surgery, Internal Medicine In 1 week For a follow-up evaluation. You can follow-up with them in regards to your fluid on the lung. [address] 
[phone] 3. Return to ED if worse Diagnosis Clinical Impression: 1. Hip strain, left, initial encounter 2. Acute pulmonary edema (HCC) 3. Hyponatremia Attestations: 
 
Anabel Caldera MD 
 
 Please note that this dictation was completed with [employer], the computer voice recognition software. Quite often unanticipated grammatical, syntax, homophones, and other interpretive errors are inadvertently transcribed by the computer software. Please disregard these errors. Please excuse any errors that have escaped final proofreading. Thank you.

## 2020-12-15 NOTE — ED NOTES
Pt's family states pt is at baseline only difference is pain in L leg since this am.  
 
16:33 Pt states he has to have a bowel movement. Pt placed on bed pan. 17:41  Pt back from CT. 
 
17:42  Pt to xray. 18:49 Pt ambulated in room with walker at baseline per daughter.

## 2020-12-16 NOTE — DISCHARGE INSTRUCTIONS
Hyponatremia: Care Instructions  Your Care Instructions     Hyponatremia (say \"gg-zh-lqx-TREE-karma-uh\") means that you don't have enough sodium in your blood. It can cause nausea, vomiting, and headaches. Or you may not feel hungry. In serious cases, it can cause seizures, a coma, or even death. Hyponatremia is not a disease. It is a problem caused by something else, such as medicines or exercising for a long time in hot weather. You can get hyponatremia if you lose a lot of fluids and then you drink a lot of water or other liquids that don't have much sodium. You can also get it if you have kidney, liver, heart, or other health problems. Treatment is focused on getting your sodium levels back to normal.  Follow-up care is a key part of your treatment and safety. Be sure to make and go to all appointments, and call your doctor if you are having problems. It's also a good idea to know your test results and keep a list of the medicines you take. How can you care for yourself at home? · If your doctor recommends it, drink fluids that have sodium. Sports drinks are a good choice. Or you can eat salty foods. · If your doctor recommends it, limit the amount of water you drink. And limit fluids that are mostly water. These include tea, coffee, and juice. · Take your medicines exactly as prescribed. Call your doctor if you have any problems with your medicine. · Get your sodium levels tested when your doctor tells you to. When should you call for help? Call 911 anytime you think you may need emergency care. For example, call if:    · You have a seizure.     · You passed out (lost consciousness).    Call your doctor now or seek immediate medical care if:    · You are confused or it is hard to focus.     · You have little or no appetite.     · You feel sick to your stomach or you vomit.     · You have a headache.     · You have mood changes.     · You feel more tired than usual.   Watch closely for changes in your health, and be sure to contact your doctor if:    · You do not get better as expected. Where can you learn more? Go to http://www.gray.com/  Enter U075 in the search box to learn more about \"Hyponatremia: Care Instructions. \"  Current as of: December 9, 2019               Content Version: 12.6  © 9813-8652 [employer]. Care instructions adapted under license by [employer] (which disclaims liability or warranty for this information). If you have questions about a medical condition or this instruction, always ask your healthcare professional. [address] any warranty or liability for your use of this information. Hip Flexor Strain: Rehab Exercises  Introduction  Here are some examples of exercises for you to try. The exercises may be suggested for a condition or for rehabilitation. Start each exercise slowly. Ease off the exercises if you start to have pain. You will be told when to start these exercises and which ones will work best for you. How to do the exercises  Pelvic tilt with marching   1. Lie on your back with your knees bent and your feet flat on the floor. 2. Tighten your belly muscles and buttocks, and press your lower back to the floor. 3. Keeping your knees bent, lift and then lower one leg up off the floor, and then lift and lower your other leg like you are marching. Each time you lift your leg, hold that position for about 6 seconds before lowering your leg. 4. Repeat 8 to 12 times. Scissors   1. Lie on your back with your knees bent at a 90-degree angle and your feet off the floor. 2. Tighten your belly muscles and buttocks, and press your lower back to the floor. 3. Slowly straighten one leg, and hold that position for about 6 seconds. Your leg should be about 12 inches off the floor. Bring that leg back to the starting position, and then straighten your other leg.  Hold that position for about 6 seconds, and then switch legs again. 4. Repeat 8 to 12 times. Hamstring stretch (lying down)   1. Lie flat on your back with your legs straight. If you feel discomfort in your back, place a small towel roll under your lower back. 2. Holding the back of your affected leg for support, lift your leg straight up and toward your body until you feel a stretch at the back of your thigh. 3. Hold the stretch for at least 30 seconds. 4. Repeat 2 to 4 times. Quadricep and hip flexor stretch (lying on side)   1. Lie on your side with your good leg flat on the floor and your hand supporting your head. 2. Bend your top leg, and reach behind you to grab the front of that foot or ankle with your other hand. 3. Stretch your leg back by pulling your foot toward your buttock. You will feel the stretch in the front of your thigh. If this causes stress on your knee, do not do this stretch. 4. Hold the stretch for at least 15 to 30 seconds. 5. Repeat 2 to 4 times. Hip flexor stretch (kneeling)   1. Kneel on your affected leg and bend your good leg out in front of you, with that foot flat on the floor. If you feel discomfort in the front of your knee, place a towel under your knee. 2. Keeping your back straight, slowly push your hips forward until you feel a stretch in the upper thigh of your back leg and hip. 3. Hold the stretch for at least 15 to 30 seconds. 4. Repeat 2 to 4 times. Hip flexor stretch (edge of table)   1. Lie flat on your back on a table or flat bench, with your knees and lower legs hanging off the edge of the table. 2. Grab your good leg at the knee, and pull that knee back toward your chest. Relax your affected leg and let it hang down toward the floor until you feel a stretch in the upper thigh of your affected leg and hip. 3. Hold the stretch for at least 15 to 30 seconds. 4. Repeat 2 to 4 times. Follow-up care is a key part of your treatment and safety.  Be sure to make and go to all appointments, and call your doctor if you are having problems. It's also a good idea to know your test results and keep a list of the medicines you take. Where can you learn more? Go to http://www.gray.com/  Enter Y830 in the search box to learn more about \"Hip Flexor Strain: Rehab Exercises. \"  Current as of: March 2, 2020               Content Version: 12.6  © 2006-2020 [employer]. Care instructions adapted under license by [employer] (which disclaims liability or warranty for this information). If you have questions about a medical condition or this instruction, always ask your healthcare professional. [address] any warranty or liability for your use of this information. Pulmonary Edema: Care Instructions  Overview     Pulmonary edema is the buildup of fluid in the lungs. It usually occurs when the heart does not pump blood through the body properly. Pulmonary edema can also be caused by another disease, such as liver or kidney failure. It can also happen at high altitudes, from a poisoning, or as a result of a nonfatal drowning. If you have fluid in your lungs, you may have trouble breathing, be restless, have a fast heart rate, or cough up foamy pink fluid. Breathing problems may be worse when you lie down. Follow-up care is a key part of your treatment and safety. Be sure to make and go to all appointments, and call your doctor if you are having problems. It's also a good idea to know your test results and keep a list of the medicines you take. How can you care for yourself at home? Medicines    · Take your medicines exactly as prescribed. Call your doctor if you think you are having a problem with your medicine.     · Review all of your regular medicines with your doctor. Do not take any vitamins, over-the-counter medicines, or herbal products without talking to your doctor first.   Diet    · Eat a balanced diet.  Make an appointment with a dietitian if you have questions about what type of diet might be best for you.     · Do not eat more than 2,000 milligrams (mg) of sodium each day. That is less than 1 teaspoon of salt a day, including all the salt you eat in prepared or packaged foods. ? Do not add salt while you are cooking or at the table. Flavor with garlic, lemon juice, onion, vinegar, herbs, and spices instead of salt. ? Eat fewer processed foods and foods from restaurants, including fast food. ? Use fresh or frozen foods instead of canned. ? Count and record how much sodium you eat each day. Check food labels for sodium. ? Ask your doctor before using salt substitutes that have potassium, such as Lite Salt. Lifestyle    · Stay out of air pollution; smog; cold, dry air; hot, humid air; and high altitudes.     · Learn breathing methods that help the airflow in and out of your lungs.     · Take rest breaks often. Schedule short rest breaks when doing housework and other activities. An occupational or physical therapist can help you find ways to do everyday activities with less effort.     · Start light exercise if your doctor says it is okay. Try to stay as active as possible. If you have not exercised in the past, start out slowly. Walking is a good way to start.     · Get enough rest at night. Sleeping with 1 or 2 pillows under your upper body and head may help you breathe easier at night.     · Discuss rehabilitation with your doctor. Find out what programs are available in your area.     · Do not smoke or use other tobacco products. Smoking can make your condition worse. If you need help quitting, talk to your doctor about stop-smoking programs and medicines. These can increase your chances of quitting for good.     · Do not use alcohol or illegal drugs. When should you call for help? Call 911 anytime you think you may need emergency care.  For example, call if:    · You have severe trouble breathing.     · You passed out (lost consciousness).     · You have symptoms of a heart attack. These may include:  ? Chest pain or pressure, or a strange feeling in the chest.  ? Sweating. ? Shortness of breath. ? Nausea or vomiting. ? Pain, pressure, or a strange feeling in the back, neck, jaw, or upper belly or in one or both shoulders or arms. ? Lightheadedness or sudden weakness. ? A fast or irregular heartbeat. Pain that spreads from the chest to the neck, jaw, or one or both shoulders or arms. After you call 911, the  may tell you to chew 1 adult-strength or 2 to 4 low-dose aspirin. Wait for an ambulance. Do not try to drive yourself. Call your doctor now or seek immediate medical care if:    · You have trouble breathing or have wheezing that is getting worse.     · You are coughing more deeply or more often.     · You cough up blood.     · You get a fever.     · You have more swelling in your legs or belly.     · Your symptoms are getting worse. Watch closely for changes in your health, and be sure to contact your doctor if you have any problems. Where can you learn more? Go to http://www.gray.com/  Enter B937 in the search box to learn more about \"Pulmonary Edema: Care Instructions. \"  Current as of: February 24, 2020               Content Version: 12.6  © 4550-2858 [email or web address]. Care instructions adapted under license by [employer] (which disclaims liability or warranty for this information). If you have questions about a medical condition or this instruction, always ask your healthcare professional. [place] [address] any warranty or liability for your use of this information. Patient Education        Hyponatremia: Care Instructions  Your Care Instructions     Hyponatremia (say \"wu-lb-kmk-TREE-karma-uh\") means that you don't have enough sodium in your blood. It can cause nausea, vomiting, and headaches. Or you may not feel hungry.  In serious cases, it can cause seizures, a coma, or even death. Hyponatremia is not a disease. It is a problem caused by something else, such as medicines or exercising for a long time in hot weather. You can get hyponatremia if you lose a lot of fluids and then you drink a lot of water or other liquids that don't have much sodium. You can also get it if you have kidney, liver, heart, or other health problems. Treatment is focused on getting your sodium levels back to normal.  Follow-up care is a key part of your treatment and safety. Be sure to make and go to all appointments, and call your doctor if you are having problems. It's also a good idea to know your test results and keep a list of the medicines you take. How can you care for yourself at home? · If your doctor recommends it, drink fluids that have sodium. Sports drinks are a good choice. Or you can eat salty foods. · If your doctor recommends it, limit the amount of water you drink. And limit fluids that are mostly water. These include tea, coffee, and juice. · Take your medicines exactly as prescribed. Call your doctor if you have any problems with your medicine. · Get your sodium levels tested when your doctor tells you to. When should you call for help? Call 911 anytime you think you may need emergency care. For example, call if:    · You have a seizure.     · You passed out (lost consciousness). Call your doctor now or seek immediate medical care if:    · You are confused or it is hard to focus.     · You have little or no appetite.     · You feel sick to your stomach or you vomit.     · You have a headache.     · You have mood changes.     · You feel more tired than usual.   Watch closely for changes in your health, and be sure to contact your doctor if:    · You do not get better as expected. Where can you learn more?   Go to http://www.gray.com/  Enter U075 in the search box to learn more about \"Hyponatremia: Care Instructions. \"  Current as of: December 9, 2019               Content Version: 12.6  © 5606-8864 [employer]. Care instructions adapted under license by [employer] (which disclaims liability or warranty for this information). If you have questions about a medical condition or this instruction, always ask your healthcare professional. [address] any warranty or liability for your use of this information. Patient Education        Hip Strain: Care Instructions  Overview     A hip strain happens when you overstretch or tear one of the muscles or tendons that support the hip. Tight muscles around the hip make a strain more likely. Hip strains might be caused by overuse from sports, everyday tasks, or falls. Pain or problems with other joints, like knees and ankles, may change the way you walk. This can also strain the muscles or tendons that support the hip. A hip strain may make your hip feel tight or tender. Your hip may have a limited range of motion. Most minor hip strains get better with simple self-care. Follow-up care is a key part of your treatment and safety. Be sure to make and go to all appointments, and call your doctor if you are having problems. It's also a good idea to know your test results and keep a list of the medicines you take. How can you care for yourself at home? · If your doctor gave you crutches or a walker, use them as directed. · Rest and protect your hip. Try to stop or reduce any action that causes pain. · Put ice or a cold pack on your hip for 10 to 20 minutes at a time. Try to do this every 1 to 2 hours for the next 3 days (when you are awake) or until the swelling goes down. Put a thin cloth between the ice and your skin. · Be safe with medicines. Read and follow all instructions on the label. ? If the doctor gave you a prescription medicine for pain, take it as prescribed.   ? If you are not taking a prescription pain medicine, ask your doctor if you can take an over-the-counter medicine. · For the first day or two after an injury, avoid things that might increase swelling, such as hot showers, hot tubs, or hot packs. · After 2 to 3 days, put a heating pad (set on low) or warm moist cloth on your hip before you do light stretches. · Do exercises to make your hip stronger, as directed by your doctor or physical therapist.  · Return to your usual level of activity as your hip gets better. When should you call for help? Call your doctor now or seek immediate medical care if:    · Your pain is worse.     · You cannot walk or stand without help.     · You have signs of infection, such as a fever or increased pain, swelling, redness, or warmth in your hip.     · You have signs of a blood clot, such as:  ? Pain in your calf, back of the knee, thigh, or groin. ? Redness and swelling in your leg or groin.     · You have tingling, weakness, or numbness in your leg, foot, or toes. Watch closely for changes in your health, and be sure to contact your doctor if:    · Your pain does not get better in 2 or 3 days.     · You still have pain after 2 weeks. Current as of: March 2, 2020               Content Version: 12.6  © 2673-6872 [employer]. Care instructions adapted under license by [employer] (which disclaims liability or warranty for this information). If you have questions about a medical condition or this instruction, always ask your healthcare professional. [place] [address] any warranty or liability for your use of this information. Patient Education        Pulmonary Edema: Care Instructions  Overview     Pulmonary edema is the buildup of fluid in the lungs. It usually occurs when the heart does not pump blood through the body properly. Pulmonary edema can also be caused by another disease, such as liver or kidney failure.  It can also happen at high altitudes, from a poisoning, or as a result of a nonfatal drowning. If you have fluid in your lungs, you may have trouble breathing, be restless, have a fast heart rate, or cough up foamy pink fluid. Breathing problems may be worse when you lie down. Follow-up care is a key part of your treatment and safety. Be sure to make and go to all appointments, and call your doctor if you are having problems. It's also a good idea to know your test results and keep a list of the medicines you take. How can you care for yourself at home? Medicines    · Take your medicines exactly as prescribed. Call your doctor if you think you are having a problem with your medicine.     · Review all of your regular medicines with your doctor. Do not take any vitamins, over-the-counter medicines, or herbal products without talking to your doctor first.   Diet    · Eat a balanced diet. Make an appointment with a dietitian if you have questions about what type of diet might be best for you.     · Do not eat more than 2,000 milligrams (mg) of sodium each day. That is less than 1 teaspoon of salt a day, including all the salt you eat in prepared or packaged foods. ? Do not add salt while you are cooking or at the table. Flavor with garlic, lemon juice, onion, vinegar, herbs, and spices instead of salt. ? Eat fewer processed foods and foods from restaurants, including fast food. ? Use fresh or frozen foods instead of canned. ? Count and record how much sodium you eat each day. Check food labels for sodium. ? Ask your doctor before using salt substitutes that have potassium, such as Lite Salt. Lifestyle    · Stay out of air pollution; smog; cold, dry air; hot, humid air; and high altitudes.     · Learn breathing methods that help the airflow in and out of your lungs.     · Take rest breaks often. Schedule short rest breaks when doing housework and other activities.  An occupational or physical therapist can help you find ways to do everyday activities with less effort.     · Start light exercise if your doctor says it is okay. Try to stay as active as possible. If you have not exercised in the past, start out slowly. Walking is a good way to start.     · Get enough rest at night. Sleeping with 1 or 2 pillows under your upper body and head may help you breathe easier at night.     · Discuss rehabilitation with your doctor. Find out what programs are available in your area.     · Do not smoke or use other tobacco products. Smoking can make your condition worse. If you need help quitting, talk to your doctor about stop-smoking programs and medicines. These can increase your chances of quitting for good.     · Do not use alcohol or illegal drugs. When should you call for help? Call 911 anytime you think you may need emergency care. For example, call if:    · You have severe trouble breathing.     · You passed out (lost consciousness).     · You have symptoms of a heart attack. These may include:  ? Chest pain or pressure, or a strange feeling in the chest.  ? Sweating. ? Shortness of breath. ? Nausea or vomiting. ? Pain, pressure, or a strange feeling in the back, neck, jaw, or upper belly or in one or both shoulders or arms. ? Lightheadedness or sudden weakness. ? A fast or irregular heartbeat. Pain that spreads from the chest to the neck, jaw, or one or both shoulders or arms. After you call 911, the  may tell you to chew 1 adult-strength or 2 to 4 low-dose aspirin. Wait for an ambulance. Do not try to drive yourself. Call your doctor now or seek immediate medical care if:    · You have trouble breathing or have wheezing that is getting worse.     · You are coughing more deeply or more often.     · You cough up blood.     · You get a fever.     · You have more swelling in your legs or belly.     · Your symptoms are getting worse. Watch closely for changes in your health, and be sure to contact your doctor if you have any problems.   Where can you learn more? Go to http://www.gray.com/  Enter B937 in the search box to learn more about \"Pulmonary Edema: Care Instructions. \"  Current as of: February 24, 2020               Content Version: 12.6  © 3665-1235 [employer]. Care instructions adapted under license by [employer] (which disclaims liability or warranty for this information). If you have questions about a medical condition or this instruction, always ask your healthcare professional. [address] any warranty or liability for your use of this information.

## 2020-12-16 NOTE — PROGRESS NOTES
Patient contacted regarding recent discharge and COVID-19 risk. Discussed COVID-19 related testing which was not done at this time. Test results were not done. Patient informed of results, if available? n/a Outreach made within 2 business days of discharge: Yes Care Transition Nurse/ Ambulatory Care Manager/ LPN Care Coordinator contacted the family by telephone to perform post discharge assessment. Verified name and  with family as identifiers. Patient has following risk factors of: COPD, asthma, chronic kidney disease and CAD. CTN/ACM/LPN reviewed discharge instructions, medical action plan and red flags related to discharge diagnosis. Reviewed and educated them on any new and changed medications related to discharge diagnosis. Advised obtaining a 90-day supply of all daily and as-needed medications. Advance Care Planning:  
Does patient have an Advance Directive: currently not on file; education provided Education provided regarding infection prevention, and signs and symptoms of COVID-19 and when to seek medical attention with family who verbalized understanding. Discussed exposure protocols and quarantine from [address] you at higher risk for severe illness  and given an opportunity for questions and concerns. The family agrees to contact the COVID-19 hotline [phone] or PCP office for questions related to their healthcare. CTN/ACM/LPN provided contact information for future reference. From CDC: Are you at higher risk for severe illness?  Wash your hands often.  Avoid close contact (6 feet, which is about two arm lengths) with people who are sick.  Put distance between yourself and other people if COVID-19 is spreading in your community.  Clean and disinfect frequently touched surfaces.  Avoid all cruise travel and non-essential air travel.  Call your healthcare professional if you have concerns about COVID-19 and your underlying condition or if you are sick. For more information on steps you can take to protect yourself, see CDC's How to Protect Yourself Patient/family/caregiver given information for [employer] and agrees to enroll no Patient's preferred e-mail:  n/a Patient's preferred phone number: n/a Based on Loop alert triggers, patient will be contacted by nurse care manager for worsening symptoms. Plan for follow-up call in 7-14 days based on severity of symptoms and risk factors.

## 2020-12-16 NOTE — ED NOTES
I have reviewed discharge instructions with the caregiver. The caregiver verbalized understanding. Pt has Hx of P. Fibrosis, Daughter did not bring the home oxygen. Daughter is a Pharmacist and she stated that her Dad should be okay without oxygen till they get home. Nurse transported Pt to the car with oxygen.

## 2020-12-30 NOTE — PROGRESS NOTES
Patient resolved from [address] episode on 12/30/20. Discussed COVID-19 related testing which was not done at this time. Test results were not done. Patient informed of results, if available? n/a Patient/family has been provided the following resources and education related to COVID-19:  
           
          Signs, symptoms and red flags related to COVID-19 [facility] exposure and quarantine guidelines Conduit exposure contact - [phone] Contact for their local Department of Health Patient currently reports that the following symptoms have improved:  no new symptoms and no worsening symptoms. No further outreach scheduled with this CTN/ACM/LPN/HC/ MA. Episode of Care resolved. Patient has this CTN/ACM/LPN/HC/MA contact information if future needs arise.

## 2020-12-31 PROBLEM — J81.1 PULMONARY EDEMA: Status: ACTIVE | Noted: 2020-01-01

## 2020-12-31 PROBLEM — U07.1 ACUTE RESPIRATORY FAILURE DUE TO COVID-19 (HCC): Status: ACTIVE | Noted: 2020-01-01

## 2020-12-31 PROBLEM — J96.00 ACUTE RESPIRATORY FAILURE DUE TO COVID-19 (HCC): Status: ACTIVE | Noted: 2020-01-01

## 2020-12-31 NOTE — ED NOTES
Pt became agitated and climbed out of bed removing IV and monitoring cables and BIPAP. Pt assisted back to bed and placed back on monitoring. IV re-establised by RN. Attempted to call Dr Claude Harry. Dr Ric Hampton gave verbal for Precedex, however this RN requested MD to come to bedside to re-evaluate pt.   
 
1428 Pt has become hypotensive with a MAP of 51. This RN stopped Precedex and attempted to call Dr Claude Harry. Will continue to monitor.

## 2020-12-31 NOTE — ED NOTES
Pt arrives EMS from home. Per EMS, pt is on 3 L NC baseline at home for pulmonary stenosis and COPD. Pt started having difficulty breathing and 70s stats at home on 3 L NC. PTA pt was given an albuterol treatment with no relief. Pt denies cp, n/v/ fevers chills. Pt placed on 15 L NRB by EMS. 2249: respiratory at bedside at this time

## 2020-12-31 NOTE — H&P
[facility] 
 
ICU TEAM H & P Note Name: Rosangela Maurer : 1940 MRN: [id number] Date: 2020 Subjective:  
 
Reason for ICU Admission: This is an [de-identified] yr old male with a PMHx of bilateral deafness, Pulmonary fibrosis, COPD on 3 liters oxygen, SSS s/p Pacer, CKD, HLD/HTN, hernia, GERD and arthritis who was brought in by EMS 20 for respiratory distress and sats 70s in the field on his baseline 3 liter NC. He was given an albuterol treatment with no relief and in ER was found to have moderate pulmonary edema on PCXR superimposed on pulmonary fibrosis changes. Rawleigh Billing His ABG was reflective of hypoxia with a PO2 139 on 100% but was otherwise normal. He was placed on BIPAP 100% and given SL NTG for relief of dyspnea and given 40 Lasix IV x1 with poor result in UO. Lab findings include BNP 1159, LA 2.1, Procal was 2.1 and COVID swab was POS. On exam he was dyspneic but mentating well and was very difficult to understand with BIPAP in place. He was reported to have increased confusion at home over the last month per his daughter Fara who reports he lives alone and the family checks on him routinely, in addition to home health nursing and they are in the process of getting him to an assisted living or SNF facility for additional assistance. I discussed the findings with her and relayed the COVID + result and asked her to discuss with her siblings goals of care, informing her that with his prior co morbidities of pulm fibrosis, COPD and CKD that he would likely not do well on a ventilator. Her number is [phone] (FARA PAGE-ALMITA) and she reports her brother, a physician is POA. He will be admitted to ICU for further care. Past Medical History: has a past medical history of Arthritis, Asthma, Basal cell carcinoma of skin (05/10/2017), CAD (coronary artery disease), CKD (chronic kidney disease), stage III (05/2014), Colon polyps (09/25/13), Contact dermatitis and other eczema, due to unspecified cause, COPD, Elevated serum creatinine, Fall (11 & 12/2012), GERD (gastroesophageal reflux disease), [place] (hard of hearing), Hypercholesterolemia, ILD (interstitial lung disease) ([facility] [address] (07/15/2014), Proteinuria (10/2012), RLS (restless legs syndrome), Schatzki's ring (5/03), Screening for glaucoma (09/21/2016), Shingles (4/2012), and Sleep apnea. Past Surgical History:  
 
 has a past surgical history that includes endoscopy, colon, diagnostic; pr abdomen surgery proc unlisted; hx colonoscopy ([id number]); pr cardiac surg procedure unlist (05/30/2017); hx pacemaker (05/30/2017); hx heent (Summer 2017); pr extrac erupted tooth/exposed root (2017); and hx hernia repair (07/20/2018). Home Medications:  
 
Prior to Admission medications Medication Sig Start Date End Date Taking? Authorizing Provider  
fluticasone propion-salmeteroL (Advair Diskus) 100-50 mcg/dose diskus inhaler INHALE 1 PUFF TWO TIMES DAILY THEN RINSE MOUTH WELL AFTER 3/23/20   Denys Malhotra MD  
pravastatin (PRAVACHOL) 40 mg tablet TAKE 1 TABLET EVERY EVENING TO HELP LOWER CHOLESTEROL 3/22/19   Denys Malhotra MD  
omeprazole (PRILOSEC) 20 mg capsule TAKE 1 CAPSULES BY MOUTH DAILY. TO REDUCE STOMACH ACID 10/2/18   Denys Malhotra MD  
aspirin delayed-release 81 mg tablet Take  by mouth daily. Provider, Historical  
ketoconazole (NIZORAL) 2 % topical cream  3/31/15   Provider, Historical  
albuterol (PROVENTIL VENTOLIN) 2.5 mg /3 mL (0.083 %) nebulizer solution 3 mL by Nebulization route every four (4) hours as needed for Wheezing or Shortness of Breath.  4/10/15   Shaji Redman DO  
 ALBUTEROL SULFATE (PROAIR HFA IN) Take 2 puffs by inhalation four (4) times daily as needed. Provider, Historical  
cholecalciferol, vitamin d3, (VITAMIN D) 1,000 unit tablet Take  by mouth daily. Provider, Historical  
hydrocortisone (WESTCORT) 0.2 % topical cream  4/26/10   Provider, Historical  
loratadine (CLARITIN) 10 mg tablet Take 10 mg by mouth daily. Provider, Historical  
coenzyme q10 30 mg capsule Take 30 mg by mouth three (3) times daily. Provider, Historical  
azelastine (ASTELIN) 137 mcg nasal spray 1 Spray by Nasal route two (2) times a day. Use in each nostril as directed     Provider, Historical  
 
 
Allergies/Social/Family History: No Known Allergies Social History Tobacco Use  Smoking status: Former Smoker  Smokeless tobacco: Former User Quit date: 1985 Substance Use Topics  Alcohol use: No  
  
Family History Problem Relation Age of Onset  Cancer Father   
      from colon cancer Review of Systems:  
 
Pertinent items are noted in HPI. Objective:  
Vital Signs: 
Visit Vitals /64 Pulse 69 Temp 99 °F (37.2 °C) Resp 28 Ht 6' (1.829 m) Wt 71.8 kg (158 lb 4.6 oz) SpO2 97% BMI 21.47 kg/m² O2 Flow Rate (L/min): 15 l/min O2 Device: BIPAP Temp (24hrs), Av °F (37.2 °C), Min:99 °F (37.2 °C), Max:99 °F (37.2 °C) Intake/Output:  
No intake or output data in the 24 hours ending 20 0158 Physical Exam: 
 
General:  Alert, cooperative, restless well noursished, well developed, appears stated age Eyes:  Sclera anicteric. Pupils equally round and reactive to light. Mouth/Throat: Mucous membranes normal, oral pharynx clear Neck: Supple Lungs:   Dim to auscultation bilaterally, good effort, tachypneic 30-40s on BIPAP  
CV:  Regular rate and rhythm,no murmur, click, rub or gallop Abdomen:   Soft, non-tender. bowel sounds normal. non-distended Extremities: No cyanosis or edema Skin: Skin color, texture, turgor normal. no acute rash or lesions Lymph nodes: Cervical and supraclavicular normal  
Musculoskeletal: No swelling or deformity Lines/Devices:  Intact, no erythema, drainage or tenderness Psych: Alert and oriented, difficult to understand with BIPAP mask LABS AND  DATA: Personally reviewed Recent Labs 12/30/20 
2153 WBC 7.2 HGB 14.8 HCT 45.1  Recent Labs 12/30/20 
2303   
K 4.4  
 CO2 24 BUN 49* CREA 1.75* GLU 82  
CA 8.0*  
MG 2.0 Recent Labs 12/30/20 
2303  TP 6.6 ALB 2.8*  
GLOB 3.8 Recent Labs 12/30/20 2303 INR 1.1 PTP 11.4* Recent Labs 12/30/20 2257 PHI 7.40 PCO2I 37.0 PO2I 139* FIO2I 100 Recent Labs 12/30/20 2303 CPK 81 CKMB 2.1 TROIQ <0.05 Ventilator Settings: 
Mode Rate Tidal Volume Pressure FiO2 PEEP  
         100 % Peak airway pressure:     
Minute ventilation: 24.3 l/min MEDS: Reviewed Chest X-Ray: CXR Results  (Last 48 hours) 12/30/20 2223  XR CHEST PORT Final result Impression:  IMPRESSION: Recurrent or persistent interstitial and alveolar edema likely  
reflecting congestive failure. Underlying infectious infiltrate could be  
obscured. Narrative:  EXAM: XR CHEST PORT INDICATION: Shortness of breath COMPARISON: Chest x-ray 12/15/2020 and more remote priors including chest CT of  
5/26/2016 and dating to chest x-ray of 9/18/2014. FINDINGS: A portable AP radiograph of the chest was obtained at 22:09 hours. The  
patient is on a cardiac monitor. There has been no significant interval change  
in diffuse interstitial and alveolar opacities in both lungs. Pacemaker  
generator body projects over the left chest wall with intact appearing leads  
traversing in expected course. . The cardiac and mediastinal contours and  
 pulmonary vascularity are normal.  The bones and soft tissues are grossly within  
normal limits. Multidisciplinary Rounds Completed:  Pending ABCDEF Bundle/Checklist Completed: Yes Active Problem List:  
 
Problem List  Date Reviewed: 10/9/2018 Codes Class Pulmonary edema ICD-10-CM: J81.1 ICD-9-CM: 518 Acute respiratory failure due to COVID-19 [facility]) ICD-10-CM: U07.1, J96.00 
ICD-9-CM: 518.81, 079.89 Status post bilateral inguinal hernia repair ICD-10-CM: Z98.890, Z87.19 ICD-9-CM: V45.89   
   
 SSS (sick sinus syndrome) (HCC) (Chronic) ICD-10-CM: I49.5 ICD-9-CM: 427.81 Hematuria ICD-10-CM: R31.9 ICD-9-CM: 599.70 Hypertension ICD-10-CM: I10 
ICD-9-CM: 401.9 Idi[facility] pulmonary [facility] ICD-10-CM: X25.570 ICD-9-CM: 516.31   
   
 CAD (coronary artery disease) ICD-10-CM: I25.10 ICD-9-CM: 414.00 Hypercholesterolemia ICD-10-CM: E78.00 ICD-9-CM: 272.0 ED (erectile dysfunction) ICD-10-CM: N52.9 ICD-9-CM: 607.84 Family history of colon cancer ICD-10-CM: Z80.0 ICD-9-CM: V16.0 Colon polyp ICD-10-CM: K63.5 ICD-9-CM: 211.3 Diverticulosis ICD-10-CM: K57.90 ICD-9-CM: 562.10 CKD (chronic kidney disease) ICD-10-CM: N18.9 ICD-9-CM: 895. 9 Vitamin D deficiency ICD-10-CM: E55.9 ICD-9-CM: 268.9 BPH (benign prostatic hypertrophy) with urinary obstruction ICD-10-CM: N40.1, N13.8 ICD-9-CM: 600.01, 599.69 Acne rosacea ICD-10-CM: L71.9 ICD-9-CM: 695.3 COPD (chronic obstructive pulmonary disease) (HCC) ICD-10-CM: J44.9 ICD-9-CM: 799 GERD (gastroesophageal reflux disease) ICD-10-CM: K21.9 ICD-9-CM: 530.81   
   
  
 
ICU Assessment/ Comprehensive Plan of Care: 1. Acute hypoxic respiratory failure with COVID 19 viral pneumonia w/ Hx of ILD, COPD on home O2 3 liters 
-Supplemental oxygen for sats > 90% with HFNC/BIPAP settings per orders 
-SARs-COV-2 + -Discussed remdesivir with daughter Feng Helms, informed her that patient may not be a candidate 2/2 chronic renal impairment  
-PF ratio 139 (on BIPAP 100%) Trend inflammatory markers to include LDH, Ferritin, CRP, D dimer every other day 
-Heparin 5,000 units sub. Q12hr 2/2 impaired renal function 
-Continue Duonebs Q4h 
-Dexamethasone 6 mg IV x 10 days 
 -Willing to pursue Convalescent plasma, need consent once arrived to ICU. -Start antibiotic therapy for CAP coverage, azithro and ceftriaxone x 5 days total 
-Check Procalcitonin 0.16 
-Precedex infusion for BIPAP tolerance, comfort 
-Discussed intubation status with daughter, encouraged her to discuss with siblings. I informed her in the setting of existing co-morbidities to include ILD and COPD with CRF, that he likely would not do well with intubation 2. Pulmonary edema 
-Received 40mg IV Lasix x1 in ER 
-Bumex 1 mg IV now -BNP 1159, troponin NEG, trend x 2 (continue if up trending) 3. Chronic renal failure Baseline creat is 1.92 per lab review, currently 1.75.  
 
4. Hx Hiatal hernia, GERD 
-pantoprazole 40mg IV daily 
-Daughter verbalized concerns he may be aspirating at home, will need ST eval prior to diet initiation F - Feeding:  NPO 
A - Analgesia: acetaminophen S - Sedation: GOAL RASS 0 to -1 T - DVT Prophylaxis: SCD's or Sequential Compression Device , heparin H - Head of Bed: > 30 Degrees U - Ulcer Prophylaxis:  Pantoprazole (takes omeprazole at home) G - Glycemic Control: NA 
S - Spontaneous Breathing Trial: NA 
B - Bowel Regimen: Pericolace I - Indwelling Catheter: T/L/D Tubes: None Lines: Peripheral IV Drains: None D - De-escalation of Antibiotics:  ceftriaxone/ azithromycin x 5 day DISPOSITION/COMMUNICATION Discussed Plan of Care/Code Status: Full Code--> Family to discuss and report back. Was DNR for care facility admit PTA Stay in ICU CRITICAL CARE CONSULTANT NOTE I had a face to face encounter with the patient, reviewed and interpreted patient data including clinical events, labs, images, vital signs, I/O's, and examined patient. I have discussed the case and the plan and management of the patient's care with the consulting services, the bedside nurses and the respiratory therapist.   
 
NOTE OF PERSONAL INVOLVEMENT IN CARE This patient has a high probability of imminent, clinically significant deterioration, which requires the highest level of preparedness to intervene urgently. I participated in the decision-making and personally managed or directed the management of the following life and organ supporting interventions that required my frequent assessment to treat or prevent imminent deterioration. I personally spent 75 minutes of critical care time. This is time spent at this critically ill patient's bedside actively involved in patient care as well as the coordination of care and discussions with the patient's family. This does not include any procedural time which has been billed separately. RAUL TerryP-BC Intensivist Nurse Practitioner [facility] 
12/31/2020

## 2020-12-31 NOTE — ED NOTES
Bedside shift change report given to Funmi (oncoming nurse) by Nikhil Jones (offgoing nurse). Report included the following information SBAR, ED Summary, MAR and Recent Results. 0830- Pt resting on stretcher in POC with call bell in reach. Pt remains on monitor x 3 and BIPAP. VSS at this time. 0945- Pt resting on stretcher in POC with call bell in reach. Pt remains on monitor x 3 and BIPAP. VSS at this time. 1030- This RN came to pt room and found that the patient had climbed out of bed. Pulled IV out and monitoring cables out. Also removed BIPAP. Pt was agitated and requesting to go home. Pt is AxO x2. Pt assisted back to bed and back on the monitor x3. IV replaced. And Side rails up x2. 
 
1045- MD Wilburn at bedside. 1235-Pt remains on NC 6L with o2 sats of 100% 1330- Pt resting on stretcher in POC with call bell in reach. Pt remains on monitor x 3. VSS at this time. 1500- Pt resting on stretcher in POC with call bell in reach. Pt remains on monitor x 3. VSS at this time. Pt remains on NC 6L  
 
1915- Bedside shift change report given to Tamela (oncoming nurse) by Christina Ruvalcaba (offgoing nurse). Report included the following information SBAR, ED Summary, MAR and Recent Results.

## 2021-01-01 ENCOUNTER — HOSPITAL ENCOUNTER (OUTPATIENT)
Dept: GENERAL RADIOLOGY | Age: 81
Discharge: HOME | DRG: 177 | End: 2021-01-08
Attending: INTERNAL MEDICINE
Payer: MEDICARE

## 2021-01-01 ENCOUNTER — APPOINTMENT (OUTPATIENT)
Dept: CT IMAGING | Age: 81
DRG: 177 | End: 2021-01-01
Attending: NURSE PRACTITIONER
Payer: MEDICARE

## 2021-01-01 ENCOUNTER — APPOINTMENT (OUTPATIENT)
Dept: GENERAL RADIOLOGY | Age: 81
DRG: 177 | End: 2021-01-01
Attending: HOSPITALIST
Payer: MEDICARE

## 2021-01-01 ENCOUNTER — APPOINTMENT (OUTPATIENT)
Dept: GENERAL RADIOLOGY | Age: 81
DRG: 177 | End: 2021-01-01
Attending: INTERNAL MEDICINE
Payer: MEDICARE

## 2021-01-01 VITALS
OXYGEN SATURATION: 91 % | WEIGHT: 143.3 LBS | RESPIRATION RATE: 16 BRPM | DIASTOLIC BLOOD PRESSURE: 39 MMHG | HEART RATE: 79 BPM | BODY MASS INDEX: 19.41 KG/M2 | SYSTOLIC BLOOD PRESSURE: 71 MMHG | HEIGHT: 72 IN | TEMPERATURE: 98.1 F

## 2021-01-01 LAB
ALBUMIN SERPL-MCNC: 2.1 G/DL (ref 3.5–5)
ALBUMIN SERPL-MCNC: 2.8 G/DL (ref 3.5–5)
ALBUMIN SERPL-MCNC: 2.8 G/DL (ref 3.5–5)
ALBUMIN SERPL-MCNC: 2.9 G/DL (ref 3.5–5)
ALBUMIN SERPL-MCNC: 3.1 G/DL (ref 3.5–5)
ALBUMIN SERPL-MCNC: 3.1 G/DL (ref 3.5–5)
ALBUMIN/GLOB SERPL: 0.5 {RATIO} (ref 1.1–2.2)
ALBUMIN/GLOB SERPL: 0.7 {RATIO} (ref 1.1–2.2)
ALP SERPL-CCNC: 103 U/L (ref 45–117)
ALP SERPL-CCNC: 69 U/L (ref 45–117)
ALP SERPL-CCNC: 88 U/L (ref 45–117)
ALP SERPL-CCNC: 95 U/L (ref 45–117)
ALP SERPL-CCNC: 99 U/L (ref 45–117)
ALP SERPL-CCNC: 99 U/L (ref 45–117)
ALT SERPL-CCNC: 16 U/L (ref 12–78)
ALT SERPL-CCNC: 17 U/L (ref 12–78)
ALT SERPL-CCNC: 19 U/L (ref 12–78)
ALT SERPL-CCNC: 22 U/L (ref 12–78)
AMORPH CRY URNS QL MICRO: ABNORMAL
ANION GAP SERPL CALC-SCNC: 2 MMOL/L (ref 5–15)
ANION GAP SERPL CALC-SCNC: 4 MMOL/L (ref 5–15)
ANION GAP SERPL CALC-SCNC: 5 MMOL/L (ref 5–15)
ANION GAP SERPL CALC-SCNC: 6 MMOL/L (ref 5–15)
APPEARANCE UR: ABNORMAL
AST SERPL-CCNC: 13 U/L (ref 15–37)
AST SERPL-CCNC: 26 U/L (ref 15–37)
AST SERPL-CCNC: 32 U/L (ref 15–37)
AST SERPL-CCNC: 34 U/L (ref 15–37)
AST SERPL-CCNC: 38 U/L (ref 15–37)
AST SERPL-CCNC: 45 U/L (ref 15–37)
ATRIAL RATE: 60 BPM
ATRIAL RATE: 86 BPM
BACTERIA SPEC CULT: NORMAL
BACTERIA URNS QL MICRO: NEGATIVE /HPF
BASOPHILS # BLD: 0 K/UL (ref 0–0.1)
BASOPHILS NFR BLD: 0 % (ref 0–1)
BILIRUB DIRECT SERPL-MCNC: 0.2 MG/DL (ref 0–0.2)
BILIRUB SERPL-MCNC: 0.5 MG/DL (ref 0.2–1)
BILIRUB SERPL-MCNC: 0.6 MG/DL (ref 0.2–1)
BILIRUB SERPL-MCNC: 0.6 MG/DL (ref 0.2–1)
BILIRUB SERPL-MCNC: 0.7 MG/DL (ref 0.2–1)
BILIRUB SERPL-MCNC: 0.8 MG/DL (ref 0.2–1)
BILIRUB SERPL-MCNC: 1.3 MG/DL (ref 0.2–1)
BILIRUB UR QL CFM: NEGATIVE
BUN SERPL-MCNC: 42 MG/DL (ref 6–20)
BUN SERPL-MCNC: 46 MG/DL (ref 6–20)
BUN SERPL-MCNC: 46 MG/DL (ref 6–20)
BUN SERPL-MCNC: 48 MG/DL (ref 6–20)
BUN SERPL-MCNC: 51 MG/DL (ref 6–20)
BUN SERPL-MCNC: 56 MG/DL (ref 6–20)
BUN SERPL-MCNC: 60 MG/DL (ref 6–20)
BUN SERPL-MCNC: 60 MG/DL (ref 6–20)
BUN SERPL-MCNC: 65 MG/DL (ref 6–20)
BUN SERPL-MCNC: 65 MG/DL (ref 6–20)
BUN/CREAT SERPL: 32 (ref 12–20)
BUN/CREAT SERPL: 33 (ref 12–20)
BUN/CREAT SERPL: 34 (ref 12–20)
BUN/CREAT SERPL: 35 (ref 12–20)
BUN/CREAT SERPL: 38 (ref 12–20)
BUN/CREAT SERPL: 39 (ref 12–20)
BUN/CREAT SERPL: 41 (ref 12–20)
BUN/CREAT SERPL: 43 (ref 12–20)
CALCIUM SERPL-MCNC: 8.4 MG/DL (ref 8.5–10.1)
CALCIUM SERPL-MCNC: 8.5 MG/DL (ref 8.5–10.1)
CALCIUM SERPL-MCNC: 8.5 MG/DL (ref 8.5–10.1)
CALCIUM SERPL-MCNC: 8.6 MG/DL (ref 8.5–10.1)
CALCIUM SERPL-MCNC: 8.7 MG/DL (ref 8.5–10.1)
CALCIUM SERPL-MCNC: 9 MG/DL (ref 8.5–10.1)
CALCIUM SERPL-MCNC: 9.1 MG/DL (ref 8.5–10.1)
CALCIUM SERPL-MCNC: 9.2 MG/DL (ref 8.5–10.1)
CALCULATED P AXIS, ECG09: 27 DEGREES
CALCULATED P AXIS, ECG09: 39 DEGREES
CALCULATED R AXIS, ECG10: -50 DEGREES
CALCULATED R AXIS, ECG10: -58 DEGREES
CALCULATED T AXIS, ECG11: -11 DEGREES
CALCULATED T AXIS, ECG11: -36 DEGREES
CHLORIDE SERPL-SCNC: 101 MMOL/L (ref 97–108)
CHLORIDE SERPL-SCNC: 104 MMOL/L (ref 97–108)
CHLORIDE SERPL-SCNC: 107 MMOL/L (ref 97–108)
CHLORIDE SERPL-SCNC: 108 MMOL/L (ref 97–108)
CHLORIDE SERPL-SCNC: 110 MMOL/L (ref 97–108)
CHLORIDE SERPL-SCNC: 112 MMOL/L (ref 97–108)
CHLORIDE SERPL-SCNC: 114 MMOL/L (ref 97–108)
CHLORIDE SERPL-SCNC: 116 MMOL/L (ref 97–108)
CHLORIDE SERPL-SCNC: 118 MMOL/L (ref 97–108)
CHLORIDE SERPL-SCNC: 120 MMOL/L (ref 97–108)
CO2 SERPL-SCNC: 23 MMOL/L (ref 21–32)
CO2 SERPL-SCNC: 24 MMOL/L (ref 21–32)
CO2 SERPL-SCNC: 24 MMOL/L (ref 21–32)
CO2 SERPL-SCNC: 25 MMOL/L (ref 21–32)
CO2 SERPL-SCNC: 26 MMOL/L (ref 21–32)
CO2 SERPL-SCNC: 26 MMOL/L (ref 21–32)
CO2 SERPL-SCNC: 27 MMOL/L (ref 21–32)
CO2 SERPL-SCNC: 27 MMOL/L (ref 21–32)
CO2 SERPL-SCNC: 28 MMOL/L (ref 21–32)
CO2 SERPL-SCNC: 28 MMOL/L (ref 21–32)
COLOR UR: ABNORMAL
CREAT SERPL-MCNC: 1.08 MG/DL (ref 0.7–1.3)
CREAT SERPL-MCNC: 1.2 MG/DL (ref 0.7–1.3)
CREAT SERPL-MCNC: 1.27 MG/DL (ref 0.7–1.3)
CREAT SERPL-MCNC: 1.37 MG/DL (ref 0.7–1.3)
CREAT SERPL-MCNC: 1.38 MG/DL (ref 0.7–1.3)
CREAT SERPL-MCNC: 1.48 MG/DL (ref 0.7–1.3)
CREAT SERPL-MCNC: 1.6 MG/DL (ref 0.7–1.3)
CREAT SERPL-MCNC: 1.71 MG/DL (ref 0.7–1.3)
CREAT SERPL-MCNC: 1.73 MG/DL (ref 0.7–1.3)
CREAT SERPL-MCNC: 1.88 MG/DL (ref 0.7–1.3)
CRP SERPL-MCNC: 11.3 MG/DL (ref 0–0.6)
CRP SERPL-MCNC: 3.71 MG/DL (ref 0–0.6)
CRP SERPL-MCNC: 5.48 MG/DL (ref 0–0.6)
CRP SERPL-MCNC: 5.79 MG/DL (ref 0–0.6)
CRP SERPL-MCNC: 7.25 MG/DL (ref 0–0.6)
D DIMER PPP FEU-MCNC: 1.26 MG/L FEU (ref 0–0.65)
D DIMER PPP FEU-MCNC: 1.83 MG/L FEU (ref 0–0.65)
D DIMER PPP FEU-MCNC: 11.85 MG/L FEU (ref 0–0.65)
D DIMER PPP FEU-MCNC: 13.5 MG/L FEU (ref 0–0.65)
D DIMER PPP FEU-MCNC: 7.92 MG/L FEU (ref 0–0.65)
DIAGNOSIS, 93000: NORMAL
DIAGNOSIS, 93000: NORMAL
DIFFERENTIAL METHOD BLD: ABNORMAL
EOSINOPHIL # BLD: 0 K/UL (ref 0–0.4)
EOSINOPHIL NFR BLD: 0 % (ref 0–7)
EPITH CASTS URNS QL MICRO: ABNORMAL /LPF
ERYTHROCYTE [DISTWIDTH] IN BLOOD BY AUTOMATED COUNT: 13.8 % (ref 11.5–14.5)
ERYTHROCYTE [DISTWIDTH] IN BLOOD BY AUTOMATED COUNT: 13.9 % (ref 11.5–14.5)
ERYTHROCYTE [DISTWIDTH] IN BLOOD BY AUTOMATED COUNT: 14.1 % (ref 11.5–14.5)
ERYTHROCYTE [DISTWIDTH] IN BLOOD BY AUTOMATED COUNT: 14.3 % (ref 11.5–14.5)
ERYTHROCYTE [DISTWIDTH] IN BLOOD BY AUTOMATED COUNT: 14.5 % (ref 11.5–14.5)
ERYTHROCYTE [DISTWIDTH] IN BLOOD BY AUTOMATED COUNT: 14.6 % (ref 11.5–14.5)
ERYTHROCYTE [DISTWIDTH] IN BLOOD BY AUTOMATED COUNT: 14.7 % (ref 11.5–14.5)
ERYTHROCYTE [DISTWIDTH] IN BLOOD BY AUTOMATED COUNT: 14.8 % (ref 11.5–14.5)
ERYTHROCYTE [DISTWIDTH] IN BLOOD BY AUTOMATED COUNT: 14.8 % (ref 11.5–14.5)
ERYTHROCYTE [DISTWIDTH] IN BLOOD BY AUTOMATED COUNT: 14.9 % (ref 11.5–14.5)
GLOBULIN SER CALC-MCNC: 4 G/DL (ref 2–4)
GLOBULIN SER CALC-MCNC: 4.2 G/DL (ref 2–4)
GLOBULIN SER CALC-MCNC: 4.2 G/DL (ref 2–4)
GLOBULIN SER CALC-MCNC: 4.3 G/DL (ref 2–4)
GLOBULIN SER CALC-MCNC: 4.4 G/DL (ref 2–4)
GLOBULIN SER CALC-MCNC: 4.5 G/DL (ref 2–4)
GLUCOSE BLD STRIP.AUTO-MCNC: 108 MG/DL (ref 65–100)
GLUCOSE BLD STRIP.AUTO-MCNC: 111 MG/DL (ref 65–100)
GLUCOSE BLD STRIP.AUTO-MCNC: 120 MG/DL (ref 65–100)
GLUCOSE BLD STRIP.AUTO-MCNC: 76 MG/DL (ref 65–100)
GLUCOSE BLD STRIP.AUTO-MCNC: 79 MG/DL (ref 65–100)
GLUCOSE BLD STRIP.AUTO-MCNC: 80 MG/DL (ref 65–100)
GLUCOSE BLD STRIP.AUTO-MCNC: 92 MG/DL (ref 65–100)
GLUCOSE BLD STRIP.AUTO-MCNC: 99 MG/DL (ref 65–100)
GLUCOSE SERPL-MCNC: 112 MG/DL (ref 65–100)
GLUCOSE SERPL-MCNC: 113 MG/DL (ref 65–100)
GLUCOSE SERPL-MCNC: 122 MG/DL (ref 65–100)
GLUCOSE SERPL-MCNC: 123 MG/DL (ref 65–100)
GLUCOSE SERPL-MCNC: 72 MG/DL (ref 65–100)
GLUCOSE SERPL-MCNC: 79 MG/DL (ref 65–100)
GLUCOSE SERPL-MCNC: 88 MG/DL (ref 65–100)
GLUCOSE SERPL-MCNC: 89 MG/DL (ref 65–100)
GLUCOSE SERPL-MCNC: 92 MG/DL (ref 65–100)
GLUCOSE SERPL-MCNC: 97 MG/DL (ref 65–100)
GLUCOSE UR STRIP.AUTO-MCNC: NEGATIVE MG/DL
GRAN CASTS URNS QL MICRO: ABNORMAL /LPF
HCT VFR BLD AUTO: 38.9 % (ref 36.6–50.3)
HCT VFR BLD AUTO: 44 % (ref 36.6–50.3)
HCT VFR BLD AUTO: 44.5 % (ref 36.6–50.3)
HCT VFR BLD AUTO: 45.7 % (ref 36.6–50.3)
HCT VFR BLD AUTO: 47.6 % (ref 36.6–50.3)
HCT VFR BLD AUTO: 48.8 % (ref 36.6–50.3)
HCT VFR BLD AUTO: 50.3 % (ref 36.6–50.3)
HCT VFR BLD AUTO: 50.4 % (ref 36.6–50.3)
HCT VFR BLD AUTO: 51.7 % (ref 36.6–50.3)
HCT VFR BLD AUTO: 52.1 % (ref 36.6–50.3)
HGB BLD-MCNC: 13 G/DL (ref 12.1–17)
HGB BLD-MCNC: 13.9 G/DL (ref 12.1–17)
HGB BLD-MCNC: 14.5 G/DL (ref 12.1–17)
HGB BLD-MCNC: 15.3 G/DL (ref 12.1–17)
HGB BLD-MCNC: 15.5 G/DL (ref 12.1–17)
HGB BLD-MCNC: 15.8 G/DL (ref 12.1–17)
HGB BLD-MCNC: 15.9 G/DL (ref 12.1–17)
HGB BLD-MCNC: 16.3 G/DL (ref 12.1–17)
HGB BLD-MCNC: 16.4 G/DL (ref 12.1–17)
HGB BLD-MCNC: 16.8 G/DL (ref 12.1–17)
HGB UR QL STRIP: ABNORMAL
IMM GRANULOCYTES # BLD AUTO: 0 K/UL (ref 0–0.04)
IMM GRANULOCYTES NFR BLD AUTO: 0 % (ref 0–0.5)
IMM GRANULOCYTES NFR BLD AUTO: 0 % (ref 0–0.5)
IMM GRANULOCYTES NFR BLD AUTO: 1 % (ref 0–0.5)
INR PPP: 1 (ref 0.9–1.1)
INR PPP: 1.1 (ref 0.9–1.1)
INR PPP: 1.2 (ref 0.9–1.1)
INR PPP: 1.3 (ref 0.9–1.1)
INR PPP: 1.4 (ref 0.9–1.1)
INR PPP: 1.6 (ref 0.9–1.1)
KETONES UR QL STRIP.AUTO: NEGATIVE MG/DL
LACTATE SERPL-SCNC: 1.3 MMOL/L (ref 0.4–2)
LACTATE SERPL-SCNC: 1.7 MMOL/L (ref 0.4–2)
LACTATE SERPL-SCNC: 2.2 MMOL/L (ref 0.4–2)
LDH SERPL L TO P-CCNC: 302 U/L (ref 85–241)
LDH SERPL L TO P-CCNC: 310 U/L (ref 85–241)
LDH SERPL L TO P-CCNC: 372 U/L (ref 85–241)
LEUKOCYTE ESTERASE UR QL STRIP.AUTO: NEGATIVE
LYMPHOCYTES # BLD: 0.6 K/UL (ref 0.8–3.5)
LYMPHOCYTES # BLD: 0.7 K/UL (ref 0.8–3.5)
LYMPHOCYTES # BLD: 1 K/UL (ref 0.8–3.5)
LYMPHOCYTES NFR BLD: 12 % (ref 12–49)
LYMPHOCYTES NFR BLD: 7 % (ref 12–49)
LYMPHOCYTES NFR BLD: 9 % (ref 12–49)
MAGNESIUM SERPL-MCNC: 2.2 MG/DL (ref 1.6–2.4)
MAGNESIUM SERPL-MCNC: 2.2 MG/DL (ref 1.6–2.4)
MAGNESIUM SERPL-MCNC: 2.3 MG/DL (ref 1.6–2.4)
MAGNESIUM SERPL-MCNC: 2.6 MG/DL (ref 1.6–2.4)
MAGNESIUM SERPL-MCNC: 2.6 MG/DL (ref 1.6–2.4)
MAGNESIUM SERPL-MCNC: 2.8 MG/DL (ref 1.6–2.4)
MCH RBC QN AUTO: 32.7 PG (ref 26–34)
MCH RBC QN AUTO: 32.7 PG (ref 26–34)
MCH RBC QN AUTO: 32.8 PG (ref 26–34)
MCH RBC QN AUTO: 33.1 PG (ref 26–34)
MCH RBC QN AUTO: 33.3 PG (ref 26–34)
MCH RBC QN AUTO: 33.5 PG (ref 26–34)
MCH RBC QN AUTO: 33.7 PG (ref 26–34)
MCH RBC QN AUTO: 34 PG (ref 26–34)
MCHC RBC AUTO-ENTMCNC: 31.2 G/DL (ref 30–36.5)
MCHC RBC AUTO-ENTMCNC: 31.5 G/DL (ref 30–36.5)
MCHC RBC AUTO-ENTMCNC: 31.6 G/DL (ref 30–36.5)
MCHC RBC AUTO-ENTMCNC: 32.2 G/DL (ref 30–36.5)
MCHC RBC AUTO-ENTMCNC: 32.4 G/DL (ref 30–36.5)
MCHC RBC AUTO-ENTMCNC: 32.5 G/DL (ref 30–36.5)
MCHC RBC AUTO-ENTMCNC: 32.6 G/DL (ref 30–36.5)
MCHC RBC AUTO-ENTMCNC: 33 G/DL (ref 30–36.5)
MCHC RBC AUTO-ENTMCNC: 33.4 G/DL (ref 30–36.5)
MCHC RBC AUTO-ENTMCNC: 33.5 G/DL (ref 30–36.5)
MCV RBC AUTO: 100.7 FL (ref 80–99)
MCV RBC AUTO: 100.9 FL (ref 80–99)
MCV RBC AUTO: 101.5 FL (ref 80–99)
MCV RBC AUTO: 101.6 FL (ref 80–99)
MCV RBC AUTO: 102.8 FL (ref 80–99)
MCV RBC AUTO: 103.5 FL (ref 80–99)
MCV RBC AUTO: 104 FL (ref 80–99)
MCV RBC AUTO: 104.3 FL (ref 80–99)
MCV RBC AUTO: 105 FL (ref 80–99)
MCV RBC AUTO: 99 FL (ref 80–99)
MONOCYTES # BLD: 0.6 K/UL (ref 0–1)
MONOCYTES # BLD: 0.6 K/UL (ref 0–1)
MONOCYTES # BLD: 0.8 K/UL (ref 0–1)
MONOCYTES NFR BLD: 8 % (ref 5–13)
MONOCYTES NFR BLD: 9 % (ref 5–13)
MONOCYTES NFR BLD: 9 % (ref 5–13)
NEUTS SEG # BLD: 6.2 K/UL (ref 1.8–8)
NEUTS SEG # BLD: 6.5 K/UL (ref 1.8–8)
NEUTS SEG # BLD: 6.9 K/UL (ref 1.8–8)
NEUTS SEG NFR BLD: 78 % (ref 32–75)
NEUTS SEG NFR BLD: 82 % (ref 32–75)
NEUTS SEG NFR BLD: 85 % (ref 32–75)
NITRITE UR QL STRIP.AUTO: NEGATIVE
NRBC # BLD: 0 K/UL (ref 0–0.01)
NRBC BLD-RTO: 0 PER 100 WBC
P-R INTERVAL, ECG05: 210 MS
P-R INTERVAL, ECG05: 256 MS
PH UR STRIP: 5 [PH] (ref 5–8)
PHOSPHATE SERPL-MCNC: 2.8 MG/DL (ref 2.6–4.7)
PHOSPHATE SERPL-MCNC: 3.1 MG/DL (ref 2.6–4.7)
PHOSPHATE SERPL-MCNC: 3.4 MG/DL (ref 2.6–4.7)
PHOSPHATE SERPL-MCNC: 3.7 MG/DL (ref 2.6–4.7)
PHOSPHATE SERPL-MCNC: 3.9 MG/DL (ref 2.6–4.7)
PHOSPHATE SERPL-MCNC: 4.1 MG/DL (ref 2.6–4.7)
PLATELET # BLD AUTO: 121 K/UL (ref 150–400)
PLATELET # BLD AUTO: 131 K/UL (ref 150–400)
PLATELET # BLD AUTO: 147 K/UL (ref 150–400)
PLATELET # BLD AUTO: 152 K/UL (ref 150–400)
PLATELET # BLD AUTO: 158 K/UL (ref 150–400)
PLATELET # BLD AUTO: 188 K/UL (ref 150–400)
PLATELET # BLD AUTO: 190 K/UL (ref 150–400)
PLATELET # BLD AUTO: 199 K/UL (ref 150–400)
PLATELET # BLD AUTO: 206 K/UL (ref 150–400)
PLATELET # BLD AUTO: 213 K/UL (ref 150–400)
PMV BLD AUTO: 10.2 FL (ref 8.9–12.9)
PMV BLD AUTO: 10.3 FL (ref 8.9–12.9)
PMV BLD AUTO: 10.4 FL (ref 8.9–12.9)
PMV BLD AUTO: 10.7 FL (ref 8.9–12.9)
PMV BLD AUTO: 11.1 FL (ref 8.9–12.9)
PMV BLD AUTO: 11.3 FL (ref 8.9–12.9)
PMV BLD AUTO: 11.6 FL (ref 8.9–12.9)
PMV BLD AUTO: 11.9 FL (ref 8.9–12.9)
PMV BLD AUTO: 12.2 FL (ref 8.9–12.9)
PMV BLD AUTO: 9.9 FL (ref 8.9–12.9)
POTASSIUM SERPL-SCNC: 4.2 MMOL/L (ref 3.5–5.1)
POTASSIUM SERPL-SCNC: 4.4 MMOL/L (ref 3.5–5.1)
POTASSIUM SERPL-SCNC: 4.5 MMOL/L (ref 3.5–5.1)
POTASSIUM SERPL-SCNC: 4.6 MMOL/L (ref 3.5–5.1)
POTASSIUM SERPL-SCNC: 5 MMOL/L (ref 3.5–5.1)
POTASSIUM SERPL-SCNC: 5.1 MMOL/L (ref 3.5–5.1)
PROCALCITONIN SERPL-MCNC: 0.08 NG/ML
PROCALCITONIN SERPL-MCNC: 0.72 NG/ML
PROT SERPL-MCNC: 6.1 G/DL (ref 6.4–8.2)
PROT SERPL-MCNC: 7 G/DL (ref 6.4–8.2)
PROT SERPL-MCNC: 7 G/DL (ref 6.4–8.2)
PROT SERPL-MCNC: 7.3 G/DL (ref 6.4–8.2)
PROT SERPL-MCNC: 7.4 G/DL (ref 6.4–8.2)
PROT SERPL-MCNC: 7.6 G/DL (ref 6.4–8.2)
PROT UR STRIP-MCNC: 30 MG/DL
PROTHROMBIN TIME: 10.8 SEC (ref 9–11.1)
PROTHROMBIN TIME: 11.3 SEC (ref 9–11.1)
PROTHROMBIN TIME: 11.5 SEC (ref 9–11.1)
PROTHROMBIN TIME: 11.8 SEC (ref 9–11.1)
PROTHROMBIN TIME: 11.9 SEC (ref 9–11.1)
PROTHROMBIN TIME: 12.4 SEC (ref 9–11.1)
PROTHROMBIN TIME: 13 SEC (ref 9–11.1)
PROTHROMBIN TIME: 13.1 SEC (ref 9–11.1)
PROTHROMBIN TIME: 13.5 SEC (ref 9–11.1)
PROTHROMBIN TIME: 14 SEC (ref 9–11.1)
PROTHROMBIN TIME: 16.6 SEC (ref 9–11.1)
Q-T INTERVAL, ECG07: 374 MS
Q-T INTERVAL, ECG07: 458 MS
QRS DURATION, ECG06: 104 MS
QRS DURATION, ECG06: 94 MS
QTC CALCULATION (BEZET), ECG08: 447 MS
QTC CALCULATION (BEZET), ECG08: 458 MS
RBC # BLD AUTO: 3.93 M/UL (ref 4.1–5.7)
RBC # BLD AUTO: 4.24 M/UL (ref 4.1–5.7)
RBC # BLD AUTO: 4.36 M/UL (ref 4.1–5.7)
RBC # BLD AUTO: 4.54 M/UL (ref 4.1–5.7)
RBC # BLD AUTO: 4.63 M/UL (ref 4.1–5.7)
RBC # BLD AUTO: 4.81 M/UL (ref 4.1–5.7)
RBC # BLD AUTO: 4.83 M/UL (ref 4.1–5.7)
RBC # BLD AUTO: 4.86 M/UL (ref 4.1–5.7)
RBC # BLD AUTO: 4.97 M/UL (ref 4.1–5.7)
RBC # BLD AUTO: 5.13 M/UL (ref 4.1–5.7)
RBC #/AREA URNS HPF: ABNORMAL /HPF (ref 0–5)
RBC MORPH BLD: ABNORMAL
SERVICE CMNT-IMP: ABNORMAL
SERVICE CMNT-IMP: NORMAL
SODIUM SERPL-SCNC: 132 MMOL/L (ref 136–145)
SODIUM SERPL-SCNC: 133 MMOL/L (ref 136–145)
SODIUM SERPL-SCNC: 138 MMOL/L (ref 136–145)
SODIUM SERPL-SCNC: 139 MMOL/L (ref 136–145)
SODIUM SERPL-SCNC: 140 MMOL/L (ref 136–145)
SODIUM SERPL-SCNC: 142 MMOL/L (ref 136–145)
SODIUM SERPL-SCNC: 142 MMOL/L (ref 136–145)
SODIUM SERPL-SCNC: 148 MMOL/L (ref 136–145)
SODIUM SERPL-SCNC: 149 MMOL/L (ref 136–145)
SODIUM SERPL-SCNC: 150 MMOL/L (ref 136–145)
SP GR UR REFRACTOMETRY: 1.02 (ref 1–1.03)
UA: UC IF INDICATED,UAUC: ABNORMAL
UROBILINOGEN UR QL STRIP.AUTO: 1 EU/DL (ref 0.2–1)
VENTRICULAR RATE, ECG03: 60 BPM
VENTRICULAR RATE, ECG03: 86 BPM
WBC # BLD AUTO: 10.2 K/UL (ref 4.1–11.1)
WBC # BLD AUTO: 10.9 K/UL (ref 4.1–11.1)
WBC # BLD AUTO: 12.5 K/UL (ref 4.1–11.1)
WBC # BLD AUTO: 16.4 K/UL (ref 4.1–11.1)
WBC # BLD AUTO: 7.3 K/UL (ref 4.1–11.1)
WBC # BLD AUTO: 7.5 K/UL (ref 4.1–11.1)
WBC # BLD AUTO: 8.1 K/UL (ref 4.1–11.1)
WBC # BLD AUTO: 8.3 K/UL (ref 4.1–11.1)
WBC # BLD AUTO: 8.8 K/UL (ref 4.1–11.1)
WBC # BLD AUTO: 9 K/UL (ref 4.1–11.1)
WBC URNS QL MICRO: ABNORMAL /HPF (ref 0–4)

## 2021-01-01 PROCEDURE — 99223 1ST HOSP IP/OBS HIGH 75: CPT | Performed by: NURSE PRACTITIONER

## 2021-01-01 PROCEDURE — 80048 BASIC METABOLIC PNL TOTAL CA: CPT

## 2021-01-01 PROCEDURE — 74011000258 HC RX REV CODE- 258: Performed by: INTERNAL MEDICINE

## 2021-01-01 PROCEDURE — 36415 COLL VENOUS BLD VENIPUNCTURE: CPT

## 2021-01-01 PROCEDURE — 86140 C-REACTIVE PROTEIN: CPT

## 2021-01-01 PROCEDURE — 65660000000 HC RM CCU STEPDOWN

## 2021-01-01 PROCEDURE — 74011250636 HC RX REV CODE- 250/636: Performed by: NURSE PRACTITIONER

## 2021-01-01 PROCEDURE — 74011250636 HC RX REV CODE- 250/636: Performed by: STUDENT IN AN ORGANIZED HEALTH CARE EDUCATION/TRAINING PROGRAM

## 2021-01-01 PROCEDURE — 87040 BLOOD CULTURE FOR BACTERIA: CPT

## 2021-01-01 PROCEDURE — 82962 GLUCOSE BLOOD TEST: CPT

## 2021-01-01 PROCEDURE — 74011000250 HC RX REV CODE- 250: Performed by: SPECIALIST

## 2021-01-01 PROCEDURE — 74011000250 HC RX REV CODE- 250

## 2021-01-01 PROCEDURE — 74011000250 HC RX REV CODE- 250: Performed by: NURSE PRACTITIONER

## 2021-01-01 PROCEDURE — 71045 X-RAY EXAM CHEST 1 VIEW: CPT

## 2021-01-01 PROCEDURE — 85027 COMPLETE CBC AUTOMATED: CPT

## 2021-01-01 PROCEDURE — 92526 ORAL FUNCTION THERAPY: CPT

## 2021-01-01 PROCEDURE — 74011250636 HC RX REV CODE- 250/636: Performed by: HOSPITALIST

## 2021-01-01 PROCEDURE — 74011000250 HC RX REV CODE- 250: Performed by: INTERNAL MEDICINE

## 2021-01-01 PROCEDURE — 74011000250 HC RX REV CODE- 250: Performed by: STUDENT IN AN ORGANIZED HEALTH CARE EDUCATION/TRAINING PROGRAM

## 2021-01-01 PROCEDURE — 80076 HEPATIC FUNCTION PANEL: CPT

## 2021-01-01 PROCEDURE — 84100 ASSAY OF PHOSPHORUS: CPT

## 2021-01-01 PROCEDURE — 74177 CT ABD & PELVIS W/CONTRAST: CPT

## 2021-01-01 PROCEDURE — 0DH67UZ INSERTION OF FEEDING DEVICE INTO STOMACH, VIA NATURAL OR ARTIFICIAL OPENING: ICD-10-PCS | Performed by: RADIOLOGY

## 2021-01-01 PROCEDURE — 84145 PROCALCITONIN (PCT): CPT

## 2021-01-01 PROCEDURE — 94760 N-INVAS EAR/PLS OXIMETRY 1: CPT

## 2021-01-01 PROCEDURE — 92610 EVALUATE SWALLOWING FUNCTION: CPT

## 2021-01-01 PROCEDURE — 85610 PROTHROMBIN TIME: CPT

## 2021-01-01 PROCEDURE — C9113 INJ PANTOPRAZOLE SODIUM, VIA: HCPCS | Performed by: NURSE PRACTITIONER

## 2021-01-01 PROCEDURE — 85379 FIBRIN DEGRADATION QUANT: CPT

## 2021-01-01 PROCEDURE — 83605 ASSAY OF LACTIC ACID: CPT

## 2021-01-01 PROCEDURE — 77010033678 HC OXYGEN DAILY

## 2021-01-01 PROCEDURE — 83735 ASSAY OF MAGNESIUM: CPT

## 2021-01-01 PROCEDURE — 2709999900 HC NON-CHARGEABLE SUPPLY

## 2021-01-01 PROCEDURE — 83615 LACTATE (LD) (LDH) ENZYME: CPT

## 2021-01-01 PROCEDURE — 74011250636 HC RX REV CODE- 250/636: Performed by: INTERNAL MEDICINE

## 2021-01-01 PROCEDURE — 74011000258 HC RX REV CODE- 258: Performed by: HOSPITALIST

## 2021-01-01 PROCEDURE — 85025 COMPLETE CBC W/AUTO DIFF WBC: CPT

## 2021-01-01 PROCEDURE — 74011250636 HC RX REV CODE- 250/636: Performed by: SPECIALIST

## 2021-01-01 PROCEDURE — 74011000258 HC RX REV CODE- 258: Performed by: STUDENT IN AN ORGANIZED HEALTH CARE EDUCATION/TRAINING PROGRAM

## 2021-01-01 PROCEDURE — 74011250637 HC RX REV CODE- 250/637: Performed by: INTERNAL MEDICINE

## 2021-01-01 PROCEDURE — 81001 URINALYSIS AUTO W/SCOPE: CPT

## 2021-01-01 PROCEDURE — 80053 COMPREHEN METABOLIC PANEL: CPT

## 2021-01-01 PROCEDURE — 44500 INTRO GASTROINTESTINAL TUBE: CPT

## 2021-01-01 PROCEDURE — P9045 ALBUMIN (HUMAN), 5%, 250 ML: HCPCS | Performed by: STUDENT IN AN ORGANIZED HEALTH CARE EDUCATION/TRAINING PROGRAM

## 2021-01-01 PROCEDURE — 74011000636 HC RX REV CODE- 636: Performed by: RADIOLOGY

## 2021-01-01 PROCEDURE — 74011250637 HC RX REV CODE- 250/637: Performed by: NURSE PRACTITIONER

## 2021-01-01 PROCEDURE — P9045 ALBUMIN (HUMAN), 5%, 250 ML: HCPCS | Performed by: HOSPITALIST

## 2021-01-01 PROCEDURE — 77030019905 HC CATH URETH INTMIT MDII -A

## 2021-01-01 PROCEDURE — 74011250637 HC RX REV CODE- 250/637: Performed by: HOSPITALIST

## 2021-01-01 PROCEDURE — XW033E5 INTRODUCTION OF REMDESIVIR ANTI-INFECTIVE INTO PERIPHERAL VEIN, PERCUTANEOUS APPROACH, NEW TECHNOLOGY GROUP 5: ICD-10-PCS | Performed by: HOSPITALIST

## 2021-01-01 PROCEDURE — 74011000636 HC RX REV CODE- 636: Performed by: STUDENT IN AN ORGANIZED HEALTH CARE EDUCATION/TRAINING PROGRAM

## 2021-01-01 RX ORDER — BUDESONIDE 0.5 MG/2ML
500 INHALANT ORAL 2 TIMES DAILY
COMMUNITY

## 2021-01-01 RX ORDER — PANTOPRAZOLE SODIUM 40 MG/1
40 TABLET, DELAYED RELEASE ORAL DAILY
COMMUNITY

## 2021-01-01 RX ORDER — GLYCOPYRROLATE 0.2 MG/ML
0.1 INJECTION INTRAMUSCULAR; INTRAVENOUS
Status: DISCONTINUED | OUTPATIENT
Start: 2021-01-01 | End: 2021-01-01 | Stop reason: HOSPADM

## 2021-01-01 RX ORDER — PRAVASTATIN SODIUM 40 MG/1
40 TABLET ORAL
Status: DISCONTINUED | OUTPATIENT
Start: 2021-01-01 | End: 2021-01-01

## 2021-01-01 RX ORDER — DEXTROSE MONOHYDRATE AND SODIUM CHLORIDE 5; .9 G/100ML; G/100ML
25 INJECTION, SOLUTION INTRAVENOUS CONTINUOUS
Status: DISCONTINUED | OUTPATIENT
Start: 2021-01-01 | End: 2021-01-01

## 2021-01-01 RX ORDER — LORAZEPAM 2 MG/ML
0.5 INJECTION INTRAMUSCULAR
Status: COMPLETED | OUTPATIENT
Start: 2021-01-01 | End: 2021-01-01

## 2021-01-01 RX ORDER — ERGOCALCIFEROL 1.25 MG/1
50000 CAPSULE ORAL
COMMUNITY

## 2021-01-01 RX ORDER — FAMOTIDINE 20 MG/1
20 TABLET, FILM COATED ORAL 2 TIMES DAILY
COMMUNITY

## 2021-01-01 RX ORDER — MIRTAZAPINE 15 MG/1
15 TABLET, FILM COATED ORAL
Status: DISCONTINUED | OUTPATIENT
Start: 2021-01-01 | End: 2021-01-01

## 2021-01-01 RX ORDER — ALBUMIN HUMAN 50 G/1000ML
25 SOLUTION INTRAVENOUS ONCE
Status: COMPLETED | OUTPATIENT
Start: 2021-01-01 | End: 2021-01-01

## 2021-01-01 RX ORDER — LORAZEPAM 2 MG/ML
1 CONCENTRATE ORAL
Status: DISCONTINUED | OUTPATIENT
Start: 2021-01-01 | End: 2021-01-01 | Stop reason: HOSPADM

## 2021-01-01 RX ORDER — ALBUMIN HUMAN 50 G/1000ML
25 SOLUTION INTRAVENOUS ONCE
Status: DISCONTINUED | OUTPATIENT
Start: 2021-01-01 | End: 2021-01-01

## 2021-01-01 RX ORDER — MIRTAZAPINE 15 MG/1
15 TABLET, FILM COATED ORAL
COMMUNITY

## 2021-01-01 RX ORDER — LIDOCAINE HYDROCHLORIDE 20 MG/ML
15 JELLY TOPICAL
Status: DISCONTINUED | OUTPATIENT
Start: 2021-01-01 | End: 2021-01-01 | Stop reason: HOSPADM

## 2021-01-01 RX ORDER — DEXTROSE MONOHYDRATE 50 MG/ML
100 INJECTION, SOLUTION INTRAVENOUS CONTINUOUS
Status: DISCONTINUED | OUTPATIENT
Start: 2021-01-01 | End: 2021-01-01

## 2021-01-01 RX ORDER — HYDROMORPHONE HYDROCHLORIDE 1 MG/ML
1 INJECTION, SOLUTION INTRAMUSCULAR; INTRAVENOUS; SUBCUTANEOUS ONCE
Status: DISCONTINUED | OUTPATIENT
Start: 2021-01-01 | End: 2021-01-01

## 2021-01-01 RX ORDER — HYDROMORPHONE HYDROCHLORIDE 1 MG/ML
1 INJECTION, SOLUTION INTRAMUSCULAR; INTRAVENOUS; SUBCUTANEOUS
Status: COMPLETED | OUTPATIENT
Start: 2021-01-01 | End: 2021-01-01

## 2021-01-01 RX ORDER — MORPHINE SULFATE 2 MG/ML
1-2 INJECTION, SOLUTION INTRAMUSCULAR; INTRAVENOUS
Status: DISCONTINUED | OUTPATIENT
Start: 2021-01-01 | End: 2021-01-01 | Stop reason: HOSPADM

## 2021-01-01 RX ORDER — VANCOMYCIN HYDROCHLORIDE
1250 EVERY 24 HOURS
Status: DISCONTINUED | OUTPATIENT
Start: 2021-01-01 | End: 2021-01-01

## 2021-01-01 RX ORDER — ENOXAPARIN SODIUM 100 MG/ML
30 INJECTION SUBCUTANEOUS EVERY 12 HOURS
Status: DISCONTINUED | OUTPATIENT
Start: 2021-01-01 | End: 2021-01-01

## 2021-01-01 RX ORDER — HEPARIN SODIUM 5000 [USP'U]/ML
5000 INJECTION, SOLUTION INTRAVENOUS; SUBCUTANEOUS EVERY 8 HOURS
Status: DISCONTINUED | OUTPATIENT
Start: 2021-01-01 | End: 2021-01-01

## 2021-01-01 RX ORDER — ENOXAPARIN SODIUM 100 MG/ML
40 INJECTION SUBCUTANEOUS EVERY 12 HOURS
Status: DISCONTINUED | OUTPATIENT
Start: 2021-01-01 | End: 2021-01-01

## 2021-01-01 RX ORDER — GUAIFENESIN 100 MG/5ML
81 LIQUID (ML) ORAL DAILY
Status: DISCONTINUED | OUTPATIENT
Start: 2021-01-01 | End: 2021-01-01

## 2021-01-01 RX ORDER — SODIUM CHLORIDE 9 MG/ML
100 INJECTION, SOLUTION INTRAVENOUS CONTINUOUS
Status: DISCONTINUED | OUTPATIENT
Start: 2021-01-01 | End: 2021-01-01

## 2021-01-01 RX ORDER — FACIAL-BODY WIPES
10 EACH TOPICAL DAILY PRN
Status: DISCONTINUED | OUTPATIENT
Start: 2021-01-01 | End: 2021-01-01 | Stop reason: HOSPADM

## 2021-01-01 RX ORDER — DEXTROSE MONOHYDRATE AND SODIUM CHLORIDE 5; .45 G/100ML; G/100ML
100 INJECTION, SOLUTION INTRAVENOUS CONTINUOUS
Status: DISCONTINUED | OUTPATIENT
Start: 2021-01-01 | End: 2021-01-01

## 2021-01-01 RX ORDER — LIDOCAINE HYDROCHLORIDE 20 MG/ML
JELLY TOPICAL
Status: COMPLETED
Start: 2021-01-01 | End: 2021-01-01

## 2021-01-01 RX ORDER — VANCOMYCIN/0.9 % SOD CHLORIDE 1.5G/250ML
1500 PLASTIC BAG, INJECTION (ML) INTRAVENOUS ONCE
Status: COMPLETED | OUTPATIENT
Start: 2021-01-01 | End: 2021-01-01

## 2021-01-01 RX ORDER — FUROSEMIDE 10 MG/ML
20 INJECTION INTRAMUSCULAR; INTRAVENOUS ONCE
Status: COMPLETED | OUTPATIENT
Start: 2021-01-01 | End: 2021-01-01

## 2021-01-01 RX ORDER — ARFORMOTEROL TARTRATE 15 UG/2ML
15 SOLUTION RESPIRATORY (INHALATION) 2 TIMES DAILY
COMMUNITY

## 2021-01-01 RX ORDER — ALBUMIN HUMAN 50 G/1000ML
12.5 SOLUTION INTRAVENOUS ONCE
Status: COMPLETED | OUTPATIENT
Start: 2021-01-01 | End: 2021-01-01

## 2021-01-01 RX ADMIN — OLANZAPINE 2.5 MG: 10 INJECTION, POWDER, FOR SOLUTION INTRAMUSCULAR at 05:57

## 2021-01-01 RX ADMIN — REMDESIVIR 100 MG: 100 INJECTION, POWDER, LYOPHILIZED, FOR SOLUTION INTRAVENOUS at 15:27

## 2021-01-01 RX ADMIN — IOPAMIDOL 100 ML: 755 INJECTION, SOLUTION INTRAVENOUS at 23:00

## 2021-01-01 RX ADMIN — ENOXAPARIN SODIUM 30 MG: 30 INJECTION, SOLUTION INTRAVENOUS; SUBCUTANEOUS at 05:29

## 2021-01-01 RX ADMIN — VANCOMYCIN HYDROCHLORIDE 750 MG: 750 INJECTION, POWDER, LYOPHILIZED, FOR SOLUTION INTRAVENOUS at 14:18

## 2021-01-01 RX ADMIN — PANTOPRAZOLE SODIUM 40 MG: 40 INJECTION, POWDER, FOR SOLUTION INTRAVENOUS at 08:43

## 2021-01-01 RX ADMIN — DEXTROSE MONOHYDRATE AND SODIUM CHLORIDE 75 ML/HR: 5; .9 INJECTION, SOLUTION INTRAVENOUS at 14:18

## 2021-01-01 RX ADMIN — PANTOPRAZOLE SODIUM 40 MG: 40 INJECTION, POWDER, FOR SOLUTION INTRAVENOUS at 10:26

## 2021-01-01 RX ADMIN — PANTOPRAZOLE SODIUM 40 MG: 40 INJECTION, POWDER, FOR SOLUTION INTRAVENOUS at 08:31

## 2021-01-01 RX ADMIN — CEFTRIAXONE SODIUM 2 G: 2 INJECTION, POWDER, FOR SOLUTION INTRAMUSCULAR; INTRAVENOUS at 10:26

## 2021-01-01 RX ADMIN — PANTOPRAZOLE SODIUM 40 MG: 40 INJECTION, POWDER, FOR SOLUTION INTRAVENOUS at 09:38

## 2021-01-01 RX ADMIN — DEXAMETHASONE SODIUM PHOSPHATE 6 MG: 4 INJECTION, SOLUTION INTRAMUSCULAR; INTRAVENOUS at 08:31

## 2021-01-01 RX ADMIN — ENOXAPARIN SODIUM 30 MG: 30 INJECTION, SOLUTION INTRAVENOUS; SUBCUTANEOUS at 15:56

## 2021-01-01 RX ADMIN — ENOXAPARIN SODIUM 30 MG: 30 INJECTION, SOLUTION INTRAVENOUS; SUBCUTANEOUS at 04:00

## 2021-01-01 RX ADMIN — OLANZAPINE 2.5 MG: 10 INJECTION, POWDER, FOR SOLUTION INTRAMUSCULAR at 13:27

## 2021-01-01 RX ADMIN — GLYCOPYRROLATE 0.1 MG: 0.2 INJECTION, SOLUTION INTRAMUSCULAR; INTRAVENOUS at 12:35

## 2021-01-01 RX ADMIN — Medication 10 ML: at 15:26

## 2021-01-01 RX ADMIN — Medication 10 ML: at 06:00

## 2021-01-01 RX ADMIN — ENOXAPARIN SODIUM 30 MG: 30 INJECTION, SOLUTION INTRAVENOUS; SUBCUTANEOUS at 03:30

## 2021-01-01 RX ADMIN — ENOXAPARIN SODIUM 30 MG: 30 INJECTION, SOLUTION INTRAVENOUS; SUBCUTANEOUS at 16:00

## 2021-01-01 RX ADMIN — PANTOPRAZOLE SODIUM 40 MG: 40 INJECTION, POWDER, FOR SOLUTION INTRAVENOUS at 08:00

## 2021-01-01 RX ADMIN — ENOXAPARIN SODIUM 30 MG: 30 INJECTION, SOLUTION INTRAVENOUS; SUBCUTANEOUS at 15:41

## 2021-01-01 RX ADMIN — REMDESIVIR 100 MG: 100 INJECTION, POWDER, LYOPHILIZED, FOR SOLUTION INTRAVENOUS at 15:56

## 2021-01-01 RX ADMIN — Medication 10 ML: at 23:00

## 2021-01-01 RX ADMIN — FAMOTIDINE 20 MG: 10 INJECTION INTRAVENOUS at 21:49

## 2021-01-01 RX ADMIN — ONDANSETRON 4 MG: 2 INJECTION INTRAMUSCULAR; INTRAVENOUS at 00:11

## 2021-01-01 RX ADMIN — Medication 10 ML: at 21:56

## 2021-01-01 RX ADMIN — Medication 10 ML: at 05:29

## 2021-01-01 RX ADMIN — PANTOPRAZOLE SODIUM 40 MG: 40 INJECTION, POWDER, FOR SOLUTION INTRAVENOUS at 08:26

## 2021-01-01 RX ADMIN — HEPARIN SODIUM 5000 UNITS: 5000 INJECTION INTRAVENOUS; SUBCUTANEOUS at 13:28

## 2021-01-01 RX ADMIN — Medication 10 ML: at 15:41

## 2021-01-01 RX ADMIN — Medication 10 ML: at 06:55

## 2021-01-01 RX ADMIN — Medication 10 ML: at 16:02

## 2021-01-01 RX ADMIN — DEXTROSE MONOHYDRATE 100 ML/HR: 5 INJECTION, SOLUTION INTRAVENOUS at 21:46

## 2021-01-01 RX ADMIN — AZITHROMYCIN 500 MG: 500 INJECTION, POWDER, LYOPHILIZED, FOR SOLUTION INTRAVENOUS at 06:27

## 2021-01-01 RX ADMIN — FAMOTIDINE 20 MG: 10 INJECTION INTRAVENOUS at 21:56

## 2021-01-01 RX ADMIN — ENOXAPARIN SODIUM 30 MG: 30 INJECTION, SOLUTION INTRAVENOUS; SUBCUTANEOUS at 04:36

## 2021-01-01 RX ADMIN — ENOXAPARIN SODIUM 30 MG: 30 INJECTION, SOLUTION INTRAVENOUS; SUBCUTANEOUS at 16:40

## 2021-01-01 RX ADMIN — PANTOPRAZOLE SODIUM 40 MG: 40 INJECTION, POWDER, FOR SOLUTION INTRAVENOUS at 09:18

## 2021-01-01 RX ADMIN — OLANZAPINE 5 MG: 10 INJECTION, POWDER, FOR SOLUTION INTRAMUSCULAR at 01:16

## 2021-01-01 RX ADMIN — FAMOTIDINE 20 MG: 10 INJECTION INTRAVENOUS at 21:42

## 2021-01-01 RX ADMIN — Medication 10 ML: at 21:57

## 2021-01-01 RX ADMIN — Medication 10 ML: at 21:53

## 2021-01-01 RX ADMIN — AZITHROMYCIN 500 MG: 500 INJECTION, POWDER, LYOPHILIZED, FOR SOLUTION INTRAVENOUS at 06:55

## 2021-01-01 RX ADMIN — ENOXAPARIN SODIUM 30 MG: 30 INJECTION, SOLUTION INTRAVENOUS; SUBCUTANEOUS at 03:41

## 2021-01-01 RX ADMIN — Medication 10 ML: at 06:57

## 2021-01-01 RX ADMIN — PANTOPRAZOLE SODIUM 40 MG: 40 INJECTION, POWDER, FOR SOLUTION INTRAVENOUS at 09:40

## 2021-01-01 RX ADMIN — ALBUMIN (HUMAN) 12.5 G: 12.5 INJECTION, SOLUTION INTRAVENOUS at 09:05

## 2021-01-01 RX ADMIN — DEXAMETHASONE SODIUM PHOSPHATE 6 MG: 4 INJECTION, SOLUTION INTRAMUSCULAR; INTRAVENOUS at 08:00

## 2021-01-01 RX ADMIN — Medication 10 ML: at 14:34

## 2021-01-01 RX ADMIN — CEFEPIME HYDROCHLORIDE 2 G: 2 INJECTION, POWDER, FOR SOLUTION INTRAVENOUS at 00:09

## 2021-01-01 RX ADMIN — ENOXAPARIN SODIUM 30 MG: 30 INJECTION, SOLUTION INTRAVENOUS; SUBCUTANEOUS at 03:49

## 2021-01-01 RX ADMIN — PANTOPRAZOLE SODIUM 40 MG: 40 INJECTION, POWDER, FOR SOLUTION INTRAVENOUS at 09:07

## 2021-01-01 RX ADMIN — Medication 10 ML: at 14:18

## 2021-01-01 RX ADMIN — LORAZEPAM 0.5 MG: 2 INJECTION INTRAMUSCULAR; INTRAVENOUS at 16:57

## 2021-01-01 RX ADMIN — Medication 10 ML: at 21:46

## 2021-01-01 RX ADMIN — ENOXAPARIN SODIUM 30 MG: 30 INJECTION, SOLUTION INTRAVENOUS; SUBCUTANEOUS at 16:24

## 2021-01-01 RX ADMIN — ENOXAPARIN SODIUM 30 MG: 30 INJECTION, SOLUTION INTRAVENOUS; SUBCUTANEOUS at 18:06

## 2021-01-01 RX ADMIN — HYDROMORPHONE HYDROCHLORIDE 1 MG: 1 INJECTION, SOLUTION INTRAMUSCULAR; INTRAVENOUS; SUBCUTANEOUS at 03:49

## 2021-01-01 RX ADMIN — PANTOPRAZOLE SODIUM 40 MG: 40 INJECTION, POWDER, FOR SOLUTION INTRAVENOUS at 09:00

## 2021-01-01 RX ADMIN — DEXTROSE MONOHYDRATE 100 ML/HR: 5 INJECTION, SOLUTION INTRAVENOUS at 18:05

## 2021-01-01 RX ADMIN — DEXTROSE MONOHYDRATE 100 ML/HR: 5 INJECTION, SOLUTION INTRAVENOUS at 22:21

## 2021-01-01 RX ADMIN — GLYCOPYRROLATE 0.1 MG: 0.2 INJECTION, SOLUTION INTRAMUSCULAR; INTRAVENOUS at 11:53

## 2021-01-01 RX ADMIN — DEXTROSE MONOHYDRATE 100 ML/HR: 5 INJECTION, SOLUTION INTRAVENOUS at 16:02

## 2021-01-01 RX ADMIN — Medication 10 ML: at 13:53

## 2021-01-01 RX ADMIN — Medication 10 ML: at 21:25

## 2021-01-01 RX ADMIN — MIRTAZAPINE 15 MG: 15 TABLET, FILM COATED ORAL at 21:42

## 2021-01-01 RX ADMIN — CEFTRIAXONE SODIUM 2 G: 2 INJECTION, POWDER, FOR SOLUTION INTRAMUSCULAR; INTRAVENOUS at 09:40

## 2021-01-01 RX ADMIN — REMDESIVIR 100 MG: 100 INJECTION, POWDER, LYOPHILIZED, FOR SOLUTION INTRAVENOUS at 18:07

## 2021-01-01 RX ADMIN — Medication 10 ML: at 22:51

## 2021-01-01 RX ADMIN — Medication 10 ML: at 22:20

## 2021-01-01 RX ADMIN — Medication 10 ML: at 13:24

## 2021-01-01 RX ADMIN — ALBUMIN (HUMAN) 25 G: 12.5 INJECTION, SOLUTION INTRAVENOUS at 14:50

## 2021-01-01 RX ADMIN — DEXTROSE MONOHYDRATE 100 ML/HR: 5 INJECTION, SOLUTION INTRAVENOUS at 00:30

## 2021-01-01 RX ADMIN — OLANZAPINE 2.5 MG: 10 INJECTION, POWDER, FOR SOLUTION INTRAMUSCULAR at 21:25

## 2021-01-01 RX ADMIN — DEXTROSE AND SODIUM CHLORIDE 100 ML/HR: 5; 450 INJECTION, SOLUTION INTRAVENOUS at 14:59

## 2021-01-01 RX ADMIN — Medication 10 ML: at 22:09

## 2021-01-01 RX ADMIN — REMDESIVIR 200 MG: 100 INJECTION, POWDER, LYOPHILIZED, FOR SOLUTION INTRAVENOUS at 15:12

## 2021-01-01 RX ADMIN — MORPHINE SULFATE 1 MG: 2 INJECTION, SOLUTION INTRAMUSCULAR; INTRAVENOUS at 18:53

## 2021-01-01 RX ADMIN — Medication 10 ML: at 13:29

## 2021-01-01 RX ADMIN — PANTOPRAZOLE SODIUM 40 MG: 40 INJECTION, POWDER, FOR SOLUTION INTRAVENOUS at 08:02

## 2021-01-01 RX ADMIN — Medication 10 ML: at 06:23

## 2021-01-01 RX ADMIN — DOCUSATE SODIUM - SENNOSIDES 1 TABLET: 50; 8.6 TABLET, FILM COATED ORAL at 21:42

## 2021-01-01 RX ADMIN — ENOXAPARIN SODIUM 30 MG: 30 INJECTION, SOLUTION INTRAVENOUS; SUBCUTANEOUS at 16:18

## 2021-01-01 RX ADMIN — Medication 10 ML: at 14:00

## 2021-01-01 RX ADMIN — Medication 10 ML: at 06:27

## 2021-01-01 RX ADMIN — ENOXAPARIN SODIUM 30 MG: 30 INJECTION, SOLUTION INTRAVENOUS; SUBCUTANEOUS at 16:06

## 2021-01-01 RX ADMIN — HEPARIN SODIUM 5000 UNITS: 5000 INJECTION INTRAVENOUS; SUBCUTANEOUS at 06:57

## 2021-01-01 RX ADMIN — DEXTROSE MONOHYDRATE 100 ML/HR: 5 INJECTION, SOLUTION INTRAVENOUS at 09:57

## 2021-01-01 RX ADMIN — DEXAMETHASONE SODIUM PHOSPHATE 6 MG: 4 INJECTION, SOLUTION INTRAMUSCULAR; INTRAVENOUS at 09:41

## 2021-01-01 RX ADMIN — DEXAMETHASONE SODIUM PHOSPHATE 6 MG: 4 INJECTION, SOLUTION INTRAMUSCULAR; INTRAVENOUS at 10:26

## 2021-01-01 RX ADMIN — DEXTROSE MONOHYDRATE 100 ML/HR: 5 INJECTION, SOLUTION INTRAVENOUS at 02:14

## 2021-01-01 RX ADMIN — ENOXAPARIN SODIUM 30 MG: 30 INJECTION, SOLUTION INTRAVENOUS; SUBCUTANEOUS at 16:02

## 2021-01-01 RX ADMIN — ENOXAPARIN SODIUM 30 MG: 30 INJECTION, SOLUTION INTRAVENOUS; SUBCUTANEOUS at 15:57

## 2021-01-01 RX ADMIN — DEXTROSE MONOHYDRATE 100 ML/HR: 5 INJECTION, SOLUTION INTRAVENOUS at 16:50

## 2021-01-01 RX ADMIN — DEXTROSE MONOHYDRATE 100 ML/HR: 5 INJECTION, SOLUTION INTRAVENOUS at 08:32

## 2021-01-01 RX ADMIN — CEFTRIAXONE SODIUM 2 G: 2 INJECTION, POWDER, FOR SOLUTION INTRAMUSCULAR; INTRAVENOUS at 09:39

## 2021-01-01 RX ADMIN — DEXAMETHASONE SODIUM PHOSPHATE 6 MG: 4 INJECTION, SOLUTION INTRAMUSCULAR; INTRAVENOUS at 09:07

## 2021-01-01 RX ADMIN — DEXTROSE MONOHYDRATE 100 ML/HR: 5 INJECTION, SOLUTION INTRAVENOUS at 08:00

## 2021-01-01 RX ADMIN — OLANZAPINE 2.5 MG: 10 INJECTION, POWDER, FOR SOLUTION INTRAMUSCULAR at 21:51

## 2021-01-01 RX ADMIN — LIDOCAINE HYDROCHLORIDE 1 ML: 20 JELLY TOPICAL at 14:00

## 2021-01-01 RX ADMIN — CEFEPIME HYDROCHLORIDE 2 G: 2 INJECTION, POWDER, FOR SOLUTION INTRAVENOUS at 12:48

## 2021-01-01 RX ADMIN — FUROSEMIDE 20 MG: 10 INJECTION, SOLUTION INTRAMUSCULAR; INTRAVENOUS at 09:52

## 2021-01-01 RX ADMIN — SODIUM CHLORIDE: 234 INJECTION INTRAMUSCULAR; INTRAVENOUS; SUBCUTANEOUS at 04:34

## 2021-01-01 RX ADMIN — GLYCOPYRROLATE 0.1 MG: 0.2 INJECTION, SOLUTION INTRAMUSCULAR; INTRAVENOUS at 17:56

## 2021-01-01 RX ADMIN — ENOXAPARIN SODIUM 30 MG: 30 INJECTION, SOLUTION INTRAVENOUS; SUBCUTANEOUS at 04:35

## 2021-01-01 RX ADMIN — Medication 10 ML: at 21:43

## 2021-01-01 RX ADMIN — DEXAMETHASONE SODIUM PHOSPHATE 6 MG: 4 INJECTION, SOLUTION INTRAMUSCULAR; INTRAVENOUS at 09:54

## 2021-01-01 RX ADMIN — ENOXAPARIN SODIUM 30 MG: 30 INJECTION, SOLUTION INTRAVENOUS; SUBCUTANEOUS at 03:17

## 2021-01-01 RX ADMIN — VANCOMYCIN HYDROCHLORIDE 1500 MG: 10 INJECTION, POWDER, LYOPHILIZED, FOR SOLUTION INTRAVENOUS at 13:42

## 2021-01-01 RX ADMIN — OLANZAPINE 5 MG: 10 INJECTION, POWDER, FOR SOLUTION INTRAMUSCULAR at 12:49

## 2021-01-01 RX ADMIN — DEXAMETHASONE SODIUM PHOSPHATE 6 MG: 4 INJECTION, SOLUTION INTRAMUSCULAR; INTRAVENOUS at 08:02

## 2021-01-01 RX ADMIN — Medication 10 ML: at 04:37

## 2021-01-01 RX ADMIN — PRAVASTATIN SODIUM 40 MG: 40 TABLET ORAL at 21:42

## 2021-01-01 RX ADMIN — FAMOTIDINE 20 MG: 10 INJECTION INTRAVENOUS at 21:59

## 2021-01-01 RX ADMIN — REMDESIVIR 100 MG: 100 INJECTION, POWDER, LYOPHILIZED, FOR SOLUTION INTRAVENOUS at 15:41

## 2021-01-01 RX ADMIN — Medication 10 ML: at 06:08

## 2021-01-01 RX ADMIN — GLYCOPYRROLATE 0.1 MG: 0.2 INJECTION, SOLUTION INTRAMUSCULAR; INTRAVENOUS at 04:36

## 2021-01-01 RX ADMIN — DEXTROSE MONOHYDRATE AND SODIUM CHLORIDE 75 ML/HR: 5; .9 INJECTION, SOLUTION INTRAVENOUS at 05:29

## 2021-01-01 RX ADMIN — IOHEXOL 20 ML: 240 INJECTION, SOLUTION INTRATHECAL; INTRAVASCULAR; INTRAVENOUS; ORAL at 16:00

## 2021-01-01 RX ADMIN — FAMOTIDINE: 10 INJECTION INTRAVENOUS at 20:35

## 2021-01-01 RX ADMIN — Medication 10 ML: at 21:49

## 2021-01-01 RX ADMIN — DEXTROSE MONOHYDRATE 100 ML/HR: 5 INJECTION, SOLUTION INTRAVENOUS at 12:08

## 2021-01-01 RX ADMIN — HEPARIN SODIUM 5000 UNITS: 5000 INJECTION INTRAVENOUS; SUBCUTANEOUS at 22:51

## 2021-01-01 RX ADMIN — Medication 10 ML: at 20:36

## 2021-01-01 RX ADMIN — AZITHROMYCIN 500 MG: 500 INJECTION, POWDER, LYOPHILIZED, FOR SOLUTION INTRAVENOUS at 05:54

## 2021-01-01 RX ADMIN — HEPARIN SODIUM 5000 UNITS: 5000 INJECTION INTRAVENOUS; SUBCUTANEOUS at 01:22

## 2021-01-01 RX ADMIN — Medication 10 ML: at 06:28

## 2021-01-01 RX ADMIN — SODIUM CHLORIDE: 234 INJECTION INTRAMUSCULAR; INTRAVENOUS; SUBCUTANEOUS at 13:21

## 2021-01-01 RX ADMIN — CEFEPIME HYDROCHLORIDE 2 G: 2 INJECTION, POWDER, FOR SOLUTION INTRAVENOUS at 12:37

## 2021-01-01 RX ADMIN — CEFTRIAXONE SODIUM 2 G: 2 INJECTION, POWDER, FOR SOLUTION INTRAMUSCULAR; INTRAVENOUS at 08:31

## 2021-01-01 RX ADMIN — ENOXAPARIN SODIUM 30 MG: 30 INJECTION, SOLUTION INTRAVENOUS; SUBCUTANEOUS at 03:46

## 2021-01-01 RX ADMIN — DEXAMETHASONE SODIUM PHOSPHATE 6 MG: 4 INJECTION, SOLUTION INTRAMUSCULAR; INTRAVENOUS at 09:39

## 2021-01-01 NOTE — ED NOTES
Bedside and Verbal shift change report given to Chris Simms RN (oncoming nurse) by Cherri Mack RN (offgoing nurse). Report included the following information SBAR, ED Summary, MAR and Recent Results.

## 2021-01-01 NOTE — PROGRESS NOTES
. 
 
 
Hospitalist Progress Note NAME: Ayah Guerra :  1940 MRN:  [id number] Assessment / Plan: 
Spoke with daughter Sees Pulmonary associates for IPF/COPD Continues with Brovana and Budesonide 0.5 mg 
detailed regarding benefits and risk of antiviral treatment, including kidney and liver injury and were agreeable to treatment Acute on chronic hypoxic respiratory failure usually on 3 liters HOT 
COVID pneumonia Pulmonary fibrosis 
- cont dexamethasone, vitamin C and Zinc 
- to be stared on remdesivir 
- cont azithromycin and ceftriaxone ARAVIND/CKD stage III Creatinine in .3+ and earlier this month .92 
- appears euvolumic now 
- avoid nephrotoxic drugs 
- will continue to monitor Dementia 
 
- will maintain on Zyprexa PRN today  
- one time dose of lorazepam ordered Restless leg syndrome SSS s/p PPM 
Schatzki ring 18.5 - 24.9 Normal weight / Body mass index is 21.47 kg/m². Code status: DNR confirmed with daughter Prophylaxis: Hep SQ Recommended Disposition: SNF/LTC Subjective: Chief Complaint / Reason for Physician Visit \"I haven't seen in in quite some time\". Continuing follow of Mr. Frieda Villagran with  Discussed with RN events overnight. Review of Systems: 
Symptom Y/N Comments  Symptom Y/N Comments Fever/Chills    Chest Pain Poor Appetite    Edema Cough    Abdominal Pain Sputum    Joint Pain SOB/CORRIGAN    Pruritis/Rash Nausea/vomit    Tolerating PT/OT Diarrhea    Tolerating Diet Constipation    Other Could NOT obtain due to: confused Objective: VITALS:  
Last 24hrs VS reviewed since prior progress note. Most recent are: 
Patient Vitals for the past 24 hrs: 
 Temp Pulse Resp BP SpO2  
21 0900  60 21 (!) 101/56 96 % 21 0830  63 27 (!) 85/49 96 % 21 0800  60 20 (!) 86/50 97 % 21 0749 97.4 °F (36.3 °C) 60 25 (!) 86/54 96 % 21 0700  61 22 (!) 92/54 99 % 01/01/21 0633  61 23 99/61 98 % 01/01/21 0500  (!) 59 19 (!) 97/59 97 % 01/01/21 0400 98 °F (36.7 °C) 60 24 (!) 101/59 98 % 01/01/21 0300  63 26 (!) 95/58 97 % 01/01/21 0221  60 25 (!) 113/58 99 % 01/01/21 0200  68 20  96 % 01/01/21 0100  60 17  100 % 01/01/21 0000 98 °F (36.7 °C) 62 22 109/66 90 % 12/31/20 2300  65 15 (!) 112/58 95 % 12/31/20 2254     98 % 12/31/20 2228  65 22  98 % 12/31/20 2200  66 24 (!) 114/56 98 % 12/31/20 2145     90 % 12/31/20 2143 98.4 °F (36.9 °C)      
12/31/20 2100  62 27 122/65 100 % 12/31/20 2008     100 % 12/31/20 2000 97.8 °F (36.6 °C) 64 28 136/69 100 % 12/31/20 1959 97.8 °F (36.6 °C)      
12/31/20 1812 98.2 °F (36.8 °C) 69 19 104/66 (!) 77 % 12/31/20 1700  61 22 119/63 100 % 12/31/20 1500 98.7 °F (37.1 °C) 61 21 104/68 100 % 12/31/20 1452  60 (!) 31 109/64 99 % 12/31/20 1441  61 21 (!) 75/49 100 % 12/31/20 1431  60 21 (!) 81/55 100 % 12/31/20 1415  (!) 59 20 (!) 73/44 100 % 12/31/20 1400  62 26 (!) 68/51 98 % 12/31/20 1300  63 23 117/72 100 % 12/31/20 1210  72 23  95 % 12/31/20 1200  73 23 97/63 (!) 85 % 12/31/20 1113     100 % 12/31/20 1100  68 26 117/78 99 % 12/31/20 1047 98.4 °F (36.9 °C) 73 29  100 % 12/31/20 1037  83 25 129/69 (!) 85 % Intake/Output Summary (Last 24 hours) at 1/1/2021 3430 Last data filed at 1/1/2021 2807 Gross per 24 hour Intake  Output 775 ml Net -775 ml I had a face to face encounter and independently examined this patient on 1/1/2021, as outlined below: PHYSICAL EXAM: 
General: WD, WN. Alert, cooperative, no acute distress EENT:  EOMI. Anicteric sclerae. MMM Resp:  Bilateral subscapular fine crackles, no wheezing or rales. No accessory muscle use CV:  Regular  rhythm,  No edema GI:  Soft, Non distended, Non tender. +Bowel sounds Neurologic:  Alert and disoriented (demented), normal speech,  
 Psych:   Poor insight. Not anxious nor agitated Skin:  No rashes. No jaundice Reviewed most current lab test results and cultures  YES Reviewed most current radiology test results   YES Review and summation of old records today    Yes Reviewed patient's current orders and MAR    YES 
PMH/SH reviewed - no change compared to H&P 
________________________________________________________________________ Care Plan discussed with: 
  Comments Patient x Family  x Daughter over the phone RN x Care Manager Consultant Multidiciplinary team rounds were held today with , nursing, pharmacist and clinical coordinator. Patient's plan of care was discussed; medications were reviewed and discharge planning was addressed. ________________________________________________________________________ Total NON critical care TIME:  41   Minutes Total CRITICAL CARE TIME Spent:   Minutes non procedure based Comments >50% of visit spent in counseling and coordination of care x   
________________________________________________________________________ Apryl Fox MD  
 
Procedures: see electronic medical records for all procedures/Xrays and details which were not copied into this note but were reviewed prior to creation of Plan. LABS: 
I reviewed today's most current labs and imaging studies. Pertinent labs include: 
Recent Labs 01/01/21 0540 12/31/20 
0334 12/30/20 
2153 WBC 8.8 8.4 7.2 HGB 15.3 14.3 14.8 HCT 45.7 43.3 45.1  182 210 Recent Labs 01/01/21 
0540 12/31/20 
9822 12/30/20 
2303  137 136  
K 4.2 4.0 4.4  105 107 CO2 27 24 24 GLU 72 99 82 BUN 60* 48* 49* CREA 1.88* 1.73* 1.75* CA 8.7 8.1* 8.0*  
MG 2.2 2.0 2.0 PHOS 3.9 3.2  --   
ALB 2.9* 3.0* 2.8* TBILI 0.5 1.0 0.5 ALT 17 18 18 INR 1.1 1.1 1.1 Signed: Apryl Fox MD

## 2021-01-01 NOTE — PROGRESS NOTES
0710: Verbal shift change report given to Yasmani Harris RN (oncoming nurse) by Juanita Calvo RN (offgoing nurse). Report included the following information SBAR, Kardex, ED Summary, Intake/Output, MAR, Recent Results and Cardiac Rhythm NSR, paced. RN overnight held precedex due to bradycardia. 0745: Assessment completed, see flowsheets. Pt confused, alert only to self, does not know what month it is or that in hospital. Pt does know daughter name, Fara. Pt deaf w/ HA at baseline. RN provided reorientation. Pt pulling at gown, EKG stickers, condom cath and IV lines, appears restless. Pt turned and repositioned. Mouth care provided, pt to remain NPO until speech eval.  
 
8892: Pt remains restless, orders for bilateral mitts obtained. Provider also alerted of BP soft, consistently below MAP 65, SBP below 90. Orders for albumin 5%. 0900: Provider at bedside to assess pt for plan of care. 0930: Provider called pt daughter to update pt status. Pt code status changed to DNR, orders for pharmacy to dose consult for remdesivir, and per MD goal of BP is MAP >60. Transfer orders for PCU placed. 1107: Provider paged regarding d/c BiPAP orders for transferring pt. 1130: Reassessment completed; no changes from previous assessment. 1245: Pt continues to be restless, orders for precedex have been d/c'd. Provider paged for additional orders, received for zyprexa Q8 prn agitation. Pt needing reorientation about every 15 minutes to keep calm and stay in bed. Pt remains confused at baseline. 1327: Zyprexa given prn restlessness and agitation. 1600: RN in room, reached out to provider via perfect serve text. RN in room about every 5 minutes now. Pt attempting to bite mittens to take them off, attempting to get up OOB. RN has provided continual reorientation for pt, remains confused and restless. Awaiting orders from provider.   
 
1653: TRANSFER - OUT REPORT: 
 
 Verbal report given to Shira Parra RN PCU(name) on Renetta Chavis  being transferred to PCU 31 [phone] [id number] (unit) for routine progression of care Report consisted of patients Situation, Background, Assessment and  
Recommendations(SBAR). Information from the following report(s) SBAR, Kardex, Intake/Output, MAR, Recent Results and Cardiac Rhythm NSR, AV paced was reviewed with the receiving nurse. Lines:  
Peripheral IV 12/31/20 Left Forearm (Active) Site Assessment Clean, dry, & intact 01/01/21 0745 Phlebitis Assessment 0 01/01/21 1130 Infiltration Assessment 0 01/01/21 1130 Dressing Status Clean, dry, & intact 01/01/21 1130 Dressing Type Tape;Transparent 01/01/21 1130 Hub Color/Line Status Pink;Capped; Patent 01/01/21 1130 Action Taken Open ports on tubing capped 01/01/21 1130 Alcohol Cap Used Yes 01/01/21 1130 Peripheral IV 12/31/20 Distal;Right;Posterior Forearm (Active) Site Assessment Clean, dry, & intact 01/01/21 1130 Phlebitis Assessment 0 01/01/21 1130 Infiltration Assessment 0 01/01/21 1130 Dressing Status Clean, dry, & intact 01/01/21 1130 Dressing Type Tape;Transparent 01/01/21 1130 Hub Color/Line Status Pink; Infusing;Patent 01/01/21 1130 Action Taken Open ports on tubing capped 01/01/21 1130 Alcohol Cap Used Yes 01/01/21 1130 Opportunity for questions and clarification was provided. Patient transported with: 
 Monitor O2 @ 4 liters Registered Nurse Tech

## 2021-01-01 NOTE — PROGRESS NOTES
2021: Report received from ED Liang, RN 
 
2145: Pt transported from ED to CCU 2520 via stretcher with monitor and on 6L o2. VSS and in no acute distress. Pt oriented to himself, knows year but is unsure of where he is. Updated pt on plan of care and events of stay thus far. Pt pleasantly confused. Assisted pt with sip of water and immediately started coughing. Concern for aspiration risk and held any oral meds and sips for Speech/Swallow eval.  
Updated NP Pam Penaloza on pt condition- orders to downgrade patient and transfer out once bed available for Tele. Keep strict NPO for Sp/Swallow eval.  
 
0100: Pt having increased confusion and trying to pull at gown and lines. Re-orient patient. Pt states he is hungry and wants water. Explain to pt risk of aspiration with coughing fits after sips of water. Mouth care with swab provided. 0215: Pt had another episode of confusion and trying to pull at items. Re-orient patient. Mouth care with swab provided. 0630: Mouth care with swab provided.   
 
0700: Report to Carmina Rod

## 2021-01-01 NOTE — PROGRESS NOTES
Progress Note Patient: Petros Barker MRN: [id number]  SSN: [id number] YOB: 1940  Age: [de-identified] y.o. Sex: male Admit Date: 12/30/2020 LOS: 0 days Transfer acceptance note. Patient stable to be transferred from critical care unit to telemetry. Earlier today patient was transferred from ER to ICU for acute hypoxic respiratory failure needing high flow nasal cannula/BiPAP. Currently on nasal cannula 6 liters sats 96% with HR SR, BP stable and in NAD. ICU team downgraded to telemetry tele bed on medical floor. Hospital course/HPI/ICU course This is an [de-identified] yr old male with a PMHx of bilateral deafness, Pulmonary fibrosis, COPD on 3 liters oxygen, SSS s/p Pacer, CKD, HLD/HTN, hernia, GERD and arthritis who was brought in by EMS 12/30/20 for respiratory distress and sats 70s in the field on his baseline 3 liter NC. He was given an albuterol treatment with no relief and in ER was found to have moderate pulmonary edema on PCXR superimposed on pulmonary fibrosis changes. Manolo Charles His ABG was reflective of hypoxia with a PO2 139 on 100% but was otherwise normal. He was placed on BIPAP 100% and given SL NTG for relief of dyspnea and given 40 Lasix IV x1 with poor result in UO. Lab findings include BNP 1159, LA 2.1, Procal was 2.1 and COVID swab was POS. On exam he was dyspneic but mentating well and was very difficult to understand with BIPAP in place. He was reported to have increased confusion at home over the last month per his daughter Fara who reports he lives alone and the family checks on him routinely, in addition to home health nursing and they are in the process of getting him to an assisted living or SNF facility for additional assistance. I discussed the findings with her and relayed the COVID + result and asked her to discuss with her siblings goals of care, informing her that with his prior co morbidities of pulm fibrosis, COPD and CKD that he would likely not do well on a ventilator. Her number is [phone] (FARA [email or web address]) and she reports her brother, a physician is POA. He will be admitted to ICU for further care. 1. Acute hypoxic respiratory failure with COVID 19 viral pneumonia w/ Hx of ILD, COPD on home O2 3 liters 
-Supplemental oxygen for sats > 90% with HFNC/BIPAP settings per orders 
-SARs-COV-2 + 
-Discussed remdesivir with daughter Fritz Waldrop, informed her that patient may not be a candidate 2/2 chronic renal impairment  
-PF ratio 139 (on BIPAP 100%) Trend inflammatory markers to include LDH, Ferritin, CRP, D dimer every other day -Heparin 5,000 units sub. Q12hr 2/2 impaired renal function 
-Continue Duonebs Q4h 
-Dexamethasone 6 mg IV x 10 days 
 -Willing to pursue Convalescent plasma, need consent once arrived to ICU. -Start antibiotic therapy for CAP coverage, azithro and ceftriaxone x 5 days total 
-Check Procalcitonin 0.16 
-Precedex infusion for BIPAP tolerance, comfort 
-Discussed intubation status with daughter, encouraged her to discuss with siblings. I informed her in the setting of existing co-morbidities to include ILD and COPD with CRF, that he likely would not do well with intubation 2. Pulmonary edema 
-Received 40mg IV Lasix x1 in ER 
-Bumex 1 mg IV now -BNP 1159, troponin NEG, trend x 2 (continue if up trending) Subjective:  
 Denies Chest pain . Positive Mild SOB Objective:  
Positive mild resp. distress Vitals:  
 12/31/20 2145 12/31/20 2200 12/31/20 2228 12/31/20 2254 BP:  (!) 114/56 Pulse:  66 65 Resp:  24 22 Temp:      
SpO2: 90% 98% 98% 98% Weight:      
Height:      
  
 
Intake and Output: 
Current Shift: No intake/output data recorded. Last three shifts: No intake/output data recorded. Physical Exam:  
Physical Exam: 
  
General:  Alert, cooperative,  well noursished, well developed, appears stated age Eyes:  Sclera anicteric. Pupils equally round and reactive to light. Mouth/Throat: Mucous membranes normal, oral pharynx clear Neck: Supple Lungs:   Dim to auscultation bilaterally, good effort, Mild tachypneic CV:  Regular rate and rhythm,no murmur, click, rub or gallop Abdomen:   Soft, non-tender. bowel sounds normal. non-distended Extremities: No cyanosis or edema Skin: Skin color, texture, turgor normal. no acute rash or lesions Lymph nodes: Cervical and supraclavicular normal  
Musculoskeletal: No swelling or deformity Lines/Devices:  Intact, no erythema, drainage or tenderness Psych: Alert and oriented Recent Results (from the past 24 hour(s)) SARS-COV-2 Collection Time: 12/31/20 12:05 AM  
Result Value Ref Range Specimen source Nasopharyngeal    
 Specimen source Nasopharyngeal    
 COVID-19 rapid test Detected (AA) NOTD Specimen type NP Swab Health status Symptomatic Testing INFLUENZA A+B VIRAL AGS Collection Time: 12/31/20  3:16 AM  
Result Value Ref Range Influenza A Antigen Negative NEG Influenza B Antigen Negative NEG METABOLIC PANEL, BASIC Collection Time: 12/31/20  3:34 AM  
Result Value Ref Range Sodium 137 136 - 145 mmol/L Potassium 4.0 3.5 - 5.1 mmol/L Chloride 105 97 - 108 mmol/L  
 CO2 24 21 - 32 mmol/L Anion gap 8 5 - 15 mmol/L Glucose 99 65 - 100 mg/dL BUN 48 (H) 6 - 20 MG/DL Creatinine 1.73 (H) 0.70 - 1.30 MG/DL  
 BUN/Creatinine ratio 28 (H) 12 - 20 GFR est AA 46 (L) >60 ml/min/1.73m2 GFR est non-AA 38 (L) >60 ml/min/1.73m2 Calcium 8.1 (L) 8.5 - 10.1 MG/DL  
CBC W/O DIFF Collection Time: 12/31/20  3:34 AM  
Result Value Ref Range WBC 8.4 4.1 - 11.1 K/uL  
 RBC 4.28 4.10 - 5.70 M/uL  
 HGB 14.3 12.1 - 17.0 g/dL HCT 43.3 36.6 - 50.3 % .2 (H) 80.0 - 99.0 FL  
 MCH 33.4 26.0 - 34.0 PG  
 MCHC 33.0 30.0 - 36.5 g/dL  
 RDW 14.9 (H) 11.5 - 14.5 % PLATELET 077 597 - 567 K/uL MPV 10.4 8.9 - 12.9 FL  
 NRBC 0.0 0  WBC ABSOLUTE NRBC 0.00 0.00 - 0.01 K/uL MAGNESIUM Collection Time: 12/31/20  3:34 AM  
Result Value Ref Range Magnesium 2.0 1.6 - 2.4 mg/dL PHOSPHORUS Collection Time: 12/31/20  3:34 AM  
Result Value Ref Range Phosphorus 3.2 2.6 - 4.7 MG/DL FIBRINOGEN Collection Time: 12/31/20  3:34 AM  
Result Value Ref Range Fibrinogen 483 (H) 200 - 475 mg/dL CRP, HIGH SENSITIVITY Collection Time: 12/31/20  3:34 AM  
Result Value Ref Range CRP, High sensitivity >9.5 mg/L  
LD Collection Time: 12/31/20  3:34 AM  
Result Value Ref Range   (H) 85 - 241 U/L  
HEPATIC FUNCTION PANEL  
 Collection Time: 12/31/20  3:34 AM  
Result Value Ref Range Protein, total 6.9 6.4 - 8.2 g/dL Albumin 3.0 (L) 3.5 - 5.0 g/dL Globulin 3.9 2.0 - 4.0 g/dL A-G Ratio 0.8 (L) 1.1 - 2.2 Bilirubin, total 1.0 0.2 - 1.0 MG/DL Bilirubin, direct 0.3 (H) 0.0 - 0.2 MG/DL Alk. phosphatase 103 45 - 117 U/L  
 AST (SGOT) 26 15 - 37 U/L  
 ALT (SGPT) 18 12 - 78 U/L  
D DIMER Collection Time: 12/31/20  3:34 AM  
Result Value Ref Range D-dimer 3.37 (H) 0.00 - 0.65 mg/L FEU PROTHROMBIN TIME + INR Collection Time: 12/31/20  3:34 AM  
Result Value Ref Range INR 1.1 0.9 - 1.1 Prothrombin time 11.1 9.0 - 11.1 sec LACTIC ACID Collection Time: 12/31/20 11:12 AM  
Result Value Ref Range Lactic acid 2.9 (HH) 0.4 - 2.0 MMOL/L  
EKG, 12 LEAD, INITIAL Collection Time: 12/31/20  3:17 PM  
Result Value Ref Range Ventricular Rate 60 BPM  
 Atrial Rate 60 BPM  
 P-R Interval 256 ms QRS Duration 104 ms Q-T Interval 458 ms QTC Calculation (Bezet) 458 ms Calculated P Axis 27 degrees Calculated R Axis -50 degrees Calculated T Axis -36 degrees Diagnosis Atrial-paced rhythm with prolonged AV conduction Left anterior fascicular block T wave abnormality, consider anterior ischemia When compared with ECG of 30-DEC-2020 22:16, 
MANUAL COMPARISON REQUIRED, DATA IS UNCONFIRMED Lab/Data Review:  
Recent Results (from the past 12 hour(s)) EKG, 12 LEAD, INITIAL Collection Time: 12/31/20  3:17 PM  
Result Value Ref Range Ventricular Rate 60 BPM  
 Atrial Rate 60 BPM  
 P-R Interval 256 ms QRS Duration 104 ms Q-T Interval 458 ms QTC Calculation (Bezet) 458 ms Calculated P Axis 27 degrees Calculated R Axis -50 degrees Calculated T Axis -36 degrees Diagnosis Atrial-paced rhythm with prolonged AV conduction Left anterior fascicular block T wave abnormality, consider anterior ischemia When compared with ECG of 30-DEC-2020 22:16, 
 MANUAL COMPARISON REQUIRED, DATA IS UNCONFIRMED Assessment:  
 
Plan:  
 
Acute hypoxic respiratory failure secondary to Covid pneumonia Currently on Decadron and oxygen supplement currently 6 L saturating 96%, not a candidate for remdesivir secondary to renal failure, on antibiotic azithromycin and ceftriaxone for possible community-acquired pneumonia. History of COPD currently on steroid and oxygen supplementation and albuterol inhaler as needed. No nebulization secondary to positive Covid status Of Note pt is on 3 L NC baseline at home for pulmonary stenosis and COPD Chronic renal failure Previous Baseline creat is 1.92 per lab review, currently 1.73. Hx Hiatal hernia, GERD 
-pantoprazole 40mg iv daily 
-Daughter verbalized concerns he may be aspirating at home, will need ST eval prior to diet initiation 
-Currently patient is n.p.o. and will hold all p.o. medication and swallow evaluation consulted Hyperlipidemia/hypertension/GERD. Holding home aspirin statin p.o. medication as patient n.p.o. awaiting swallow evaluation Signed By: Pily Garrett MD   
 December 31, 2020

## 2021-01-01 NOTE — PROGRESS NOTES
Pt was noted to follow with us for IPF, COPD. Has been seen by Dr. Ciarra Ramos in past. 
He has been offered specific therapy for IPF per Dr. Ciarra Ramos in past but chose not to start any therapy. Pt is currently being managed by the ICU team. 
Please call the consult for us when pt has moved out of ICU. Thank you.

## 2021-01-01 NOTE — PROGRESS NOTES
Patient transferred from ER to ICU on nasal cannula 6 liters sats 96% with HR SR, BP stable and in NAD.  Downgrade order placed in computer for tele status and signout given to hospitalist.

## 2021-01-01 NOTE — PROGRESS NOTES
TRANSFER - IN REPORT: 
 
Verbal report received from Cat(name) on Felix Pollock  being received from CCU(unit) for routine progression of care Report consisted of patients Situation, Background, Assessment and  
Recommendations(SBAR). Information from the following report(s) SBAR, Kardex, Intake/Output, Recent Results and Quality Measures was reviewed with the receiving nurse. Opportunity for questions and clarification was provided. Assessment completed upon patients arrival to unit and care assumed. Messaged doctor about mirtazipine at bedtime

## 2021-01-01 NOTE — ED NOTES
TRANSFER - OUT REPORT: 
 
Verbal report given to CCU RN(name) on Richard Lucero  being transferred to CCU(unit) for routine progression of care Report consisted of patients Situation, Background, Assessment and  
Recommendations(SBAR). Information from the following report(s) SBAR, ED Summary, [facility] and Recent Results was reviewed with the receiving nurse. Lines:  
Peripheral IV 12/31/20 Left Forearm (Active) Site Assessment Clean, dry, & intact 12/31/20 1056 Phlebitis Assessment 0 12/31/20 1056 Infiltration Assessment 0 12/31/20 1056 Dressing Status Clean, dry, & intact 12/31/20 1056 Dressing Type Transparent 12/31/20 1056 Hub Color/Line Status Pink;Flushed;Patent 12/31/20 1056 Opportunity for questions and clarification was provided. Patient transported with: 
 Monitor O2 @ 6 liters Registered Nurse

## 2021-01-01 NOTE — PROGRESS NOTES
Pharmacy Medication Reconciliation Allergy Update: Patient has no known allergies. Recommendations/Findings: The following amendments were made to the patient's active medication list on file at [facility]:  
1) Additions:  
     Mk Souza - Budesonide - Vitamin D 
     - Famotidine - Mirtazapine - Protonix 2) Deletions: - Azelastine - Vitamin D3 
     - Advair - Hydrocortisone - Ketoconazole - Omeprazole - Pravastatin 3) Changes: None Pertinent Findings:  
    - Patient very hard of hearing, COVID positive, and lives alone so med rec completed using fill history. 
 
-Clarified PTA med list with Rx Query. PTA medication list was corrected to the following:  
 
Prior to Admission Medications Prescriptions Last Dose Informant Taking? ALBUTEROL SULFATE (PROAIR HFA IN)   No  
Sig: Take 2 puffs by inhalation four (4) times daily as needed. albuterol (PROVENTIL VENTOLIN) 2.5 mg /3 mL (0.083 %) nebulizer solution   No  
Sig: 3 mL by Nebulization route every four (4) hours as needed for Wheezing or Shortness of Breath. arformoteroL (Brovana) 15 mcg/2 mL nebu neb solution   Yes Sig: 15 mcg by Nebulization route two (2) times a day. aspirin delayed-release 81 mg tablet Unknown at Unknown time  No  
Sig: Take  by mouth daily. budesonide (PULMICORT) 0.5 mg/2 mL nbsp   Yes Si mcg by Nebulization route two (2) times a day. coenzyme q10 30 mg capsule Unknown at Unknown time  No  
Sig: Take 30 mg by mouth three (3) times daily. ergocalciferol (Vitamin D2) 1,250 mcg (50,000 unit) capsule   Yes Sig: Take 50,000 Units by mouth every seven (7) days. famotidine (PEPCID) 20 mg tablet   Yes Sig: Take 20 mg by mouth two (2) times a day. loratadine (CLARITIN) 10 mg tablet Unknown at Unknown time  No  
Sig: Take 10 mg by mouth daily. mirtazapine (REMERON) 15 mg tablet   Yes Sig: Take 15 mg by mouth nightly. pantoprazole (Protonix) 40 mg tablet   Yes Sig: Take 40 mg by mouth daily. Facility-Administered Medications: None Thank you, Oj Alvarez, PHARMD

## 2021-01-02 NOTE — PROGRESS NOTES
. 
 
 
Hospitalist Progress Note NAME: Melisa Messer :  1940 MRN:  [id number] Assessment / Plan: 
Spoke with daughter Sees Pulmonary associates for IPF/COPD Continues with Brovana and Budesonide 0.5 mg 
detailed regarding benefits and risk of antiviral treatment, including kidney and liver injury and were agreeable to treatment Acute on chronic hypoxic respiratory failure usually on 3 liters HOT 
COVID pneumonia Pulmonary fibrosis CRP appears to have peaked and started to improve now bellow 5.5 
- cont dexamethasone, vitamin C and Zinc 
- stared on remdesivir 2021 
- cont azithromycin and ceftriaxone ARAVIND/CKD stage III  improved Creatinine in .3+ and earlier this month 1.92 Appears now back to base line - appears euvolumic now 
- avoid nephrotoxic drugs 
- will continue to monitor Dementia 
 
- will maintain on Zyprexa PRN  
- one time dose of lorazepam ordered 2021 Restless leg syndrome SSS s/p PPM 
Schatzki ring 18.5 - 24.9 Normal weight / Body mass index is 21.47 kg/m². Code status: DNR confirmed with daughter Prophylaxis: Hep SQ Recommended Disposition: SNF/LTC Subjective: Chief Complaint / Reason for Physician Visit Mr. Ayaka Magaña had received olanzapine and he was too sedated to communicate with me toheraclio. Continuing follow of Mr. Ayaka Magaña with covid pneumonia, and Lung fibrosis. Discussed with RN events overnight. Review of Systems: 
Symptom Y/N Comments  Symptom Y/N Comments Fever/Chills    Chest Pain Poor Appetite    Edema Cough    Abdominal Pain Sputum    Joint Pain SOB/CORRIGAN    Pruritis/Rash Nausea/vomit    Tolerating PT/OT Diarrhea    Tolerating Diet Constipation    Other Could NOT obtain due to: confused Objective: VITALS:  
Last 24hrs VS reviewed since prior progress note. Most recent are: 
Patient Vitals for the past 24 hrs: 
 Temp Pulse Resp BP SpO2 01/02/21 1225  81 27  (!) 82 % 01/02/21 1100  79 29 (!) 123/94 94 % 01/02/21 0930  77 20  93 % 01/02/21 0732 99.2 °F (37.3 °C) 70 (!) 31 110/70 94 % 01/02/21 0634  64 27  92 % 01/02/21 0535  65 27  97 % 01/02/21 0424  60 26  94 % 01/02/21 0322 99.5 °F (37.5 °C) 63 22 129/61 91 % 01/02/21 0230  60 22  97 % 01/02/21 0120  66 27  (!) 89 % 01/02/21 0025 98 °F (36.7 °C) 62 22 129/78 94 % 01/01/21 2330  60 23  96 % 01/01/21 2243  63 20 133/78 95 % 01/01/21 2141  61 26 122/65 99 % 01/01/21 2021  60 22  93 % 01/01/21 2001 98 °F (36.7 °C) 60 21 (!) 138/93 94 % 01/01/21 1720 98.3 °F (36.8 °C)  22 124/80 96 % 01/01/21 1630  63 22 113/77 95 % 01/01/21 1611 98.2 °F (36.8 °C) 66 25 (!) 148/71 91 % 01/01/21 1530  72 25 131/82 91 % Intake/Output Summary (Last 24 hours) at 1/2/2021 1501 Last data filed at 1/2/2021 3630 Gross per 24 hour Intake 250 ml Output 600 ml Net -350 ml I had a face to face encounter and independently examined this patient on 1/2/2021, as outlined below: PHYSICAL EXAM: 
General: chonicaly ill appearing, cachectic, drowsy EENT:  EOMI. Anicteric sclerae. MMM Resp:  Bilateral subscapular fine crackles, no wheezing or rales. No accessory muscle use CV:  Regular  rhythm,  No edema GI:  Soft, Non distended, Non tender. +Bowel sounds Neurologic:  Alert and disoriented (demented), normal speech, Psych:   Poor insight. Not anxious nor agitated Skin:  No rashes. No jaundice Reviewed most current lab test results and cultures  YES Reviewed most current radiology test results   YES Review and summation of old records today    NO Reviewed patient's current orders and MAR    YES 
PMH/SH reviewed - no change compared to H&P 
________________________________________________________________________ Care Plan discussed with: 
  Comments Patient x Family RN x Care Manager Consultant Multidiciplinary team rounds were held today with , nursing, pharmacist and clinical coordinator. Patient's plan of care was discussed; medications were reviewed and discharge planning was addressed. ________________________________________________________________________ Total NON critical care TIME: 31  Minutes Total CRITICAL CARE TIME Spent:   Minutes non procedure based Comments >50% of visit spent in counseling and coordination of care x   
________________________________________________________________________ Abran Seip, MD  
 
Procedures: see electronic medical records for all procedures/Xrays and details which were not copied into this note but were reviewed prior to creation of Plan. LABS: 
I reviewed today's most current labs and imaging studies. Pertinent labs include: 
Recent Labs 01/02/21 
8039 01/01/21 
0540 12/31/20 
1932 WBC 8.1 8.8 8.4 HGB 15.5 15.3 14.3 HCT 47.6 45.7 43.3  213 182 Recent Labs 01/02/21 
9451 01/01/21 
0540 12/31/20 
9266  139 137  
K 4.5 4.2 4.0  
* 107 105 CO2 26 27 24 GLU 79 72 99 BUN 56* 60* 48* CREA 1.48* 1.88* 1.73* CA 9.0 8.7 8.1*  
MG 2.3 2.2 2.0 PHOS 3.4 3.9 3.2 ALB 3.1* 2.9* 3.0* TBILI 0.8 0.5 1.0 ALT 19 17 18 INR 1.0 1.1 1.1 Signed: Abran Seip, MD

## 2021-01-02 NOTE — ED PROVIDER NOTES
EMERGENCY DEPARTMENT HISTORY AND PHYSICAL EXAM 
 
 
Date: 12/30/2020 
Patient Name: Mk Dalal Jr 
 
Please note that this dictation was completed with [employer], the computer voice recognition software.  Quite often unanticipated grammatical, syntax, homophones, and other interpretive errors are inadvertently transcribed by the computer software.  Please disregard these errors.  Please excuse any errors that have escaped final proofreading. 
 
History of Presenting Illness  
 
Chief Complaint  
Patient presents with  
• Shortness of Breath  
 
 
History Provided By: Patient, EMS, Daughter 
 
HPI: Mk Dalal Jr, 80 y.o. male, with a past medical history significant for idiopathic pulmonary fibrosis, COPD presenting the emergency department with shortness of breath.  Daughter reports that he has had increasing shortness of breath over the last 2 days.  She noticed that yesterday he seemed to be more tired appearing, and may have vomited yesterday.  No vomiting today.  Shortness of breath has increased and he seems more somnolent to her.  Reports that he is more confused.  He was tested for Covid, but they do not have the results back yet.  She states that she has been trying to keep him safe and he does not leave the house and the only person that comes over is an nurse have a few family members and no one has been sick.  Patient is on 3 L of oxygen at rest, but arrived to the emergency department requiring 10 L via nonrebreather.  History of present illness and review of systems is difficult to obtain from the patient given his respiratory status. 
 
PCP: Maricel Key, JONATHAN 
 
No current facility-administered medications on file prior to encounter.   
 
Current Outpatient Medications on File Prior to Encounter  
Medication Sig Dispense Refill  
• arformoteroL (Brovana) 15 mcg/2 mL nebu neb solution 15 mcg by Nebulization route two (2) times a day.    
  budesonide (PULMICORT) 0.5 mg/2 mL nbsp 500 mcg by Nebulization route two (2) times a day.  ergocalciferol (Vitamin D2) 1,250 mcg (50,000 unit) capsule Take 50,000 Units by mouth every seven (7) days.  famotidine (PEPCID) 20 mg tablet Take 20 mg by mouth two (2) times a day.  mirtazapine (REMERON) 15 mg tablet Take 15 mg by mouth nightly.  pantoprazole (Protonix) 40 mg tablet Take 40 mg by mouth daily.  aspirin delayed-release 81 mg tablet Take  by mouth daily.  albuterol (PROVENTIL VENTOLIN) 2.5 mg /3 mL (0.083 %) nebulizer solution 3 mL by Nebulization route every four (4) hours as needed for Wheezing or Shortness of Breath. 1 Package 1  
 ALBUTEROL SULFATE (PROAIR HFA IN) Take 2 puffs by inhalation four (4) times daily as needed.  loratadine (CLARITIN) 10 mg tablet Take 10 mg by mouth daily.  coenzyme q10 30 mg capsule Take 30 mg by mouth three (3) times daily. Past History Past Medical History: 
Past Medical History:  
Diagnosis Date  Arthritis   
 parris knee  Asthma   
 dr Cata Iniguez 5/18/17  Basal cell carcinoma of skin 05/10/2017  
 right helix Jose David Baez's note rec'd  CAD (coronary artery disease)   
 dr Lucy Baldwin  CKD (chronic kidney disease), stage III 05/2014 Winnie Tolliver notes 8/8/17 note and lab  Colon polyps 09/25/13  
 also dad with colon cancer  Contact dermatitis and other eczema, due to unspecified cause   
 eczema dr Humaira Lombardi  COPD   
 dr Leonardo Kim chronic bronchitis     5/18/17  Elevated serum creatinine  Fall 11 & 12/2012 Right knee gave away and tripped in his house  GERD (gastroesophageal reflux disease)   
 dr Ibrahim Mayela (hard of hearing)  Hypercholesterolemia  ILD (interstitial lung disease) ([facility] [address] 07/15/2014 Dr Júnior Manley's   pulmonary fibrosis 5/18/17 f/u note  Proteinuria 10/2012  
 abou assi     12/2/16  RLS (restless legs syndrome)  Schatzki's ring 5/03 dilation dr Jose Waldron  Screening for glaucoma 2016 [facility] note rec'd (annual eye exam)  Shingles 2012  Sleep apnea   
 uses CPAP- 17 f/u note [facility] note/sleep study 17 Past Surgical History: 
Past Surgical History:  
Procedure Laterality Date  ENDOSCOPY, COLON, DIAGNOSTIC    
  dr Robby Edmond repeat in 5 yrs  HX COLONOSCOPY  [id number]  
 brady- ileocecal valve polyp 5 yr repeat [facility]ud[facility] Lean HEENT  Summer 2017 Right ear basal cell carcinoma  HX HERNIA REPAIR  2018 Open left inguinal hernia repair with mesh.  HX PACEMAKER  2017 Dr. Tharon Scheuermann    
 left inq hernia  LA CARDIAC SURG PROCEDURE UNLIST  2017 Pacemaker  LA EXTRAC ERUPTED TOOTH/EXPOSED ROOT  2017  
 3 teeth extraction. Family History: 
Family History Problem Relation Age of Onset  Cancer Father   
      from colon cancer Social History: 
Social History Tobacco Use  Smoking status: Former Smoker  Smokeless tobacco: Former User Quit date: 1985 Substance Use Topics  Alcohol use: No  
 Drug use: No  
 
 
Allergies: 
No Known Allergies Review of Systems Review of Systems Unable to perform ROS: Acuity of condition (patient is extremely short of breath, on high flow oxygen and is hard of hearing.) Physical Exam  
Physical Exam 
Vitals signs and nursing note reviewed. Constitutional:   
   General: He is in acute distress. Comments:   Frail elderly appearing male, severe respiratory distress HENT:  
   Head: Normocephalic and atraumatic. Eyes:  
   General:     
   Right eye: No discharge. Left eye: No discharge. Conjunctiva/sclera: Conjunctivae normal.  
   Pupils: Pupils are equal, round, and reactive to light. Neck: Musculoskeletal: Normal range of motion and neck supple. Trachea: No tracheal deviation. Cardiovascular:  
   Rate and Rhythm: Normal rate and regular rhythm. Heart sounds: Normal heart sounds. No murmur. Pulmonary:  
   Effort: Tachypnea, accessory muscle usage and respiratory distress present. Breath sounds: Examination of the right-lower field reveals rhonchi. Examination of the left-lower field reveals rhonchi. Rhonchi present. No wheezing or rales. Abdominal:  
   General: Bowel sounds are normal.  
   Palpations: Abdomen is soft. Tenderness: There is no abdominal tenderness. There is no guarding or rebound. Musculoskeletal: Normal range of motion. General: No tenderness or deformity. Skin: 
   General: Skin is warm and dry. Findings: No erythema or rash. Neurological:  
   Mental Status: He is alert. Comments: Patient is oriented to self, but does not appear to be completely oriented to situation. Psychiatric:     
   Behavior: Behavior normal.  
 
 
 
Diagnostic Study Results Labs - Recent Results (from the past 12 hour(s)) CBC W/O DIFF Collection Time: 01/02/21  3:15 AM  
Result Value Ref Range WBC 8.1 4.1 - 11.1 K/uL  
 RBC 4.63 4.10 - 5.70 M/uL  
 HGB 15.5 12.1 - 17.0 g/dL HCT 47.6 36.6 - 50.3 % .8 (H) 80.0 - 99.0 FL  
 MCH 33.5 26.0 - 34.0 PG  
 MCHC 32.6 30.0 - 36.5 g/dL  
 RDW 14.8 (H) 11.5 - 14.5 % PLATELET 716 532 - 731 K/uL MPV 9.9 8.9 - 12.9 FL  
 NRBC 0.0 0  WBC ABSOLUTE NRBC 0.00 0.00 - 0.01 K/uL METABOLIC PANEL, BASIC Collection Time: 01/02/21  3:15 AM  
Result Value Ref Range Sodium 142 136 - 145 mmol/L Potassium 4.5 3.5 - 5.1 mmol/L Chloride 112 (H) 97 - 108 mmol/L  
 CO2 26 21 - 32 mmol/L Anion gap 4 (L) 5 - 15 mmol/L Glucose 79 65 - 100 mg/dL BUN 56 (H) 6 - 20 MG/DL Creatinine 1.48 (H) 0.70 - 1.30 MG/DL  
 BUN/Creatinine ratio 38 (H) 12 - 20 GFR est AA 55 (L) >60 ml/min/1.73m2 GFR est non-AA 46 (L) >60 ml/min/1.73m2  Calcium 9.0 8.5 - 10.1 MG/DL  
 D DIMER Collection Time: 01/02/21  3:15 AM  
Result Value Ref Range D-dimer 1.26 (H) 0.00 - 0.65 mg/L FEU  
HEPATIC FUNCTION PANEL Collection Time: 01/02/21  3:15 AM  
Result Value Ref Range Protein, total 7.4 6.4 - 8.2 g/dL Albumin 3.1 (L) 3.5 - 5.0 g/dL Globulin 4.3 (H) 2.0 - 4.0 g/dL A-G Ratio 0.7 (L) 1.1 - 2.2 Bilirubin, total 0.8 0.2 - 1.0 MG/DL Bilirubin, direct 0.2 0.0 - 0.2 MG/DL Alk. phosphatase 99 45 - 117 U/L  
 AST (SGOT) 32 15 - 37 U/L  
 ALT (SGPT) 19 12 - 78 U/L  
LD Collection Time: 01/02/21  3:15 AM  
Result Value Ref Range  (H) 85 - 241 U/L MAGNESIUM Collection Time: 01/02/21  3:15 AM  
Result Value Ref Range Magnesium 2.3 1.6 - 2.4 mg/dL PHOSPHORUS Collection Time: 01/02/21  3:15 AM  
Result Value Ref Range Phosphorus 3.4 2.6 - 4.7 MG/DL  
DIFFERENTIAL, AUTO Collection Time: 01/02/21  3:15 AM  
Result Value Ref Range NEUTROPHILS 85 (H) 32 - 75 % LYMPHOCYTES 7 (L) 12 - 49 % MONOCYTES 8 5 - 13 % EOSINOPHILS 0 0 - 7 % BASOPHILS 0 0 - 1 % IMMATURE GRANULOCYTES 0 0.0 - 0.5 % ABS. NEUTROPHILS 6.9 1.8 - 8.0 K/UL  
 ABS. LYMPHOCYTES 0.6 (L) 0.8 - 3.5 K/UL  
 ABS. MONOCYTES 0.6 0.0 - 1.0 K/UL  
 ABS. EOSINOPHILS 0.0 0.0 - 0.4 K/UL  
 ABS. BASOPHILS 0.0 0.0 - 0.1 K/UL  
 ABS. IMM. GRANS. 0.0 0.00 - 0.04 K/UL  
 DF SMEAR SCANNED    
 RBC COMMENTS MACROCYTOSIS 
1+ PROCALCITONIN Collection Time: 01/02/21  3:15 AM  
Result Value Ref Range Procalcitonin 0.08 ng/mL PROTHROMBIN TIME + INR Collection Time: 01/02/21  3:15 AM  
Result Value Ref Range INR 1.0 0.9 - 1.1 Prothrombin time 10.8 9.0 - 11.1 sec Radiologic Studies -  
XR CHEST PORT Final Result IMPRESSION: Recurrent or persistent interstitial and alveolar edema likely  
reflecting congestive failure. Underlying infectious infiltrate could be  
obscured. CT Results  (Last 48 hours) None CXR Results  (Last 48 hours) None Medical Decision Making I am the first provider for this patient. I reviewed the vital signs, available nursing notes, past medical history, past surgical history, family history and social history. Vital Signs-Reviewed the patient's vital signs. Patient Vitals for the past 12 hrs: 
 Temp Pulse Resp BP SpO2  
01/02/21 0732  70 (!) 31  94 % 01/02/21 0634  64 27  92 % 01/02/21 0535  65 27  97 % 01/02/21 0424  60 26  94 % 01/02/21 0322 99.5 °F (37.5 °C) 63 22 129/61 91 % 01/02/21 0230  60 22  97 % 01/02/21 0120  66 27  (!) 89 % 01/02/21 0025 98 °F (36.7 °C) 62 22 129/78 94 % 01/01/21 2330  60 23  96 % 01/01/21 2243  63 20 133/78 95 % 01/01/21 2141  61 26 122/65 99 % 01/01/21 2021  60 22  93 % 01/01/21 2001 98 °F (36.7 °C) 60 21 (!) 138/93 94 % EKG interpretation: (Preliminary) EKG shows sinus rhythm, rate 86. Left axis deviation. Left anterior fascicular block. There is a first-degree AV block. No evidence of ST elevation myocardial infarction. Records Reviewed:  
Nursing notes, Prior visits Patient seen in the ED on 12/15 for some hip pain/leg pain, DVT rule out. No evidence of fracture Provider Notes (Medical Decision Making):  
 Patient presenting in severe respiratory distress, chest x-ray looks like pulmonary edema. Family feels confident that he is unlikely to have Covid given he has not been around anybody other than a few family members and no one else has been sick. He does not look volume overloaded, this could be flash pulmonary edema from an acute infection, he could have aspirated the other day when he vomited. At this point though he is not vomiting and he is in severe respiratory distress, I think the best course of treatment to start off with would be noninvasive positive pressure ventilation. Will cover with antibiotics. Can get a dose of Lasix given concern for pulmonary edema. Anticipate hospitalization, anticipate hospitalization in the ICU. Will test for COVID-19. ED Course:  
Initial assessment performed. The patients presenting problems have been discussed, and they are in agreement with the care plan formulated and outlined with them. I have encouraged them to ask questions as they arise throughout their visit. Laboratory testing has been delayed secondary to utilization of the labs. I discussed the case with the intensivist nurse practitioner, but given limited work-up available for final disposition may be delayed. But given the use of positive pressure ventilation I think that he would likely benefit from the ICU. At this time I do not think that we could obtain a CT scan given his respiratory status without intubation I do not feel that there is sufficient risk for PE that we will require intubation to be able to get a CT. After disposition to the intensivist COVID-19 testing came back positive. Steroids with more already given in the form of Solu-Medrol, but she will start dexamethasone. Given patient's chronic kidney disease he is not likely a candidate for restimavir r, but possibly convalescent plasma Critical Care Time:  
 I have spent 55 minutes of critical care time in evaluating and treating this patient. This includes time spent at bedside, time with family and decision makers, documentation, review of labs and imaging, and/or consultation with specialists. It does not include time spent on separately billed procedures. This patient presents with a critical illness or injury that acutely impairs one or more vital organ systems such that there is a high probability of imminent or life threatening deterioration in the patient's condition. This case involved decision making of high complexity to assess, manipulate, and support vital organ system failure and/or to prevent further life threatening deterioration of the patient's condition. Failure to initiate these interventions on an urgent basis would likely result in sudden, clinically significant or life threatening deterioration in the patient's condition. Abnormal findings supporting critical care: Hypoxic respiratory failure requiring noninvasive positive pressure ventilation, COVID-19 Interventions to support critical care: Noninvasive positive pressure ventilation, IV antibiotics, diuretic empirically when chest x-ray showed evidence of pulmonary edema, specialist consultation, ICU admission Failure to intervene may result in: Death, hypoxic injury, organ dysfunction Disposition: 
Patient is being admitted to the hospital by the intensivist nurse practitioner respiratory failure. The results of their tests and reasons for their admission have been discussed with them and/or available family. They convey agreement and understanding for the need to be admitted and for their admission diagnosis. Consultation has been made with the inpatient physician specialist for hospitalization. PLAN: 
1. Current Discharge Medication List  
  
 
2. Follow-up Information None Return to ED if worse Diagnosis Clinical Impression: 1. Acute on chronic respiratory failure with hypoxia (HCC) 2. COVID-19   
3. Chronic kidney disease, unspecified CKD stage Attestations:  
This note was completed by Ricki Howe DO

## 2021-01-02 NOTE — PROGRESS NOTES
Bedside and Verbal shift change report given to [address] (oncoming nurse) by Sheridan Welsh (offgoing nurse). Report included the following information SBAR, Kardex, Intake/Output, MAR, Recent Results and Quality Measures. 6508 restraints renewed End of Shift Note Bedside shift change report given to eladiorita (oncoming nurse) by Amalia Marti RN (offgoing nurse). Report included the following information SBAR, Kardex, Intake/Output, Recent Results and Quality Measures Shift worked:  day Shift summary and any significant changes:  
  none Concerns for physician to address:  none at this time Zone phone for oncoming shift:    
  
 
Activity: 
Activity Level: Bed Rest 
Number times ambulated in hallways past shift: 0 Number of times OOB to chair past shift: 0 Cardiac:  
Cardiac Monitoring: Yes     
Cardiac Rhythm: Normal sinus rhythm, Paced Access:  
Current line(s): PIV Genitourinary:  
Urinary status: voiding and external catheter Respiratory:  
O2 Device: Nasal cannula Chronic home O2 use?: YES Incentive spirometer at bedside: YES 
  
GI: 
Last Bowel Movement Date: (pta) Current diet:  DIET NPO Passing flatus: YES Tolerating current diet: YES Pain Management:  
Patient states pain is manageable on current regimen: YES Skin: 
Oj Score: 16 Interventions: PT/OT consult Patient Safety: 
Fall Score: Total Score: 5 Interventions: bed/chair alarm High Fall Risk: Yes Length of Stay: 
Expected LOS: - - - Actual LOS: 2 Amalia Marti RN

## 2021-01-02 NOTE — PROGRESS NOTES
RAPID RESPONSE TEAM- Follow Up Rounded on patient due to recent transfer from CCU; admitted for hypoxic respiratory failure requiring high flow/BiPAP. Spoke to primary [employer]. No acute concerns at this time. VSS. On 5L NC. No RRT interventions indicated at this time. Please call with any questions or concerns. Marv Willson RN 
RRT Nilson Yarbrough

## 2021-01-02 NOTE — PROGRESS NOTES
Verbal shift change report given to marcelino (oncoming nurse) by Frannie Stevens (offgoing nurse). Report included the following information SBAR, Kardex, Intake/Output, MAR, Recent Results and Cardiac Rhythm paced. End of Shift Note Bedside shift change report given to eufemia (oncoming nurse) by Gary Milner (offgoing nurse). Report included the following information SBAR, Kardex, Intake/Output, MAR, Recent Results and Cardiac Rhythm paced Shift worked:  night Shift summary and any significant changes:  
  titrated to ^ L NC, administered one time dose of IM zyprexa, mitt restraints on 
  
Concerns for physician to address:  mentation/agitation/ mitt restraints Zone phone for oncoming shift:    
  
 
Activity: 
Activity Level: Bed Rest 
Number times ambulated in hallways past shift: 0 Number of times OOB to chair past shift: 0 Cardiac:  
Cardiac Monitoring: Yes     
Cardiac Rhythm: Normal sinus rhythm, Paced Access:  
Current line(s): PIV Genitourinary:  
Urinary status: voiding and external catheter Respiratory:  
O2 Device: Nasal cannula Chronic home O2 use?: YES Incentive spirometer at bedside: NO 
  
GI: 
Last Bowel Movement Date: (pta) Current diet:  DIET NPO Passing flatus: YES Tolerating current diet: YES Pain Management:  
Patient states pain is manageable on current regimen: YES Skin: 
Oj Score: 16 Interventions: float heels, PT/OT consult and internal/external urinary devices Patient Safety: 
Fall Score: Total Score: 5 Interventions: bed/chair alarm, gripper socks and stay with me (per policy) High Fall Risk: Yes Length of Stay: 
Expected LOS: - - - Actual LOS: 2 Gary Milner

## 2021-01-02 NOTE — CONSULTS
[address] [facility] Name:  Morales Moore 
MR#:  [id number] :  1940 ACCOUNT #:  [de-identified] DATE OF SERVICE:  2021 REQUESTING PHYSICIAN:  Hospital service. INDICATION:  Shortness of breath. The history and physical are taken from the chart as this was done virtually. The patient is seen through the window in his room otherwise because of COVID positivity and reduction of use of PPE supplies. CHIEF COMPLAINT:  Shortness of breath. HISTORY OF PRESENT ILLNESS:  The patient is an 75-year-old gentleman followed by Dr. Donald Renee in the clinic secondary to pulmonary fibrosis and chronic respiratory failure on nasal cannula and formerly declined therapy. He was brought in secondary to COVID and up to 5-6 L of nasal cannula, actually spent 2 days in the ICU. He is currently on 5 L nasal cannula. He was started on dexamethasone in addition to remdesivir and receiving vitamin C and zinc.  Some issues with sundowning has been throughout the hospital stay. MEDICAL HISTORY:  Fibrosis, hypercholesterolemia. SOCIAL HISTORY:  Former smoker. FAMILY HISTORY:  No lung disease. HOME MEDICATIONS:  Astelin, loratadine, vitamin D, albuterol, aspirin, Prilosec, and Advair. REVIEW OF SYSTEMS:  Not obtained secondary to COVID precautions. PHYSICAL EXAMINATION: 
VITAL SIGNS:  Temperature 99.2, pulse 79, respiratory rate 29, blood pressure 123/94, saturating 94% on 5 L nasal cannula. GENERAL:  Well-developed, well-nourished gentleman, mild tachypnea. NEUROLOGIC:  Appears intact. LABORATORY DATA:  White count 8.1, hemoglobin 15.5, platelet count of 121. D-dimer 1.26. Creatinine of 1.48 down from 1.88. Chest x-ray show patchy bilateral airspace disease. No CT scan was done. IMPRESSION: 
1. COVID pneumonia. 2.  Dementia. 3.  Pulmonary fibrosis. DISCUSSION AND PLAN: 
1.  I agree with dexamethasone and remdesivir. 2.  Antibiotics, possible superimposed pneumonia. 4.  Zinc and vitamin C. 
5.  Agree with current care. He is currently on nasal cannula, if any clinical worsening would reconsult ICU. 6.  The patient is receiving oral maximal therapy for his COVID pneumonia at this time. Agree with excellent care. I will see again on Monday. MD MARLEN Fischer/[id number] 
D:  01/02/2021 11:14 
T:  01/02/2021 15:39 
JOB #:  [id number]

## 2021-01-02 NOTE — PROGRESS NOTES
Anticoagulation COVID Dosing Enoxaparin Indication:  DVT ppx Wt Readings from Last 1 Encounters:  
12/30/20 71.8 kg (158 lb 4.6 oz) Ht Readings from Last 1 Encounters:  
12/30/20 182.9 cm (72\") Body mass index is 21.47 kg/m². Estimated Creatinine Clearance: 40.4 mL/min (A) (based on SCr of 1.48 mg/dL (H)). Estimated Creatinine Clearance (using IBW):43.7 mL/min Recent Labs 01/02/21 
2587 01/01/21 
6058 12/31/20 
5185 12/30/20 
2303 12/30/20 
2153 CREA 1.48* 1.88* 1.73* 1.75*  --   
ALB 3.1* 2.9* 3.0* 2.8*  --   
HGB 15.5 15.3 14.3  --  14.8 HCT 47.6 45.7 43.3  --  45.1  213 182  --  210 INR 1.0 1.1 1.1 1.1  --   
 
 
Plan:  
Group C Lower-intensity dosing protocol: 
Enoxaparin 40mg SQ q24h adjusted to Enoxaparin 30mg SQ q12h due to patient's  BMI < 30 and eCrCl > 30 Thank you, 
Rere Milton, [facility]

## 2021-01-03 NOTE — PROGRESS NOTES
Problem: Dysphagia (Adult) Goal: *Acute Goals and Plan of Care (Insert Text) Description: Speech pathology goals Initiated 1/3/2021 1. Patient will participate in re-evaluation of swallow function within 7 days Outcome: Not Met SPEECH LANGUAGE PATHOLOGY BEDSIDE SWALLOW EVALUATION Patient: Melisa Messer (19 y.o. male) Date: 1/3/2021 Primary Diagnosis: Acute respiratory failure due to COVID-19 (HCC) [U07.1, J96.00] Pulmonary edema [J81.1] Precautions: swallow ASSESSMENT : 
Patient confused, drowsy, and restless, however attempted to respond to SLP questions. Patient in mitts and moving his hands constantly. Based on the objective data described below, the patient presents with moderate oral and severe pharyngeal dysphagia. Patient with prolonged coughing, desat from 97% to 90% and increase in RR from 17 to 38 with single ice chip. Trials ended given severe response to one ice chip. Patient will benefit from skilled intervention to address the above impairments. Patients rehabilitation potential is considered to be Fair PLAN : 
Recommendations and Planned Interventions: 
-Continue NPO 
-Oral care 
-SLP to follow for swallowing re-evaluation as mental status improves Frequency/Duration: Patient will be followed by speech-language pathology 3 times a week to address goals. Discharge Recommendations: To Be Determined SUBJECTIVE:  
Patient with poor intelligibility. Per RN, patient eats soft food and drinks thick liquids at home. Patient with hiatal hernia and GERD, however family does not want surgery for this. He avoids all very hot and very cold foods to compensate. OBJECTIVE:  
 
Past Medical History:  
Diagnosis Date  Arthritis   
 parris knee  Asthma   
 dr Trejo Median 5/18/17  Basal cell carcinoma of skin 05/10/2017  
 right helix Jose David Baez's note rec'd  CAD (coronary artery disease)   
 dr Silvano Rodríguez  CKD (chronic kidney disease), stage III 05/2014 Carl Roneliezer notes 8/8/17 note and lab  Colon polyps 09/25/13  
 also dad with colon cancer  Contact dermatitis and other eczema, due to unspecified cause   
 eczema dr Vicki Blanco  COPD   
 dr Lauro Lesch chronic bronchitis     5/18/17  Elevated serum creatinine  Fall 11 & 12/2012 Right knee gave away and tripped in his house  GERD (gastroesophageal reflux disease)   
 dr Rios Joy (hard of hearing)  Hypercholesterolemia  ILD (interstitial lung disease) ([address] 07/15/2014 Dr Júnior Manley's   pulmonary fibrosis 5/18/17 f/u note  Proteinuria 10/2012  
 abou assi     12/2/16  RLS (restless legs syndrome)  Schatzki's ring 5/03  
 dilation dr Cata Vázquez  Screening for glaucoma 09/21/2016 Opal Leal note rec'd (annual eye exam)  Shingles 4/2012  Sleep apnea   
 uses CPAP- 7/6/17 f/u note [facility] note/sleep study 9/8/17 Past Surgical History:  
Procedure Laterality Date  ENDOSCOPY, COLON, DIAGNOSTIC    
 8/08 dr Mihai Kincaid repeat in 5 yrs  HX COLONOSCOPY  [id number]  
 brady- ileocecal valve polyp 5 yr repeat [facility]  Summer 2017 Right ear basal cell carcinoma  HX HERNIA REPAIR  07/20/2018 Open left inguinal hernia repair with mesh.  HX PACEMAKER  05/30/2017 Dr. Natalia Uriarte    
 left inq hernia  SC CARDIAC SURG PROCEDURE UNLIST  05/30/2017 Pacemaker  SC EXTRAC ERUPTED TOOTH/EXPOSED ROOT  2017  
 3 teeth extraction. Prior Level of Function/Home Situation:  
  
Diet prior to admission: soft foods and thick liquids Current Diet:  NPO Cognitive and Communication Status: 
Neurologic State: Drowsy, Confused, Restless Orientation Level: Unable to verbalize Cognition: Decreased command following, Decreased attention/concentration Oral Assessment: 
Oral Assessment Dentition: Natural 
Oral Hygiene: dry oral mucosa, mouth open at rest 
P.O. Trials: Patient Position: upright in bed Vocal quality prior to P.O.: Hoarse Consistency Presented: Ice chips How Presented: SLP-fed/presented;Spoon Bolus Acceptance: Impaired Bolus Formation/Control: Impaired Type of Impairment: Delayed Propulsion: Delayed (# of seconds) Oral Residue: None Initiation of Swallow: Delayed (# of seconds) Laryngeal Elevation: Decreased Aspiration Signs/Symptoms: Strong cough; Decrease in O2 saturations; Increase in RR Pharyngeal Phase Characteristics: Multiple swallows; Suspected pharyngeal residue Oral Phase Severity: Moderate Pharyngeal Phase Severity : Severe NOMS:  
The NOMS functional outcome measure was used to quantify this patient's level of swallowing impairment. Based on the NOMS, the patient was determined to be at level 1 for swallow function NOMS Swallowing Levels: 
Level 1 (CN): NPO Level 2 (CM): NPO but takes consistency in therapy Level 3 (CL): Takes less than 50% of nutrition p.o. and continues with nonoral feedings; and/or safe with mod cues; and/or max diet restriction Level 4 (CK): Safe swallow but needs mod cues; and/or mod diet restriction; and/or still requires some nonoral feeding/supplements Level 5 (CJ): Safe swallow with min diet restriction; and/or needs min cues Level 6 (CI): Independent with p.o.; rare cues; usually self cues; may need to avoid some foods or needs extra time Level 7 (CH): Independent for all p.o. GOLD. (2003). National Outcomes Measurement System (NOMS): Adult Speech-Language Pathology User's Guide. Pain: 
Pain Scale 1: Numeric (0 - 10) Pain Intensity 1: 0 After treatment:  
Patient left in no apparent distress in bed, Call bell within reach, Nursing notified, and Safety precautions posted at beside. COMMUNICATION/EDUCATION:  
The patient's plan of care including recommendations, planned interventions, and recommended diet changes were discussed with: Registered nurse. Patient is unable to participate in goal setting and plan of care. Thank you for this referral. 
GURJIT Gutierrez Time Calculation: 15 mins

## 2021-01-03 NOTE — PROGRESS NOTES
Speech pathology Orders received, spoke with RN. SLP will be available late morning/early afternoon to see patient.   
Blake Lundy M.S., CCC-SLP

## 2021-01-03 NOTE — PROGRESS NOTES
. 
 
 
Hospitalist Progress Note NAME: Miguel Hunter :  1940 MRN:  [id number] Assessment / Plan: 
Spoke with daughter Sees Pulmonary associates for IPF/COPD Continues with Brovana and Budesonide 0.5 mg 
detailed regarding benefits and risk of antiviral treatment, including kidney and liver injury and were agreeable to treatment Acute on chronic hypoxic respiratory failure usually on 3 liters HOT 
COVID pneumonia Pulmonary fibrosis CRP appears to have peaked and started to improve now bellow 5.5 
- cont dexamethasone, vitamin C and Zinc 
- stared on remdesivir 2021 
- cont azithromycin and ceftriaxone ARAVIND/CKD stage III  Improved initally now with poor oral intake worsening Creatinine in .3+ and earlier this month 1.92 Will start on IV fluids 
- avoid nephrotoxic drugs 
- will continue to monitor 
- kept NPO as he has aspiration risk with his drowsiness - Speech to reassess in the a.m. Dementia 
sundowning 
- will maintain on Zyprexa PRN only HS (appears too sedated today) 
- one time dose of lorazepam ordered 2021 Restless leg syndrome SSS s/p PPM 
Schatzki ring 18.5 - 24.9 Normal weight / Body mass index is 21.47 kg/m². Code status: DNR confirmed with daughter Prophylaxis: Hep SQ Recommended Disposition: SNF/LTC Subjective: Chief Complaint / Reason for Physician Visit Mr. Storm had received olanzapine and he was too sedated to communicate with me todasy. Continuing follow of Mr. Storm with covid pneumonia, and Lung fibrosis. Discussed with RN events overnight. Review of Systems: 
Symptom Y/N Comments  Symptom Y/N Comments Fever/Chills    Chest Pain Poor Appetite    Edema Cough    Abdominal Pain Sputum    Joint Pain SOB/CORRIGAN    Pruritis/Rash Nausea/vomit    Tolerating PT/OT Diarrhea    Tolerating Diet Constipation    Other Could NOT obtain due to: confused Objective: VITALS:  
 Last 24hrs VS reviewed since prior progress note. Most recent are: 
Patient Vitals for the past 24 hrs: 
 Temp Pulse Resp BP SpO2  
01/03/21 0937  (!) 59 16  100 % 01/03/21 0759 97.8 °F (36.6 °C) 64 19 118/76 96 % 01/03/21 0729  63 17 (!) 132/108 (!) 86 % 01/03/21 0710  64 23  96 % 01/03/21 0620  70 25  91 % 01/03/21 0444  66 19  99 % 01/03/21 0345  62 19  97 % 01/03/21 0312 97.4 °F (36.3 °C) 74 (!) 36 108/86 94 % 01/03/21 0240  65 25  95 % 01/03/21 0140  60 17  98 % 01/03/21 0045  64 24  97 % 01/02/21 2329  64 28  97 % 01/02/21 2232 97.7 °F (36.5 °C) 70 26 108/70 98 % 01/02/21 2230  77 24  97 % 01/02/21 2135  91 30  92 % 01/02/21 2129  90 (!) 50  91 % 01/02/21 2030  68 29  93 % 01/02/21 1932 97.9 °F (36.6 °C) 65 30 106/66 92 % 01/02/21 1608 99.2 °F (37.3 °C) 75 (!) 34 (!) 168/99 (!) 87 % Intake/Output Summary (Last 24 hours) at 1/3/2021 1343 Last data filed at 1/2/2021 3712 Gross per 24 hour Intake  Output 450 ml Net -450 ml I had a face to face encounter and independently examined this patient on 1/3/2021, as outlined below: PHYSICAL EXAM: 
General: chonicaly ill appearing, cachectic, drowsy EENT:  EOMI. Anicteric sclerae. Mucous membranes dry Resp:  Bilateral subscapular fine crackles, no wheezing or rales. No accessory muscle use CV:  Regular  rhythm,  No edema GI:  Soft, Non distended, Non tender. +Bowel sounds Neurologic:  Alert and disoriented (demented), speech is muffled as his mucous membranes are very dry, Psych:   Poor insight. Not anxious nor agitated Skin:  No rashes. No jaundice Reviewed most current lab test results and cultures  YES Reviewed most current radiology test results   YES Review and summation of old records today    NO Reviewed patient's current orders and MAR    YES 
PMH/SH reviewed - no change compared to H&P 
________________________________________________________________________ Care Plan discussed with: 
  Comments Patient x Family RN x Care Manager Consultant Multidiciplinary team rounds were held today with , nursing, pharmacist and clinical coordinator. Patient's plan of care was discussed; medications were reviewed and discharge planning was addressed. ________________________________________________________________________ Total NON critical care TIME: 23  Minutes Total CRITICAL CARE TIME Spent:   Minutes non procedure based Comments >50% of visit spent in counseling and coordination of care x   
________________________________________________________________________ Apryl Fox MD  
 
Procedures: see electronic medical records for all procedures/Xrays and details which were not copied into this note but were reviewed prior to creation of Plan. LABS: 
I reviewed today's most current labs and imaging studies. Pertinent labs include: 
Recent Labs 01/03/21 
7655 01/02/21 
0315 01/01/21 
0540 WBC 7.3 8.1 8.8 HGB 16.4 15.5 15.3 HCT 50.4* 47.6 45.7  206 213 Recent Labs 01/03/21 
1454 01/02/21 
0315 01/01/21 
0540 * 142 139  
K 5.1 4.5 4.2 * 112* 107 CO2 26 26 27 GLU 89 79 72 BUN 65* 56* 60* CREA 1.73* 1.48* 1.88* CA 9.2 9.0 8.7 MG 2.8* 2.3 2.2 PHOS 4.1 3.4 3.9 ALB 3.1* 3.1* 2.9* TBILI 0.6 0.8 0.5 ALT 22 19 17 INR 1.1 1.0 1.1 Signed: Apryl Fox MD

## 2021-01-03 NOTE — PROGRESS NOTES
Verbal shift change report given to marcelino (oncoming nurse) by Aiden Oneil (offgoing nurse). Report included the following information SBAR, Kardex, Intake/Output, MAR, Recent Results and Cardiac Rhythm paced. End of Shift Note Bedside shift change report given to eufemia(oncoming nurse) by Ramos Bailey (offgoing nurse). Report included the following information SBAR, Kardex, Intake/Output, MAR, Recent Results and Cardiac Rhythm paced Shift worked:  night Shift summary and any significant changes:  
  VSS, on 5L NC, mitt restraints intact. Zyprexa prn given x 2 for agitation & restlessness. Concerns for physician to address:  mentation, o2 requirements Zone phone for oncoming shift:  [phone] Activity: 
Activity Level: Bed Rest 
Number times ambulated in hallways past shift: 0 Number of times OOB to chair past shift: 0 Cardiac:  
Cardiac Monitoring: Yes     
Cardiac Rhythm: Normal sinus rhythm, Paced Access:  
Current line(s): PIV Genitourinary:  
Urinary status: incontinent and external catheter Respiratory:  
O2 Device: Nasal cannula Chronic home O2 use?: YES Incentive spirometer at bedside: NO 
  
GI: 
Last Bowel Movement Date: (pta) Current diet:  DIET NPO Passing flatus: YES Tolerating current diet: YES Pain Management:  
Patient states pain is manageable on current regimen: YES Skin: 
Oj Score: 15 Interventions: foam dressing, PT/OT consult, limit briefs and internal/external urinary devices Patient Safety: 
Fall Score: Total Score: 5 Interventions: bed/chair alarm and gripper socks High Fall Risk: Yes Length of Stay: 
Expected LOS: - - - Actual LOS: 3 Ramos Bailey

## 2021-01-03 NOTE — PROGRESS NOTES
Bedside and Verbal shift change report given to [address] (oncoming nurse) by Myaa Treviño (offgoing nurse). Report included the following information SBAR, Kardex, Intake/Output, Recent Results, Med Rec Status and Quality Measures. End of Shift Note Bedside shift change report given to juarez (oncoming nurse) by Brooklyn Stauffer RN (offgoing nurse). Report included the following information SBAR, Kardex, Intake/Output and Quality Measures Shift worked:  day Shift summary and any significant changes:  
  Na Concerns for physician to address:  NA 
  
Zone phone for oncoming shift:    
  
 
Activity: 
Activity Level: Bed Rest 
Number times ambulated in hallways past shift: 0 Number of times OOB to chair past shift: 0 Cardiac:  
Cardiac Monitoring: Yes     
Cardiac Rhythm: Normal sinus rhythm, Paced Access:  
Current line(s): PIV Genitourinary:  
Urinary status: voiding and external catheter Respiratory:  
O2 Device: Nasal cannula Chronic home O2 use?: YES Incentive spirometer at bedside: YES 
  
GI: 
Last Bowel Movement Date: (pta) Current diet:  DIET NPO Passing flatus: YES Tolerating current diet: YES Pain Management:  
Patient states pain is manageable on current regimen: N/A Skin: 
Oj Score: 15 Interventions: float heels and PT/OT consult Patient Safety: 
Fall Score: Total Score: 4 Interventions: bed/chair alarm High Fall Risk: Yes Length of Stay: 
Expected LOS: - - - Actual LOS: 3 Brooklyn Stauffer RN

## 2021-01-04 NOTE — PROGRESS NOTES
Speech path Patient was too lethargic for po today. Sedating meds given last night. nsg tried to feed but it was not safe or successful. She had to clear his mouth of the purees. We will recommend discontinuing the diet.   
Cici Mcarthur, SLP

## 2021-01-04 NOTE — PROGRESS NOTES
Received message from patient's nurse [facility] stating : 
 
Patient has large mass in bladder area- (has HX of hernias). Noticed at change of shift, painful when pressed on. Bladder scanned patient for 156ml in bladder, patient is voiding small amounts in condom cath. If you are around, could you come take a quick peek at it? Discussion / orders: 
 
Evaluated patient. Noted a very large area of masslike consistency to right distal groin up to lower abdomen area. This area is very tender to palpation. Reviewing patient's medical record I do note that patient does have history of hiatal and inguinal hernia. He had a CT of abdomen with contrast back in 2018 which reported: \"Left inguinal hernia contains a loop of small bowel with partial obstruction and also contains a portion of the urinary bladder which extends into the scrotum\" He again had a CT of abdomen with pelvis July 2020 which reported right inguinal hernia without any evidence of obstruction and a large hiatal hernia. Patient does have hypoactive bowel sounds however he has not been eating much and has been recently made n.p.o. due to aspiration risks and started on IV fluids. · Ordered stat CT of abdomen pelvis with contrast 
  
 
 
 
Please note that this note was dictated using Dragon computer voice recognition software. Quite often unanticipated grammatical, syntax, homophones, and other interpretive errors are inadvertently transcribed by the computer software. Please disregard these errors. Please excuse any errors that have escaped final proofreading.

## 2021-01-04 NOTE — PROGRESS NOTES
. 
 
 
Hospitalist Progress Note NAME: Melisa Messer :  1940 MRN:  [id number] Assessment / Plan: 
Spoke with daughter Sees Pulmonary associates for IPF/COPD Continues with Brovana and Budesonide 0.5 mg 
detailed regarding benefits and risk of antiviral treatment, including kidney and liver injury and were agreeable to treatment Acute on chronic hypoxic respiratory failure usually on 3 liters HOT 
COVID pneumonia Pulmonary fibrosis CRP appears to have peaked at 11.3 and started to improve now 7.25 
- cont dexamethasone, vitamin C and Zinc 
- stared on remdesivir 2021 
- cont azithromycin and ceftriaxone ARAVIND/CKD stage III  Improved initally now with poor oral intake worsening Creatinine in .3+ and earlier this month 1.92 Dehydration Poor oral intake Will continue on IV fluids 
- avoid nephrotoxic drugs 
- will continue to monitor 
- kept NPO as he has aspiration risk with his drowsiness - Speech to reassess in the a.m. 
- please maintain good oral hygiene 
- continue to reassess swallowing and encourage oral intake Dementia 
owning 
- was kept on PRN Zyprexa but appears too sedated in the last 2 days. - will avoid sedating medications 
- one time dose of lorazepam ordered 2021 Restless leg syndrome SSS s/p PPM 
Schatzki ring 18.5 - 24.9 Normal weight / Body mass index is 21.47 kg/m². Code status: DNR confirmed with daughter Prophylaxis: Hep SQ Recommended Disposition: SNF/LTC Subjective: Chief Complaint / Reason for Physician Visit Mr. Ayaka Magaña had received olanzapine and he was too sedated to communicate with me todasy. Continuing follow of Mr. Ayaka Magaña with covid pneumonia, and Lung fibrosis. Discussed with RN events overnight. Review of Systems: 
Symptom Y/N Comments  Symptom Y/N Comments Fever/Chills    Chest Pain Poor Appetite    Edema Cough    Abdominal Pain Sputum    Joint Pain SOB/CORRIGAN    Pruritis/Rash Nausea/vomit    Tolerating PT/OT Diarrhea    Tolerating Diet Constipation    Other Could NOT obtain due to: confused Objective: VITALS:  
Last 24hrs VS reviewed since prior progress note. Most recent are: 
Patient Vitals for the past 24 hrs: 
 Temp Pulse Resp BP SpO2  
01/04/21 1135  67 (!) 31  90 % 01/04/21 1105 97.5 °F (36.4 °C) 61 20 (!) 142/80 94 % 01/04/21 0731 97.8 °F (36.6 °C) 60 19 (!) 145/82 98 % 01/04/21 0349 97.9 °F (36.6 °C) 75 21 129/76 97 % 01/03/21 2307 97.1 °F (36.2 °C) 61 19 103/71 100 % 01/03/21 2002 97.8 °F (36.6 °C) 60 24 136/81 100 % Intake/Output Summary (Last 24 hours) at 1/4/2021 1708 Last data filed at 1/4/2021 4932 Gross per 24 hour Intake 0 ml Output 500 ml Net -500 ml I had a face to face encounter and independently examined this patient on 1/4/2021, as outlined below: PHYSICAL EXAM: 
General: chonicaly ill appearing, cachectic, drowsy EENT:  EOMI. Anicteric sclerae. Mucous membranes dry Resp:  Bilateral subscapular fine crackles, no wheezing or rales. No accessory muscle use CV:  Regular  rhythm,  No edema GI:  Soft, Non distended, Non tender. +Bowel sounds Neurologic:  Alert and disoriented (demented), speech is muffled as his mucous membranes are very dry, Psych:   Poor insight. Not anxious nor agitated Skin:  No rashes. No jaundice Reviewed most current lab test results and cultures  YES Reviewed most current radiology test results   YES Review and summation of old records today    NO Reviewed patient's current orders and MAR    YES 
PMH/SH reviewed - no change compared to H&P 
________________________________________________________________________ Care Plan discussed with: 
  Comments Patient x Family RN x Care Manager Consultant Multidiciplinary team rounds were held today with , nursing, pharmacist and clinical coordinator. Patient's plan of care was discussed; medications were reviewed and discharge planning was addressed. ________________________________________________________________________ Total NON critical care TIME: 24  Minutes Total CRITICAL CARE TIME Spent:   Minutes non procedure based Comments >50% of visit spent in counseling and coordination of care x   
________________________________________________________________________ Cyril Pedro MD  
 
Procedures: see electronic medical records for all procedures/Xrays and details which were not copied into this note but were reviewed prior to creation of Plan. LABS: 
I reviewed today's most current labs and imaging studies. Pertinent labs include: 
Recent Labs 01/04/21 
1353 01/03/21 
2325 01/02/21 
0315 WBC 7.5 7.3 8.1 HGB 15.9 16.4 15.5 HCT 50.3 50.4* 47.6  199 206 Recent Labs 01/04/21 
0049 01/03/21 
0939 01/02/21 
0315 * 148* 142  
K 5.1 5.1 4.5  
* 116* 112* CO2 25 26 26 * 89 79 BUN 65* 65* 56* CREA 1.60* 1.73* 1.48* CA 8.7 9.2 9.0 MG 2.6* 2.8* 2.3 PHOS 3.7 4.1 3.4 ALB 2.8* 3.1* 3.1* TBILI 0.7 0.6 0.8 ALT 22 22 19 INR 1.1 1.1 1.0 Signed: Cyril Pedro MD

## 2021-01-04 NOTE — PROGRESS NOTES
Bedside and Verbal shift change report given to eufemia (oncoming nurse) by Michele Ramos (offgoing nurse). Report included the following information SBAR, Kardex, Intake/Output and Quality Measures.

## 2021-01-04 NOTE — PROGRESS NOTES
Received message from patient's nurse Juel Barthel stating : 
 
Pt. restless, unable to settle, yelling out at this time. Patient received 2.5 Zyprexa IM for agitation at 2151. I have no other PRNs available, can I get something to help ? Thank you! Discussion / orders: 
 
Yelling out likely due to pain. Ordered dilaudid 1 mg iv now to see if improves condition Please note that this note was dictated using Dragon computer voice recognition software. Quite often unanticipated grammatical, syntax, homophones, and other interpretive errors are inadvertently transcribed by the computer software. Please disregard these errors. Please excuse any errors that have escaped final proofreading.

## 2021-01-04 NOTE — PROGRESS NOTES
Patient has what appears to be a moderate sized mass on the right side of his lower abdomen directly above his pelvic region. Appears to be painful when palpated. Patient bladder scanned for precautionary reasons, found to have 156ml in bladder. Patient currently has condom catheter on and it is patent and draining. JONATHAN Meyers on call paged to come assess the mass. Awaiting response at this time. . 
 
2115: JONATHAN Meyers ordered stat CT of chest/abdomen at this time. Patient extremely agitated, swatting at staff despite having soft mitts on as well as kicking at staff and resisting care. Patient given PRN zyprexa at this time. 2300: CT resulted, see result description. Patient not agitated at this time, resting quietly with respirations even and unlabored. End of Shift Note Bedside shift change report given to [address] [place] [address] (oncoming nurse) by Edyta Vasquez (offgoing nurse). Report included the following information SBAR, ED Summary, MAR, Recent Results and Cardiac Rhythm NSR, paced Shift worked:  night Shift summary and any significant changes:  
  Large mass on right lower abdomen, tender to palpation. Patient still able t void, bladder scanned for precautionary reasons (156ml). Mira assessed, ordered stat CT of abd/pelvis. Patient agitated x2, given zyprexa IM and x1 order from NP for 1mg dilaudid. Concerns for physician to address:  see above Zone phone for oncoming shift:    
  
 
Activity: 
Activity Level: Bed Rest 
Number times ambulated in hallways past shift: 0 Number of times OOB to chair past shift: 0 Cardiac:  
Cardiac Monitoring: Yes     
Cardiac Rhythm: Normal sinus rhythm, Paced Access:  
Current line(s): PIV Genitourinary:  
Urinary status: external catheter Respiratory:  
O2 Device: Nasal cannula Chronic home O2 use?: YES Incentive spirometer at bedside: N/A 
  
GI: 
Last Bowel Movement Date: (PTA) Current diet:  DIET NPO Passing flatus: YES 
 Tolerating current diet: YES Pain Management:  
Patient states pain is manageable on current regimen: YES Skin: 
Oj Score: 14 Interventions: float heels, foam dressing and internal/external urinary devices Patient Safety: 
Fall Score: Total Score: 4 Interventions: bed/chair alarm, pt to call before getting OOB and stay with me (per policy) High Fall Risk: Yes Length of Stay: 
Expected LOS: - - - Actual LOS: 4 Marie Lesches

## 2021-01-04 NOTE — PROGRESS NOTES
Chart reviewed VSS sats 97-98% Pt on NC O2 with adequate sats Pt on remdesivir and decadron Pt on empiric abx Will follow DEONTE Renee MD

## 2021-01-05 NOTE — PROGRESS NOTES
End of Shift Note Bedside shift change report given to Suzi Lopes (oncoming nurse) by Xin Cannon (offgoing nurse). Report included the following information SBAR, ED Summary, Intake/Output, MAR, Recent Results and Cardiac Rhythm NSR, paced Shift worked:  night Shift summary and any significant changes:  
  Patient more alert tonight, had eyes open. Still has garbled speech and difficult to understand but patient is trying to communicate more. Frequent mouth care done, no sedating medications needed overnight. Concerns for physician to address:  Family is concerned patient is NPO and not receiving Remeron at night. Zone phone for oncoming shift:    
  
 
Activity: 
Activity Level: Bed Rest 
Number times ambulated in hallways past shift: 0 Number of times OOB to chair past shift: 0 Cardiac:  
Cardiac Monitoring: Yes     
Cardiac Rhythm: Normal sinus rhythm, Paced Access:  
Current line(s): PIV Genitourinary:  
Urinary status: external catheter Respiratory:  
O2 Device: Nasal cannula Chronic home O2 use?: YES Incentive spirometer at bedside: N/A 
  
GI: 
Last Bowel Movement Date: (PTA) Current diet:  DIET NPO Passing flatus: YES Tolerating current diet: YES Pain Management:  
Patient states pain is manageable on current regimen: YES Skin: 
Oj Score: 14 Interventions: float heels and internal/external urinary devices Patient Safety: 
Fall Score: Total Score: 4 Interventions: gripper socks, pt to call before getting OOB and stay with me (per policy) High Fall Risk: Yes Length of Stay: 
Expected LOS: - - - Actual LOS: 5 Xin Cannon

## 2021-01-05 NOTE — PROGRESS NOTES
. 
 
 
Hospitalist Progress Note NAME: Eleni Todd :  1940 MRN:  [id number] Assessment / Plan: 
Spoke with daughter Sees Pulmonary associates for IPF/COPD Continues with Brovana and Budesonide 0.5 mg 
detailed regarding benefits and risk of antiviral treatment, including kidney and liver injury and were agreeable to treatment Acute on chronic hypoxic respiratory failure usually on 3 liters HOT 
COVID pneumonia Pulmonary fibrosis 
- cont dexamethasone (to complete 10 days), vitamin C and Zinc 
- stared on remdesivir 2021, today is the last day - s/p azithromycin,ceftriaxone today is the last day ARAVIND/CKD stage III  Improved initally now with poor oral intake worsening Creatinine in .3+ and earlier this month 1.92 Dehydration Poor oral intake Change IV fluids to D5 
- avoid nephrotoxic drugs 
- will continue to monitor 
- kept NPO as he has aspiration risk with his drowsiness - Speech eval 
- please maintain good oral hygiene 
- continue to reassess swallowing and encourage oral intake Dementia 
 
- was kept on PRN Zyprexa but appears too sedated in the last 2 days. - will avoid sedating medications 
- one time dose of lorazepam ordered 2021 Restless leg syndrome SSS s/p PPM 
Schatzki ring 18.5 - 24.9 Normal weight / Body mass index is 21.47 kg/m². Code status: DNR confirmed with daughter Prophylaxis: Hep SQ Recommended Disposition: SNF/LTC Subjective: Chief Complaint / Reason for Physician Visit Mr. Watson Grubbs had received olanzapine and he was too sedated to communicate with me todasy. Continuing follow of Mr. Chaudhari Record with covid pneumonia, and Lung fibrosis. Discussed with RN events overnight. Patient was seen and examined. No acute events overnight. Review of Systems: 
Symptom Y/N Comments  Symptom Y/N Comments Fever/Chills    Chest Pain Poor Appetite    Edema Cough    Abdominal Pain Sputum    Joint Pain    
SOB/CORRIGAN    Pruritis/Rash    
Nausea/vomit    Tolerating PT/OT    
Diarrhea    Tolerating Diet    
Constipation    Other    
 
Could NOT obtain due to: confused  
 
Objective:  
 
VITALS:  
Last 24hrs VS reviewed since prior progress note. Most recent are: 
Patient Vitals for the past 24 hrs: 
 Temp Pulse Resp BP SpO2  
01/05/21 0829 97.6 °F (36.4 °C) 60 24 126/63 97 %  
01/05/21 0328 98.2 °F (36.8 °C) 60 23 132/85 97 %  
01/04/21 2348 98.7 °F (37.1 °C) 66 21 136/79 96 %  
01/04/21 1943 98.8 °F (37.1 °C) 61 17 114/74 98 %  
01/04/21 1135 — 67 (!) 31 — 90 %  
01/04/21 1105 97.5 °F (36.4 °C) 61 20 (!) 142/80 94 %  
 
 
Intake/Output Summary (Last 24 hours) at 1/5/2021 1002 
Last data filed at 1/5/2021 0730 
Gross per 24 hour  
Intake 1178.75 ml  
Output 1600 ml  
Net -421.25 ml  
  
 
I had a face to face encounter and independently examined this patient on 1/5/2021, as outlined below: 
PHYSICAL EXAM: 
General: chonicaly ill appearing, cachectic, drowsy 
EENT:  EOMI. Anicteric sclerae. Mucous membranes dry 
Resp:  Bilateral subscapular fine crackles, no wheezing or rales.  No accessory muscle use 
CV:  Regular  rhythm,  No edema 
GI:  Soft, Non distended, Non tender.  +Bowel sounds 
Neurologic:  Alert and disoriented (demented), speech is muffled as his mucous membranes are very dry,  
Psych:   Poor insight. Not anxious nor agitated 
Skin:  No rashes.  No jaundice 
 
Reviewed most current lab test results and cultures  YES 
Reviewed most current radiology test results   YES 
Review and summation of old records today    NO 
Reviewed patient's current orders and MAR    YES 
PMH/SH reviewed - no change compared to H&P 
________________________________________________________________________ 
Care Plan discussed with: 
  Comments  
Patient x   
Family     
RN x   
Care Manager    
Consultant     
 Multidiciplinary team rounds were held today with , nursing, pharmacist and clinical coordinator. Patient's plan of care was discussed; medications were reviewed and discharge planning was addressed. ________________________________________________________________________ Total NON critical care TIME: 35  Minutes Total CRITICAL CARE TIME Spent:   Minutes non procedure based Comments >50% of visit spent in counseling and coordination of care    
________________________________________________________________________ Leticia Calzada MD  
 
Procedures: see electronic medical records for all procedures/Xrays and details which were not copied into this note but were reviewed prior to creation of Plan. LABS: 
I reviewed today's most current labs and imaging studies. Pertinent labs include: 
Recent Labs 01/05/21 
8160 01/04/21 
3731 01/03/21 
4306 WBC 8.3 7.5 7.3 HGB 16.3 15.9 16.4 HCT 51.7* 50.3 50.4*  188 199 Recent Labs 01/05/21 
4237 01/04/21 
9541 01/03/21 
3009 * 149* 148*  
K 5.0 5.1 5.1 * 120* 116* CO2 28 25 26 GLU 88 122* 89 BUN 60* 65* 65* CREA 1.71* 1.60* 1.73* CA 9.1 8.7 9.2 MG 2.6* 2.6* 2.8* PHOS 3.1 3.7 4.1 ALB 2.8* 2.8* 3.1* TBILI 0.6 0.7 0.6 ALT 22 22 22 INR 1.1 1.1 1.1 Signed: Leticia Calzada MD

## 2021-01-05 NOTE — PROGRESS NOTES
0700H- Verbal shift change report given to ROSHAN (oncoming nurse) by Jes Grace (offgoing nurse). Report included the following information SBAR, Kardex, ED Summary, OR Summary, Procedure Summary, Intake/Output, MAR and Recent Results. 0830H- Seen and examine dby Dr. Laura Denise no new orders was made. 1500H- Seen and examined by SLP patient failed on swallowing test. 
 
End of Shift Note Bedside shift change report given to LUCILA (oncoming nurse) by Edvin Miner (offgoing nurse). Report included the following information SBAR, Kardex, ED Summary, Procedure Summary, Intake/Output, MAR and Recent Results Shift worked:  0700- 1900 Shift summary and any significant changes: NONE OF THIS TIME Concerns for physician to address:  TRANSFER AND DIET Zone phone for oncoming shift:   
  
 
Activity: 
Activity Level: Bed Rest 
Number times ambulated in hallways past shift: 0 Number of times OOB to chair past shift: 0 Cardiac:  
Cardiac Monitoring: Yes     
Cardiac Rhythm: Normal sinus rhythm, Paced Access:  
Current line(s): PIV Genitourinary:  
Urinary status: external catheter Respiratory:  
O2 Device: Nasal cannula Chronic home O2 use?: YES Incentive spirometer at bedside: N/A 
  
GI: 
Last Bowel Movement Date: (PTA) Current diet:  DIET NPO Passing flatus: YES Tolerating current diet: YES 
% Diet Eaten: 0 % Pain Management:  
Patient states pain is manageable on current regimen: N/A Skin: 
Oj Score: 11 Interventions: PT/OT consult Patient Safety: 
Fall Score: Total Score: 4 Interventions: pt to call before getting OOB High Fall Risk: Yes Length of Stay: 
Expected LOS: 5d 12h Actual LOS: 5 [facility]

## 2021-01-05 NOTE — PROGRESS NOTES
Reason for Admission: Patient transferred from ccu to pcu. He came in with sob and per daughter he was more confused. PMHX significant for pulmonary fibrosis and copd. RUR Score:  20% PCP: First and Last name:  Lisa Street NP Name of Practice:   [facility]. Are you a current patient: Yes/No:  Yes Approximate date of last visit:  Two weeks ago. Can you participate in a virtual visit if needed:  Yes Do you (patient/family) have any concerns for transition/discharge? No 
    
           
Plan for utilizing home health:   Patient has been to [facility] and rehab in the past.  
 
 
Current Advanced Directive/Advance Care Plan:  Patient does not have Advance Care Plan. He is confused at this time and unable to complete one. He has two daughters. Transition of Care Plan:  Spoke with patient's daughter secondary to patient having dementa. Per daughter patient lives in two story home on the first floor. He has aides who come in for five hours 4-5 days a week to assist . Daughter states when the aides are not there either his daughters or son will be there and one of them will spend the night. She states patient was able to feed and bath himself at home. Family drove him to his appointments. Per daughter before patient became ill he was going to move into [address] [place] [address]. Judy Carvajal RN BSN CRM  [phone]

## 2021-01-05 NOTE — PROGRESS NOTES
Problem: Dysphagia (Adult) Goal: *Acute Goals and Plan of Care (Insert Text) Description: Speech pathology goals Initiated 1/3/2021 1. Patient will participate in re-evaluation of swallow function within 7 days Outcome: Not Met SPEECH LANGUAGE PATHOLOGY DYSPHAGIA TREATMENT Patient: Ronit Lundberg (66 y.o. male) Date: 1/5/2021 Diagnosis: Acute respiratory failure due to COVID-19 (HCC) [U07.1, J96.00] Pulmonary edema [J81.1] <principal problem not specified> Precautions:    
 
ASSESSMENT: 
Patient is still too confused for po trials and has severe pharyngeal dysphagia. He did not accept the ice chip well but allowed it to be put in his mouth. There was some oral manipulation. Swallow was audible and with weak hyolaryngeal excursion. Delayed cough noted after the ice and he coughed up some thick tan secretions from his oropharynx. He needed extensive oral care for dried wet secretions on his buccal area bilaterally. He had thick dried tan secretions on his velum. With some time and effort that was all cleared with suction. He was verbalizing but confused and unintelligible. Research regarding PEG tube feeding in the setting of advanced dementia as potential benefits of tube feeding do not outweigh the associated treatment burdens. Research shows that in patients with advanced dementia PEG tube placement is associated with substantial patient burdens including recurrent and new onset aspiration, tube-associated and aspiration-related infection, increased oral secretions that are difficult to manage, discomfort, tube malfunction, pressure wounds, and the use of physical and chemical restraints. Furthermore, in the geriatric population, research shows that there is no proven benefit in weight gain or markers of nutrition (albumin, prealbumin) in patients with malnutrition due to impaired oral intake. PLAN: 
Recommendations and Planned Interventions: NPO 
 Patient continues to benefit from skilled intervention to address the above impairments. Continue treatment per established plan of care. Discharge Recommendations:  None SUBJECTIVE:  
Patient was verbalizing but with very poor intelligibility. OBJECTIVE:  
Cognitive and Communication Status: 
Neurologic State: Confused Orientation Level: Other (Comment) Cognition: Impaired decision making Dysphagia Treatment: 
Oral Assessment: 
Oral Assessment Labial: No impairment Dentition: Intact; Natural 
Lingual: Other (comment) Velum: Other (comment) Mandible: No impairment P.O. Trials: 
Patient Position: upright in bed Vocal quality prior to P.O.: Hoarse; Wet Consistency Presented: Ice chips How Presented: Spoon;SLP-fed/presented Bolus Acceptance: Impaired Bolus Formation/Control: Impaired Type of Impairment: Delayed Propulsion: Delayed (# of seconds) Oral Residue: None Initiation of Swallow: Delayed (# of seconds) Laryngeal Elevation: Decreased Aspiration Signs/Symptoms: Strong cough Oral Phase Severity: Mild-moderate Pharyngeal Phase Severity : Severe Pain: 
Pain Scale 1: Visual 
Pain Intensity 1: 0 After treatment:  
Patient left in no apparent distress sitting up in chair COMMUNICATION/EDUCATION:  
Patient was educated regarding his deficit(s) of dysphagia but he cannot understand it. The patient's plan of care including recommendations, planned interventions, and recommended diet changes were discussed with: Registered nurse. GURJIT Chavez Time Calculation: 15 mins

## 2021-01-06 NOTE — PROGRESS NOTES
. 
 
 
Hospitalist Progress Note NAME: Santino Bullard :  1940 MRN:  [id number] Assessment / Plan: 
 
 
 
Acute on chronic hypoxic respiratory failure usually on 3 liters HOT 
COVID pneumonia Pulmonary fibrosis 
- cont dexamethasone (to complete 10 days with last day ), vitamin C and Zinc 
- s/p remdesivir completed  
- s/p azithromycin,ceftriaxone today is the last day 
- on 2 L NC, wean off O2 to keep > 92% ARAVIND/CKD stage III  Improved initally now with poor oral intake worsening Creatinine in July 1.3+ and earlier this month 1.92 Dehydration Poor oral intake Change IV fluids to D5 
- avoid nephrotoxic drugs 
- will continue to monitor 
- kept NPO as he has aspiration risk with his drowsiness - Speech eval--> npo - please maintain good oral hygiene 
-Discussed with the patient daughter over the phone and she reported that the patient had issues with swallowing started in 2020 for which she was scheduled with GI to be seen before he got Covid. We will consult GI to eval for PEG tube. I discussed with the daughter and she would like to assess him for PEG tube so he gets cleared from Covid and then we can consider EGD. Dementia 
sundowning 
-We will resume Remeron because the daughter said that helped him a lot before. Discussed with the daughter today and he seems at baseline. 
- will avoid sedating medications 
- one time dose of lorazepam ordered 2021 Restless leg syndrome SSS s/p PPM 
Schatzki ring 18.5 - 24.9 Normal weight / Body mass index is 21.47 kg/m². Code status: DNR confirmed with daughter Prophylaxis: Hep SQ Recommended Disposition: SNF/LTC Subjective: Chief Complaint / Reason for Physician Visit Mr. Mendel Bade had received olanzapine and he was too sedated to communicate with me todasy. Continuing follow of Mr. Mendel Bade with covid pneumonia, and Lung fibrosis. Discussed with RN events overnight. Patient was seen and examined. No acute events overnight. Review of Systems: 
Symptom Y/N Comments  Symptom Y/N Comments Fever/Chills    Chest Pain Poor Appetite    Edema Cough    Abdominal Pain Sputum    Joint Pain SOB/CORRIGAN    Pruritis/Rash Nausea/vomit    Tolerating PT/OT Diarrhea    Tolerating Diet Constipation    Other Could NOT obtain due to: confused Objective: VITALS:  
Last 24hrs VS reviewed since prior progress note. Most recent are: 
Patient Vitals for the past 24 hrs: 
 Temp Pulse Resp BP SpO2  
01/06/21 0758 99 °F (37.2 °C) 60 20  99 % 01/06/21 0314 97.5 °F (36.4 °C) 60 22 113/74 97 % 01/05/21 2332 97.8 °F (36.6 °C) 61 17 122/64 96 % 01/05/21 1947 97.7 °F (36.5 °C) 64 19 113/72 97 % 01/05/21 1538 98.5 °F (36.9 °C) 60 28 125/62 98 % 01/05/21 1058 97.8 °F (36.6 °C) 68 28 116/61 97 % Intake/Output Summary (Last 24 hours) at 1/6/2021 8533 Last data filed at 1/6/2021 4774 Gross per 24 hour Intake 1870 ml Output 800 ml Net 1070 ml I had a face to face encounter and independently examined this patient on 1/6/2021, as outlined below: PHYSICAL EXAM: 
General: chonicaly ill appearing, cachectic, drowsy EENT:  EOMI. Anicteric sclerae. Mucous membranes dry Resp:  Bilateral subscapular fine crackles, no wheezing or rales. No accessory muscle use CV:  Regular  rhythm,  No edema GI:  Soft, Non distended, Non tender. +Bowel sounds Neurologic:  Alert and disoriented (demented), speech is muffled as his mucous membranes are very dry, Psych:   Poor insight. Not anxious nor agitated Skin:  No rashes. No jaundice Reviewed most current lab test results and cultures  YES Reviewed most current radiology test results   YES Review and summation of old records today    NO Reviewed patient's current orders and MAR    YES 
PMH/SH reviewed - no change compared to H&P 
 ________________________________________________________________________ Care Plan discussed with: 
  Comments Patient x Family  x  patient's daughter over the phone RN x Care Manager Consultant Multidiciplinary team rounds were held today with , nursing, pharmacist and clinical coordinator. Patient's plan of care was discussed; medications were reviewed and discharge planning was addressed. ________________________________________________________________________ Total NON critical care TIME: 35  Minutes Total CRITICAL CARE TIME Spent:   Minutes non procedure based Comments >50% of visit spent in counseling and coordination of care    
________________________________________________________________________ Fam Boudreaux MD  
 
Procedures: see electronic medical records for all procedures/Xrays and details which were not copied into this note but were reviewed prior to creation of Plan. LABS: 
I reviewed today's most current labs and imaging studies. Pertinent labs include: 
Recent Labs 01/05/21 
4714 01/04/21 
8808 WBC 8.3 7.5 HGB 16.3 15.9 HCT 51.7* 50.3  188 Recent Labs 01/06/21 
5893 01/05/21 
9568 01/04/21 
1856 NA  --  150* 149* K  --  5.0 5.1 CL  --  118* 120* CO2  --  28 25 GLU  --  88 122* BUN  --  60* 65* CREA  --  1.71* 1.60* CA  --  9.1 8.7 MG  --  2.6* 2.6* PHOS  --  3.1 3.7 ALB  --  2.8* 2.8* TBILI  --  0.6 0.7 ALT  --  22 22 INR 1.6* 1.1 1.1 Signed: Fam Boudreaux MD

## 2021-01-06 NOTE — PROGRESS NOTES
Spiritual Care Assessment/Progress Note [address] 
 
 
NAME: Lupe Brown      MRN: [id number] AGE: [de-identified] y.o. SEX: male Orthodoxy Affiliation: Alevism  
Language: [place] 1/6/2021     Total Time (in minutes): 5 Spiritual Assessment begun in [facility] 2 PROGRESSIVE CARE through conversation with: 
  
    []Patient        [] Family    [] Friend(s) Reason for Consult: Initial/Spiritual assessment, patient floor Spiritual beliefs: (Please include comment if needed) 
   [] Identifies with a alcon tradition:     
   [] Supported by a alcon community:        
   [] Claims no spiritual orientation:       
   [] Seeking spiritual identity:            
   [] Adheres to an individual form of spirituality:       
   [x] Not able to assess:                   
 
    
Identified resources for coping:  
   [] Prayer                           
   [] Music                  [] Guided Imagery 
   [] Family/friends                 [] Pet visits [] Devotional reading                         [x] Unknown 
   [] Other:                                          
 
 
Interventions offered during this visit: (See comments for more details) Patient Interventions: Other (comment)(Pt not available) Plan of Care: 
 
 [] Support spiritual and/or cultural needs  
 [] Support AMD and/or advance care planning process    
 [] Support grieving process 
 [] Coordinate Rites and/or Rituals  
 [] Coordination with community clergy [] No spiritual needs identified at this time 
 [] Detailed Plan of Care below (See Comments)  [] Make referral to Music Therapy 
[] Make referral to Pet Therapy    
[] Make referral to Addiction services 
[] Make referral to [facility] 
[] Make referral to Spiritual Care Partner 
[] No future visits requested       
[x] Follow up upon further referrals Comments: Attempted to provide initial spiritual assessment for pt [place] on PROG CARE. Due to covid-19 protocols visit was provided through phone but pt did not answer. Pt appeared asleep through room window. As such, unable to assess emotional, spiritual, psycho-social needs at this time. Chart review mentioned that pt is deaf needing hearing aids. Consult with RN. Advised of  services. [address] [facility] Spiritual Care Provider  Paging Service [phone])

## 2021-01-06 NOTE — PROGRESS NOTES
Comprehensive Nutrition Assessment Type and Reason for Visit: Initial, RD nutrition re-screen/LOS Nutrition Recommendations/Plan: Once enteral access obtained, recommend Jevity 1.5 @ 30 mL/hr and advance as tolerated by 10 mL q 8 hours to goal rate of 60 mL/hr + 150 mL water flushes q 4 hours (provides 2160 kcal, 92g protein, 1994 mL water) Nutrition Assessment:    
Patient medically noted for COVID-19 pneumonia, acute on chronic respiratory failure, and pulmonary edema. PMH COPD, GERD, CKD, CAD, COPD, pulmonary fibrosis, and dementia. Patient remains NPO after SLP evaluation. GI consulted for PEG tube. Would consider NGT placement until PEG able to placed; unlikely to get PEG while COVID positive. TF recommendations provided above are adequate to meet 100% of estimated kcal/protein needs. Noted K+ at 5.0 yesterday; no labs today. Will monitor progress, labs, and plan of care. Estimated Daily Nutrient Needs: 
Energy (kcal): 1908 kcal (BMR 1468 x 1. 3AF); Weight Used for Energy Requirements: Current Protein (g): 86g (1.2 g/kg bw); Weight Used for Protein Requirements: Current Fluid (ml/day): 1900 mL; Method Used for Fluid Requirements: 1 ml/kcal 
 
Nutrition Related Findings:      
Decadron, Protonix, Pravastatin, Pericolace, D5% IVF 
+BS Na 150, K+ 5.0, BUN 60, Cr 1.71, BG -182-122-120 Wounds:   
None Current Nutrition Therapies: DIET NPO Anthropometric Measures: 
· Height:  6' (182.9 cm) · Current Body Wt:  71.8 kg (158 lb 4.6 oz) · BMI Category:  Normal weight (BMI 18.5-24. 9) Nutrition Diagnosis:  
· Inadequate protein-energy intake related to (dysphagia, dementia) as evidenced by NPO or clear liquid status due to medical condition Nutrition Interventions:  
Food and/or Nutrient Delivery: Start tube feeding Nutrition Education and Counseling: No recommendations at this time Coordination of Nutrition Care: No recommendation at this time Goals: TF started and advanced to goal next 2-4 days Nutrition Monitoring and Evaluation:  
Behavioral-Environmental Outcomes: None identified Food/Nutrient Intake Outcomes: Enteral nutrition intake/tolerance Physical Signs/Symptoms Outcomes: Biochemical data, Chewing or swallowing, GI status, Fluid status or edema, Hemodynamic status, Weight Discharge Planning: Too soon to determine Electronically signed by Jennifer Ball RD on 1/6/2021 at 12:05 PM 
 
Contact: ext [phone]

## 2021-01-06 NOTE — INTERDISCIPLINARY ROUNDS
Interdisciplinary team rounds were held 1/5/2021 with the following team members:Care Management, Nursing, Nutrition, Pharmacy, Physician and Clinical Coordinator. Plan of care discussed. See clinical pathway and/or care plan for interventions and desired outcomes. Patient on baseline O2 of 3L via NC. Patient's continues to need restraints. Speech consult today. Transfer to telemetry.

## 2021-01-06 NOTE — PROGRESS NOTES
End of Shift Note Bedside shift change report given to Jane Martin (oncoming nurse) by Jennifer Finley (offgoing nurse). Report included the following information SBAR, Kardex, ED Summary, MAR, Recent Results and Cardiac Rhythm NSR Shift worked:  night Shift summary and any significant changes:  
  no agitation overnight, more alert, able to verbalize. Pending transfer Concerns for physician to address:  Family worried about patient's lack of nutrition, wondering what plan is for nutrition if he continues to fail swallow eval.  
  
Zone phone for oncoming shift:   
  
 
Activity: 
Activity Level: Bed Rest 
Number times ambulated in hallways past shift: 0 Number of times OOB to chair past shift: 0 Cardiac:  
Cardiac Monitoring: Yes     
Cardiac Rhythm: Normal sinus rhythm, Paced Access:  
Current line(s): PIV Genitourinary:  
Urinary status: external catheter Respiratory:  
O2 Device: Nasal cannula Chronic home O2 use?: YES Incentive spirometer at bedside: N/A 
  
GI: 
Last Bowel Movement Date: (PTA) Current diet:  DIET NPO Passing flatus: YES Tolerating current diet: YES 
% Diet Eaten: 0 % Pain Management:  
Patient states pain is manageable on current regimen: YES Skin: 
Oj Score: 12 Interventions: float heels and internal/external urinary devices Patient Safety: 
Fall Score: Total Score: 4 Interventions: gripper socks and stay with me (per policy) High Fall Risk: Yes Length of Stay: 
Expected LOS: 5d 12h Actual LOS: 6 Jennifer Finley

## 2021-01-06 NOTE — PROGRESS NOTES
0700H- Verbal shift change report given to ROSHAN (oncoming nurse) by Jes Grace (offgoing nurse). Report included the following information SBAR, Kardex, ED Summary, Procedure Summary, Intake/Output, MAR and Recent Results. 1000H- Called the GI on call for GI evaluation. Awaiting to call me back. 1300H- I called again for GI on call. A waiting to call me back. End of Shift Note Bedside shift change report given to LUCILA (oncoming nurse) by Edvin Miner (offgoing nurse). Report included the following information SBAR, Kardex, ED Summary, Procedure Summary, Intake/Output, MAR and Recent Results Shift worked:  0700- 1900 Shift summary and any significant changes: NONE AT THIS SWATI Concerns for physician to address:  [facility] [facility]    
  
 
Activity: 
Activity Level: Bed Rest 
Number times ambulated in hallways past shift: 0 Number of times OOB to chair past shift: 0 Cardiac:  
Cardiac Monitoring: Yes     
Cardiac Rhythm: Sinus bradycardia, Paced Access:  
Current line(s): PIV Genitourinary:  
Urinary status: external catheter Respiratory:  
O2 Device: Nasal cannula Chronic home O2 use?: YES Incentive spirometer at bedside: N/A 
  
GI: 
Last Bowel Movement Date: (PTA) Current diet:  DIET NPO Passing flatus: NO Tolerating current diet: NO 
% Diet Eaten: 0 % Pain Management:  
Patient states pain is manageable on current regimen: N/A Skin: 
Oj Score: 10 Interventions: PT/OT consult Patient Safety: 
Fall Score: Total Score: 4 Interventions: pt to call before getting OOB High Fall Risk: Yes Length of Stay: 
Expected LOS: 5d 12h Actual LOS: 6 [facility]

## 2021-01-06 NOTE — INTERDISCIPLINARY ROUNDS
Interdisciplinary team rounds were held 1/6/2021 with the following team members:Care Management, Nursing, Nutrition, Pharmacy, Physician and Clinical Coordinator. Plan of care discussed. See clinical pathway and/or care plan for interventions and desired outcomes. Family requesting PEG tube placement for nutrition. GI consult placed. Patient on BL 3L O2. Transfer to telemetry.

## 2021-01-06 NOTE — CONSULTS
Gastroenterology Consult Referring Physician: Dr. Mary Galvez Consult Date: 1/6/2021 Subjective: Chief Complaint: feeding difficulties History of Present Illness: Monae Schultz is a [de-identified] y.o. male who is seen in consultation for consideration of PEG tube placement. The patient is unable to contribute to history due to AMS. The history is obtained from reviewing the medical record, discussion with speech therapist and phone conversation with the patient's daughter, Shirlene Oconnell who I reached at [phone]. She tells me her father is a .  He had mild dementia but prior to his hospitalization he was oriented x3. He enjoyed reading the newspaper and watching tv. He had aides that checked in on him during the day and his children would spend the night with him. He was living semi independently. He was in the process of moving into an assisted living facility when he got hospitalized. She tells me he is deaf and wears hearing aids and reads lips but usually has very intelligible speech and can definitely tell you who the president is, etc.  She had noticed over the past couple of months that he was coughing and choking more with liquids, particularly cold liquids. She had brought it to his PCP's attention. They were adding thickener to his liquids. He had lost about 20 lbs over several months so they were supplementing with ensure. He was eating a soft diet but tolerating it well. He was admitted to the hospital 12/30/20 with Covid pneumonia and acute on chronic respiratory failure. He has underlying pulmonary fibrosis and COPD and usually wears 3L oxygen but was brought in by EMS in respiratory distress with O2 sats in 70's. He has improved from a COVID standpoint but continues to have severe AMS. He had been on zyprexa for agitation but was very sedated so that was stopped. Today he is awake but does not follow commands, have intelligible speech or answer any questions including his name correctly. He has been NPO due to his AMS and concerns for aspiration. We are asked to consider PEG tube. Of note, CT scan shows a medium to large hiatal hernia. Past Medical History:  
Diagnosis Date  Arthritis   
 parris knee  Asthma   
 dr Lorena Ramires 5/18/17  Basal cell carcinoma of skin 05/10/2017  
 right helix Jose David Baez's note rec'd  CAD (coronary artery disease)   
 dr Vivien Sheehan  CKD (chronic kidney disease), stage III 05/2014 Monica Heading notes 8/8/17 note and lab  Colon polyps 09/25/13  
 also dad with colon cancer  Contact dermatitis and other eczema, due to unspecified cause   
 eczema dr Ramiro Goodpasture  COPD   
 dr Bartolome Baxter chronic bronchitis     5/18/17  Elevated serum creatinine  Fall 11 & 12/2012 Right knee gave away and tripped in his house  GERD (gastroesophageal reflux disease)   
 dr Jaret Cuevas (hard of hearing)  Hypercholesterolemia  ILD (interstitial lung disease) ([facility] [address] 07/15/2014 Dr Júnior Manley's   pulmonary fibrosis 5/18/17 f/u note  Proteinuria 10/2012  
 abou assi     12/2/16  RLS (restless legs syndrome)  Schatzki's ring 5/03  
 dilation dr Ana Mobley  Screening for glaucoma 09/21/2016 Shyanne Chandler note rec'd (annual eye exam)  Shingles 4/2012  Sleep apnea   
 uses CPAP- 7/6/17 f/u note [facility] note/sleep study 9/8/17 Past Surgical History:  
Procedure Laterality Date  ENDOSCOPY, COLON, DIAGNOSTIC    
  dr Amy Urrutia repeat in 5 yrs  HX COLONOSCOPY  [id number]  
 brady- ileocecal valve polyp 5 yr repeat [facility] HE[facility]  Summer 2017 Right ear basal cell carcinoma  HX HERNIA REPAIR  2018 Open left inguinal hernia repair with mesh.  HX PACEMAKER  2017 Dr. Florina Jane    
 left inq hernia  TN CARDIAC SURG PROCEDURE UNLIST  2017 Pacemaker  TN EXTRAC ERUPTED TOOTH/EXPOSED ROOT  2017  
 3 teeth extraction. Family History Problem Relation Age of Onset  Cancer Father   
      from colon cancer Social History Tobacco Use  Smoking status: Former Smoker  Smokeless tobacco: Former User Quit date: 1985 Substance Use Topics  Alcohol use: No  
  
No Known Allergies Current Facility-Administered Medications Medication Dose Route Frequency  mirtazapine (REMERON) tablet 15 mg  15 mg Oral QHS  dextrose 5% infusion  100 mL/hr IntraVENous CONTINUOUS  
 enoxaparin (LOVENOX) injection 30 mg  30 mg SubCUTAneous Q12H  aspirin chewable tablet 81 mg  81 mg Oral DAILY  pravastatin (PRAVACHOL) tablet 40 mg  40 mg Oral QHS  sodium chloride (NS) flush 5-40 mL  5-40 mL IntraVENous Q8H  
 sodium chloride (NS) flush 5-40 mL  5-40 mL IntraVENous PRN  
 acetaminophen (TYLENOL) tablet 650 mg  650 mg Oral Q6H PRN Or  
 acetaminophen (TYLENOL) suppository 650 mg  650 mg Rectal Q6H PRN  polyethylene glycol (MIRALAX) packet 17 g  17 g Oral DAILY PRN  promethazine (PHENERGAN) tablet 12.5 mg  12.5 mg Oral Q6H PRN Or  
 ondansetron (ZOFRAN) injection 4 mg  4 mg IntraVENous Q6H PRN  
 dexamethasone (DECADRON) 4 mg/mL injection 6 mg  6 mg IntraVENous Q24H  
 senna-docusate (PERICOLACE) 8.6-50 mg per tablet 1 Tab  1 Tab Oral BID  pantoprazole (PROTONIX) 40 mg in 0.9% sodium chloride 10 mL injection  40 mg IntraVENous DAILY  albuterol-ipratropium (DUO-NEB) 2.5 MG-0.5 MG/3 ML  3 mL Nebulization Q4H PRN Review of Systems: 
Unable to obtain due to AMS. Objective:  
 
Physical Exam: 
Visit Vitals /78 (BP 1 Location: Left arm, BP Patient Position: At rest) Pulse 60 Temp 99 °F (37.2 °C) Resp 17 Ht 6' (1.829 m) Wt 71.8 kg (158 lb 4.6 oz) SpO2 100% BMI 21.47 kg/m² Gen: elderly white male in NAD Skin:  Extremities and face reveal no rashes. HEENT: No scleral icterus. Cardiovascular: Regular rate and rhythm. No murmurs, gallops, or rubs. Respiratory:  Comfortable breathing with no accessory muscle use. 2L Nasal cannula in place. Clear breath sounds with no wheezes, rales, or rhonchi. GI:  Abdomen nondistended, soft, and nontender. Normal active bowel sounds. Large R inguinal hernia Rectal:  Deferred Musculoskeletal:  Soft restraints on hands bilaterally. No pitting edema of lower extremities. Neurological:  He does not make eye contact. He does not follow commands or answer questions appropriately. He cannot tell me his name or where he is. He has very garbled, unintelligible speech. Psychiatric:  Pleasant, not agitated Lab/Data Review: 
Lab Results Component Value Date/Time WBC 8.3 01/05/2021 03:32 AM  
 HGB 16.3 01/05/2021 03:32 AM  
 HCT 51.7 (H) 01/05/2021 03:32 AM  
 PLATELET 454 17/96/7754 03:32 AM  
 .0 (H) 01/05/2021 03:32 AM  
 
Lab Results Component Value Date/Time  Sodium 150 (H) 01/05/2021 03:32 AM  
 Potassium 5.0 01/05/2021 03:32 AM  
 Chloride 118 (H) 01/05/2021 03:32 AM  
 CO2 28 01/05/2021 03:32 AM  
 Anion gap 4 (L) 01/05/2021 03:32 AM  
 Glucose 88 01/05/2021 03:32 AM  
 BUN 60 (H) 01/05/2021 03:32 AM  
 Creatinine 1.71 (H) 01/05/2021 03:32 AM  
 BUN/Creatinine ratio 35 (H) 01/05/2021 03:32 AM  
 GFR est AA 47 (L) 01/05/2021 03:32 AM  
 GFR est non-AA 39 (L) 01/05/2021 03:32 AM  
 Calcium 9.1 01/05/2021 03:32 AM  
 Bilirubin, total 0.6 01/05/2021 03:32 AM  
 Alk. phosphatase 88 01/05/2021 03:32 AM  
 Protein, total 7.0 01/05/2021 03:32 AM  
 Albumin 2.8 (L) 01/05/2021 03:32 AM  
 Globulin 4.2 (H) 01/05/2021 03:32 AM  
 A-G Ratio 0.7 (L) 01/05/2021 03:32 AM  
 ALT (SGPT) 22 01/05/2021 03:32 AM  
 AST (SGOT) 34 01/05/2021 03:32 AM  
 
CT Results (most recent): 
Results from Hospital Encounter encounter on 12/30/20 CT ABD PELV W CONT Narrative EXAM:  CT abdomen pelvis with contrast 
 
INDICATION: Abdominal pain. COMPARISON: CT 7/10/2020. TECHNIQUE: Helical CT of the abdomen  and pelvis  following the uneventful 
intravenous administration of nonionic contrast.  Coronal and sagittal reformats 
are performed. CT dose reduction was achieved through use of a standardized 
protocol tailored for this examination and automatic exposure control for dose 
modulation. FINDINGS:  
The visualized lung bases demonstrate stable chronic interstitial changes. There 
is stable cardiomegaly. There is no pericardial or pleural effusion. There is a 
stable moderate hiatal hernia. The liver, spleen, pancreas, and adrenal glands are normal. The gall bladder is 
present  without intra- or extra-hepatic biliary dilatation. The kidneys enhance symmetrically without hydronephrosis and are stable in 
appearance. There are no dilated bowel loops. The appendix is normal. There is diffuse 
colonic diverticulosis without focal adjacent inflammation. A right inguinal 
hernia is again noted containing distal colon. There are no enlarged lymph nodes. There is no free fluid or free air. The 
aorta tapers without aneurysm. The urinary bladder is normal.  There is no pelvic mass. The prostate is not 
enlarged. There are degenerative changes in the spine without aggressive bony lesion. Impression IMPRESSION:  
No acute abnormality in the abdomen or pelvis. Stable chronic/incidental 
findings as above. Assessment/Plan: Active Problems: 
  Pulmonary edema (12/31/2020) Acute respiratory failure due to COVID-19 [facility]) (12/31/2020) [de-identified] y/o male with underlying COPD and pulmonary fibrosis and mild dementia admitted to the hospital with COVID19 and respiratory failure. His respiratory failure is improving but his AMS has precluded him from safely taking in PO due to risk of aspiration. We are asked to consider a PEG tube. Patient's AMS is a significant change from his baseline prior to this hospitalization. I had a 30min discussion with his daughter, Fara who is a pharmacist and very pleasant and knowledgeable. She informed me that her brother is an MD and they look in on their father frequently. I do not feel that a PEG tube is appropriate at this time given that he is still recovering from his acute illness and his long term prognosis is not yet certain. I recommend an alternative means of providing nutrition in the short term. Given his medium to large sized hiatal hernia, I recommend a dobhoff tube as the first choice or if that is not feasible, then an NGT. If he pulls out the tube or does not tolerate it for any reason, then I would consider TPN. If the patient makes a full recovery and a PEG tube is desired for long term nutrition needs, we would be happy to reassess him. We will see on request while he is here and follow up as an outpatient.    
 
DAILY Ardon 
01/06/21 
2:12 PM

## 2021-01-07 NOTE — PROGRESS NOTES
End of Shift Note Bedside shift change report given to Cielo Huynh (oncoming nurse) by Kirsten Neri (offgoing nurse). Report included the following information SBAR, Kardex, ED Summary, MAR, Recent Results and Cardiac Rhythm NSR, atilio Shift worked:  night Shift summary and any significant changes:  
  Patient more drowsy last night than previous nights- still NPO and unable to take oral meds. Patient's upper airway sounds more coarse, suctioned x2 to get excess secretions out. Continued with mouth care. Pending transfer off unit. Potential procedure for Dobhoff or PEG placement 1/7. Concerns for physician to address:  NA 
  
Zone phone for oncoming shift:    
  
 
Activity: 
Activity Level: Bed Rest 
Number times ambulated in hallways past shift: 0 Number of times OOB to chair past shift: 0 Cardiac:  
Cardiac Monitoring: Yes     
Cardiac Rhythm: Normal sinus rhythm Access:  
Current line(s): PIV Genitourinary:  
Urinary status: external catheter Respiratory:  
O2 Device: Nasal cannula Chronic home O2 use?: YES Incentive spirometer at bedside: N/A 
  
GI: 
Last Bowel Movement Date: (PTA) Current diet:  DIET NPO Passing flatus: YES Tolerating current diet: YES 
% Diet Eaten: 0 % Pain Management:  
Patient states pain is manageable on current regimen: YES Skin: 
Oj Score: 10 Interventions: foam dressing and internal/external urinary devices Patient Safety: 
Fall Score: Total Score: 4 Interventions: gripper socks and stay with me (per policy) High Fall Risk: Yes Length of Stay: 
Expected LOS: 5d 12h Actual LOS: 7 Kirsten Neri

## 2021-01-07 NOTE — PROGRESS NOTES
Speech Therapy Chart reviewed, spoke with RN. Per RN, patient is not medically appropriate for PO at this time. Will not follow commands, not responding appropriately. Will follow up as medically appropriate. Nina Barahona M.S., CCC-SLP

## 2021-01-07 NOTE — PROGRESS NOTES
Comprehensive Nutrition Assessment Type and Reason for Visit: Usman Barlow Nutrition Recommendations/Plan:  
· Jevity 1.5 @ 20 mL/hr and advance as tolerated by 10 mL q 8 hours to goal rate of 60 mL/hr + 150 mL water flushes q 4 hours (provides 2160 kcal, 92g protein, 1994 mL water) · Consider discontinuing/adjusting IVF once goal rate reached Nutrition Assessment:    
Chart reviewed; new consult received for TF recommendations and management. Plans to place NGT vs DHT today. Patient remains inappropriate for PO intake. SLP and GI following. TF recommendations above adequate to meet 100% of estimated kcal/protein needs. Monitor lytes and replete as needed. Estimated Daily Nutrient Needs: 
Energy (kcal): 1908 kcal (BMR 1468 x 1. 3AF); Weight Used for Energy Requirements: Admission Protein (g): 86g (1.2 g/kg bw); Weight Used for Protein Requirements: Current Fluid (ml/day): 1900 mL; Method Used for Fluid Requirements: 1 ml/kcal 
 
Nutrition Related Findings:      
K+ 4.4 No documented BM Decadron, Remeron, Protonix, Pravastatin, Pericolace, D5% IVF Wounds:   
None Current Nutrition Therapies: DIET NPO Anthropometric Measures: 
· Height:  6' (182.9 cm) · Current Body Wt:  71.8 kg (158 lb 4.6 oz) · BMI Category:  Normal weight (BMI 18.5-24. 9) Nutrition Diagnosis:  
· Inadequate protein-energy intake related to (dysphagia, dementia) as evidenced by NPO or clear liquid status due to medical condition Nutrition Interventions:  
Food and/or Nutrient Delivery: Start tube feeding Nutrition Education and Counseling: No recommendations at this time Coordination of Nutrition Care: Continue to monitor while inpatient, Interdisciplinary rounds, Speech therapy Goals: 
TF started and advanced to goal next 2-4 days Nutrition Monitoring and Evaluation:  
Behavioral-Environmental Outcomes: None identified Food/Nutrient Intake Outcomes: Enteral nutrition intake/tolerance Physical Signs/Symptoms Outcomes: Biochemical data, Chewing or swallowing, GI status, Hemodynamic status, Weight Discharge Planning: Too soon to determine Electronically signed by Jared Duffy RD on 1/7/2021 at 2:50 PM 
 
Contact: ext [phone]

## 2021-01-07 NOTE — PROGRESS NOTES
. 
 
 
Hospitalist Progress Note NAME: Sherrie Rutledge :  1940 MRN:  [id number] Assessment / Plan: 
 
 
 
Acute on chronic hypoxic respiratory failure usually on 3 liters HOT 
COVID pneumonia Pulmonary fibrosis 
- cont dexamethasone (to complete 10 days with last day ), vitamin C and Zinc 
- s/p remdesivir completed  
- s/p azithromycin,ceftriaxone  
- on 2 L NC, wean off O2 to keep > 92%, stable on that ARAVIND/CKD stage III  Improved initally now with poor oral intake Creatinine in July 1.3+ and earlier this month 1.92 Dehydration Poor oral intake Hypernatremia, resolved Changed IV fluids to D5 
- avoid nephrotoxic drugs 
- will continue to monitor 
- kept NPO as he has aspiration risk with his drowsiness 
-TF started  - Speech eval--> npo - please maintain good oral hygiene 
-Discussed with the patient daughter over the phone and she reported that the patient had issues with swallowing started in 2020 for which she was scheduled with GI to be seen before he got Covid. GI evaluated patient and recommenced alternative way for elective PEG, input is appreciated. Dementia 
sundowning 
-We will resume Remeron because the daughter said that helped him a lot before. Discussed with the daughter today and he seems at baseline. 
- will avoid sedating medications 
- one time dose of lorazepam ordered 2021 Restless leg syndrome SSS s/p PPM 
Schatzki ring 18.5 - 24.9 Normal weight / Body mass index is 21.47 kg/m². Code status: DNR confirmed with daughter Prophylaxis: Hep SQ Recommended Disposition: SNF/LTC Subjective: Chief Complaint / Reason for Physician Visit Mr. Zamzam Hernández had received olanzapine and he was too sedated to communicate with me todasy. Continuing follow of Mr. Zamzam Hernández with covid pneumonia, and Lung fibrosis. Discussed with RN events overnight. Patient was seen and examined. No acute events overnight. Review of Systems: 
Symptom Y/N Comments  Symptom Y/N Comments Fever/Chills    Chest Pain Poor Appetite    Edema Cough    Abdominal Pain Sputum    Joint Pain SOB/CORRIGAN    Pruritis/Rash Nausea/vomit    Tolerating PT/OT Diarrhea    Tolerating Diet Constipation    Other Could NOT obtain due to: confused Objective: VITALS:  
Last 24hrs VS reviewed since prior progress note. Most recent are: 
Patient Vitals for the past 24 hrs: 
 Temp Pulse Resp BP SpO2  
01/07/21 0808 (!) 96.7 °F (35.9 °C) 60 24 116/67 94 % 01/07/21 0335 98.2 °F (36.8 °C) 60 19 114/63 95 % 01/06/21 2330 97.2 °F (36.2 °C) 60 17 118/70 97 % 01/1940 97.1 °F (36.2 °C) (!) 58 21 121/75 98 % 01/06/21 1429 98.7 °F (37.1 °C) 61 21 119/68 96 % 01/06/21 1126 99 °F (37.2 °C) 60 17  100 % Intake/Output Summary (Last 24 hours) at 1/7/2021 9093 Last data filed at 1/7/2021 6329 Gross per 24 hour Intake 2434.99 ml Output 1250 ml Net 1184.99 ml I had a face to face encounter and independently examined this patient on 1/7/2021, as outlined below: PHYSICAL EXAM: 
General: chonicaly ill appearing, cachectic, drowsy EENT:  EOMI. Anicteric sclerae. Mucous membranes dry Resp:  Bilateral subscapular fine crackles, no wheezing or rales. No accessory muscle use CV:  Regular  rhythm,  No edema GI:  Soft, Non distended, Non tender. +Bowel sounds Neurologic:  Alert and disoriented (demented), speech is muffled as his mucous membranes are very dry, Psych:   Poor insight. Not anxious nor agitated Skin:  No rashes. No jaundice Reviewed most current lab test results and cultures  YES Reviewed most current radiology test results   YES Review and summation of old records today    NO Reviewed patient's current orders and MAR    YES 
PMH/SH reviewed - no change compared to H&P 
________________________________________________________________________ Care Plan discussed with: 
  Comments Patient x Family  x  patient's daughter over the phone 01/07 RN x Care Manager Consultant  x GI Multidiciplinary team rounds were held today with , nursing, pharmacist and clinical coordinator. Patient's plan of care was discussed; medications were reviewed and discharge planning was addressed. ________________________________________________________________________ Total NON critical care TIME: 35  Minutes Total CRITICAL CARE TIME Spent:   Minutes non procedure based Comments >50% of visit spent in counseling and coordination of care    
________________________________________________________________________ Parul Mi MD  
 
Procedures: see electronic medical records for all procedures/Xrays and details which were not copied into this note but were reviewed prior to creation of Plan. LABS: 
I reviewed today's most current labs and imaging studies. Pertinent labs include: 
Recent Labs 01/07/21 
9173 01/05/21 
9701 WBC 9.0 8.3 HGB 15.8 16.3 HCT 48.8 51.7*  
 190 Recent Labs 01/07/21 
5262 01/06/21 
6744 01/05/21 
5470   --  150*  
K 4.4  --  5.0  
  --  118* CO2 24  --  28  
*  --  88  
BUN 51*  --  60* CREA 1.20  --  1.71* CA 8.4*  --  9.1 MG  --   --  2.6* PHOS  --   --  3.1 ALB  --   --  2.8* TBILI  --   --  0.6 ALT  --   --  22 INR 1.2* 1.6* 1.1 Signed: Parul Mi MD

## 2021-01-07 NOTE — PROGRESS NOTES
01/07/21 1513 Vitals Temp 97.3 °F (36.3 °C) Temp Source Axillary Pulse (Heart Rate) 71 Heart Rate Source Monitor Resp Rate 29  
O2 Sat (%) 95 % Level of Consciousness Responds to Voice BP 94/65 MAP (Calculated) 75 BP 1 Location Left arm BP 1 Method Automatic  
BP Patient Position At rest  
Cardiac Rhythm NSR  
MEWS Score 4 Pain 1 Pain Scale 1 Visual  
Pain Intensity 1 0 Adult Nonverbal Pain Scale Face 0 Activity (Movement) 0 Guarding 0 Physiology (Vital Signs) 0 Respiratory 0 Total Score 0 Oxygen Therapy O2 Device Nasal cannula O2 Flow Rate (L/min) 2 l/min MEWS elevated due to tachypnea. MD beach will continue to monitor

## 2021-01-07 NOTE — PROGRESS NOTES
0700- Report given to Mariaa Whalen, RN and SudhaRN by off going nurse. 0750- Bilateral Mitts removed. 1315- Zoom call with Pt and family. 1430- Confirmed with Dr. Luis Uribe. We will attempt NGT placement. If we are not able to place NGT we will place order for Dobhoff. Dietician notified of need for TF recommendations. 1525- Attempted to place NGT x 2 with no success. Order for Dobbhoff placement entered. 32 61 16- Call to IR but no answer. 1625- Spoke with IR. Dobbhoff will be placed tomorrow. Dr. Luis Uribe notified. 1900- Report given to oncoming nurse by Mariaa Whalen RN and Jill Hernandez RN. End of Shift Note Shift worked:  6033-7229 Shift summary and any significant changes:  
  Pt rested throughout shift. 2 L NC. Dobhoff ordered. To be placed in AM. Concerns for physician to address:  none Zone phone for oncoming shift:   ? Activity: 
Activity Level: Bed Rest 
Number times ambulated in hallways past shift: 0 Number of times OOB to chair past shift: 0 Cardiac:  
Cardiac Monitoring: Yes     
Cardiac Rhythm: Normal sinus rhythm Access:  
Current line(s): PIV Genitourinary:  
Urinary status: external catheter Respiratory:  
O2 Device: Nasal cannula Chronic home O2 use?: YES Incentive spirometer at bedside: N/A 
  
GI: 
Last Bowel Movement Date: (PTA) Current diet:  DIET NPO 
DIET TUBE FEEDING Passing flatus: YES Tolerating current diet: NO 
% Diet Eaten: 0 % Pain Management:  
Patient states pain is manageable on current regimen: N/A Skin: 
Oj Score: 11 Interventions: turn team, float heels, foam dressing, PT/OT consult, limit briefs and internal/external urinary devices Patient Safety: 
Fall Score: Total Score: 4 Interventions: bed/chair alarm, assistive device (walker, cane, etc), gripper socks, pt to call before getting OOB and stay with me (per policy) High Fall Risk: Yes Length of Stay: 
Expected LOS: 5d 12h Actual LOS: 7 
 
 
 Concha Sigala RN

## 2021-01-07 NOTE — PROGRESS NOTES
01/07/21 1120 Vitals Temp 98 °F (36.7 °C) Temp Source Axillary Pulse (Heart Rate) 60 Heart Rate Source Monitor Resp Rate (!) 32  
O2 Sat (%) 96 % Level of Consciousness Alert /79 MAP (Calculated) 93 BP 1 Location Left arm BP 1 Method Automatic Cardiac Rhythm NSR  
MEWS Score 3 Adult Nonverbal Pain Scale Face 0 Activity (Movement) 0 Guarding 0 Physiology (Vital Signs) 0 Respiratory 0 Total Score 0 Mews elevated due to tachypnea. MD beach will monitor

## 2021-01-08 NOTE — PROGRESS NOTES
Problem: Falls - Risk of 
Goal: *Absence of Falls Description: Document Clydene Bone Fall Risk and appropriate interventions in the flowsheet. Outcome: Progressing Towards Goal 
Variance Patient Condition Impact: High 
Note: Fall Risk Interventions: 
Mobility Interventions: Bed/chair exit alarm, Communicate number of staff needed for ambulation/transfer, Patient to call before getting OOB Mentation Interventions: Adequate sleep, hydration, pain control, Bed/chair exit alarm, Door open when patient unattended, Eyeglasses and hearing aids Medication Interventions: Assess postural VS orthostatic hypotension, Bed/chair exit alarm, Evaluate medications/consider consulting pharmacy, Teach patient to arise slowly Elimination Interventions: Call light in reach, Patient to call for help with toileting needs, Stay With Me (per policy), Urinal in reach History of Falls Interventions: Bed/chair exit alarm, Investigate reason for fall Problem: Patient Education: Go to Patient Education Activity Goal: Patient/Family Education Outcome: Progressing Towards Goal 
  
Problem: Pressure Injury - Risk of 
Goal: *Prevention of pressure injury Description: Document Oj Scale and appropriate interventions in the flowsheet. Outcome: Progressing Towards Goal 
Variance Patient Condition Impact: High 
Note: Pressure Injury Interventions: 
Sensory Interventions: Assess changes in LOC, Avoid rigorous massage over bony prominences, Check visual cues for pain, Float heels, Keep linens dry and wrinkle-free, Maintain/enhance activity level, Minimize linen layers, Monitor skin under medical devices Moisture Interventions: Absorbent underpads, Apply protective barrier, creams and emollients, Internal/External urinary devices, Limit adult briefs Activity Interventions: Pressure redistribution bed/mattress(bed type) Mobility Interventions: Assess need for specialty bed, HOB 30 degrees or less, Pressure redistribution bed/mattress (bed type) Nutrition Interventions: Discuss nutritional consult with provider Friction and Shear Interventions: Apply protective barrier, creams and emollients, Lift sheet, Minimize layers Problem: Patient Education: Go to Patient Education Activity Goal: Patient/Family Education Outcome: Progressing Towards Goal 
  
Problem: Non-Violent Restraints Goal: *Removal from restraints as soon as assessed to be safe Outcome: Progressing Towards Goal 
Goal: *No harm/injury to patient while restraints in use Outcome: Progressing Towards Goal 
Goal: *Patient's dignity will be maintained Outcome: Progressing Towards Goal 
Goal: *Patient Specific Goal (EDIT GOAL, INSERT TEXT) Outcome: Progressing Towards Goal 
Goal: Non-violent Restaints:Standard Interventions Outcome: Progressing Towards Goal 
Goal: Non-violent Restraints:Patient Interventions Outcome: Progressing Towards Goal 
Goal: Patient/Family Education Outcome: Progressing Towards Goal 
  
Problem: Patient Education: Go to Patient Education Activity Goal: Patient/Family Education Outcome: Progressing Towards Goal

## 2021-01-08 NOTE — PROGRESS NOTES
TRANSFER - OUT REPORT: 
 
Verbal report given to Starr(name) on Deepak Escalante  being transferred to Ortho(unit) for routine progression of care Report consisted of patients Situation, Background, Assessment and  
Recommendations(SBAR). Information from the following report(s) SBAR, Kardex, ED Summary, Intake/Output, MAR and Cardiac Rhythm Paced was reviewed with the receiving nurse. Lines:  
Peripheral IV 12/31/20 Left Forearm (Active) Site Assessment Clean, dry, & intact 01/08/21 1105 Phlebitis Assessment 0 01/08/21 1105 Infiltration Assessment 0 01/08/21 1105 Dressing Status Clean, dry, & intact 01/08/21 1105 Dressing Type Transparent 01/08/21 1105 Hub Color/Line Status Pink 01/08/21 1105 Action Taken Open ports on tubing capped 01/08/21 0425 Alcohol Cap Used Yes 01/08/21 0425 Opportunity for questions and clarification was provided. Patient transported with: 
 Monitor O2 @ 2 liters Tech PT TO TRANSFER TO [facility]

## 2021-01-08 NOTE — PROGRESS NOTES
1900-Verbal and Written shift change report given to Jill Hernandez RN (oncoming nurse) by Atiya Gautam RN (offgoing nurse). Report included the following information SBAR, Kardex, ED Summary, Intake/Output, MAR, Recent Results, Med Rec Status, Cardiac Rhythm Paced/NSR and Alarm Parameters . 0700-End of Shift Note Bedside shift change report given to [facility] (oncoming nurse) by Yfn Median (offgoing nurse). Report included the following information SBAR, Kardex, ED Summary, Intake/Output, MAR, Recent Results, Med Rec Status, Cardiac Rhythm Paced and Alarm Parameters Shift worked:  8504-9867 Shift summary and any significant changes:  
  none Concerns for physician to address:  none Zone phone for oncoming shift:   [phone] Activity: 
Activity Level: Bed Rest 
Number times ambulated in hallways past shift: 0 Number of times OOB to chair past shift: 0 Cardiac:  
Cardiac Monitoring: Yes     
Cardiac Rhythm: Paced, Normal sinus rhythm Access:  
Current line(s): PIV Genitourinary:  
Urinary status: incontinent and external catheter Respiratory:  
O2 Device: Nasal cannula Chronic home O2 use?: YES Incentive spirometer at bedside: NO 
  
GI: 
Last Bowel Movement Date: (PTA) Current diet:  DIET NPO 
DIET TUBE FEEDING Passing flatus: YES Tolerating current diet: YES 
% Diet Eaten: 0 % Pain Management:  
Patient states pain is manageable on current regimen: N/A Skin: 
Oj Score: 12 Interventions: limit briefs, internal/external urinary devices and nutritional support Patient Safety: 
Fall Score: Total Score: 3 Interventions: bed/chair alarm, gripper socks and stay with me (per policy) High Fall Risk: Yes Length of Stay: 
Expected LOS: 5d 12h Actual LOS: 7 Yfn Medina

## 2021-01-08 NOTE — PROGRESS NOTES
Spiritual Care Assessment/Progress Note [address] 
 
 
NAME: Anusha Christiansen      MRN: [id number] AGE: [de-identified] y.o. SEX: male Christianity Affiliation: Jain  
Language: Martinez Hickey 1/8/2021     Total Time (in minutes): 5 Spiritual Assessment begun in [facility] PROGRESSIVE CARE through conversation with: 
  
    []Patient        [] Family    [] Friend(s) Reason for Consult: Palliative Care, Initial/Spiritual Assessment Spiritual beliefs: (Please include comment if needed) 
   [] Identifies with a alcon tradition:     
   [] Supported by a alcon community:        
   [] Claims no spiritual orientation:       
   [] Seeking spiritual identity:            
   [] Adheres to an individual form of spirituality:       
   [x] Not able to assess:                   
 
    
Identified resources for coping:  
   [] Prayer                           
   [] Music                  [] Guided Imagery 
   [] Family/friends                 [] Pet visits [] Devotional reading                         [x] Unknown 
   [] Other:                                         
 
 
Interventions offered during this visit: (See comments for more details) Patient Interventions: Other (comment)(Pt not available) Plan of Care: 
 
 [] Support spiritual and/or cultural needs  
 [] Support AMD and/or advance care planning process    
 [] Support grieving process 
 [] Coordinate Rites and/or Rituals  
 [] Coordination with community clergy [] No spiritual needs identified at this time 
 [] Detailed Plan of Care below (See Comments)  [] Make referral to Music Therapy 
[] Make referral to Pet Therapy    
[] Make referral to Addiction services 
[] Make referral to [facility] 
[] Make referral to Spiritual Care Partner 
[] No future visits requested       
[] Follow up upon further referrals Comments: Attempted Palliative Initial Spiritual Assessment for this pt in [address] [place] [address]. Pt was unable to be assessed at this time. No family/friends present at time of visit. Contact Spiritual Care Services for any spiritual or emotional support needs. Idalia Figueroa MDiv. Staff  Request  Support/Spiritual Care Services via [facility] [employer]

## 2021-01-08 NOTE — PROGRESS NOTES
Problem: Falls - Risk of 
Goal: *Absence of Falls Description: Document Jennyfer Ellis Fall Risk and appropriate interventions in the flowsheet. Outcome: Progressing Towards Goal 
Note: Fall Risk Interventions: 
Mobility Interventions: Communicate number of staff needed for ambulation/transfer, Patient to call before getting OOB Mentation Interventions: Adequate sleep, hydration, pain control, More frequent rounding Medication Interventions: Evaluate medications/consider consulting pharmacy Elimination Interventions: Toileting schedule/hourly rounds History of Falls Interventions: Bed/chair exit alarm, Room close to nurse's station Problem: Patient Education: Go to Patient Education Activity Goal: Patient/Family Education Outcome: Progressing Towards Goal 
  
Problem: Pressure Injury - Risk of 
Goal: *Prevention of pressure injury Description: Document Oj Scale and appropriate interventions in the flowsheet. Outcome: Progressing Towards Goal 
Note: Pressure Injury Interventions: 
Sensory Interventions: Assess changes in LOC, Float heels, Keep linens dry and wrinkle-free, Maintain/enhance activity level, Minimize linen layers, Monitor skin under medical devices, Pad between skin to skin Moisture Interventions: Absorbent underpads, Maintain skin hydration (lotion/cream), Minimize layers, Moisture barrier Activity Interventions: Pressure redistribution bed/mattress(bed type), PT/OT evaluation Mobility Interventions: HOB 30 degrees or less Nutrition Interventions: Document food/fluid/supplement intake, Offer support with meals,snacks and hydration, Discuss nutritional consult with provider Friction and Shear Interventions: Minimize layers Problem: Patient Education: Go to Patient Education Activity Goal: Patient/Family Education Outcome: Progressing Towards Goal 
  
Problem: Non-Violent Restraints Goal: *Removal from restraints as soon as assessed to be safe Outcome: Progressing Towards Goal 
Goal: *No harm/injury to patient while restraints in use Outcome: Progressing Towards Goal 
Goal: *Patient's dignity will be maintained Outcome: Progressing Towards Goal 
Goal: *Patient Specific Goal (EDIT GOAL, INSERT TEXT) Outcome: Progressing Towards Goal 
Goal: Non-violent Restaints:Standard Interventions Outcome: Progressing Towards Goal 
Goal: Non-violent Restraints:Patient Interventions Outcome: Progressing Towards Goal 
Goal: Patient/Family Education Outcome: Progressing Towards Goal 
  
Problem: Patient Education: Go to Patient Education Activity Goal: Patient/Family Education Outcome: Progressing Towards Goal 
  
Problem: Airway Clearance - Ineffective Goal: Achieve or maintain patent airway Outcome: Progressing Towards Goal 
  
Problem: Gas Exchange - Impaired Goal: Absence of hypoxia Outcome: Progressing Towards Goal 
Goal: Promote optimal lung function Outcome: Progressing Towards Goal 
  
Problem: Breathing Pattern - Ineffective Goal: Ability to achieve and maintain a regular respiratory rate Outcome: Progressing Towards Goal 
  
Problem: Body Temperature -  Risk of, Imbalanced Goal: Ability to maintain a body temperature within defined limits Outcome: Progressing Towards Goal 
Goal: Will regain or maintain usual level of consciousness Outcome: Progressing Towards Goal 
Goal: Complications related to the disease process, condition or treatment will be avoided or minimized Outcome: Progressing Towards Goal 
  
Problem: Isolation Precautions - Risk of Spread of Infection Goal: Prevent transmission of infectious organism to others Outcome: Progressing Towards Goal 
  
Problem: Nutrition Deficits Goal: Optimize nutrtional status Outcome: Progressing Towards Goal 
  
Problem: Risk for Fluid Volume Deficit Goal: Maintain normal heart rhythm Outcome: Progressing Towards Goal 
Goal: Maintain absence of muscle cramping Outcome: Progressing Towards Goal 
Goal: Maintain normal serum potassium, sodium, calcium, phosphorus, and pH Outcome: Progressing Towards Goal 
  
Problem: Loneliness or Risk for Loneliness Goal: Demonstrate positive use of time alone when socialization is not possible Outcome: Progressing Towards Goal 
  
Problem: Fatigue Goal: Verbalize increase energy and improved vitality Outcome: Progressing Towards Goal 
  
Problem: Patient Education: Go to Patient Education Activity Goal: Patient/Family Education Outcome: Progressing Towards Goal

## 2021-01-08 NOTE — PROGRESS NOTES
. 
 
 
Hospitalist Progress Note NAME: Ayah Guerra :  1940 MRN:  [id number] Assessment / Plan: 
 
 
 
Acute on chronic hypoxic respiratory failure usually on 3 liters HOT 
COVID pneumonia Pulmonary fibrosis 
- cont dexamethasone (to complete 10 days with last day ), vitamin C and Zinc 
- s/p remdesivir completed  
- s/p azithromycin,ceftriaxone  
-Continue to be on 2 L oxygen via nasal cannula saturating around 95% when I saw him this morning Chest x-ray today revealed slightly increased right perihilar airspace disease We will ask palliative care to follow-up ARAVIND/CKD stage III  Improved initally now with poor oral intake Creatinine in .3+ and earlier this month 1.92 Dehydration Poor oral intake Hypernatremia, resolved Continue D5W find alternative way of nutrition 
- avoid nephrotoxic drugs 
- will continue to monitor 
- kept NPO as he has aspiration risk with his drowsiness 
-Tube feeding to be started once a rout  is available - Speech eval--> npo - please maintain good oral hygiene 
-Discussed with the patient daughter over the phone and she reported that the patient had issues with swallowing started in 2020 for which she was scheduled with GI to be seen before he got Covid. GI evaluated patient and recommenced alternative way for elective PEG, input is appreciated. Nurse was unable to obtain NG tube. Radiology consultation was placed for Dobbhoff placement Dementia 
 
-We will resume Remeron because the daughter said that helped him a lot before. Discussed with the daughter today and he seems at baseline. 
- will avoid sedating medications 
- one time dose of lorazepam ordered 2021 Restless leg syndrome SSS s/p PPM 
Schatzki ring 18.5 - 24.9 Normal weight / Body mass index is 21.47 kg/m². Code status: DNR confirmed with daughter Prophylaxis: Hep SQ Recommended Disposition: SNF/LTC Subjective: Chief Complaint / Reason for Physician Visit Discussed with RN events overnight. Patient was seen and examined. No acute events overnight. Review of Systems: 
Symptom Y/N Comments  Symptom Y/N Comments Fever/Chills    Chest Pain Poor Appetite    Edema Cough    Abdominal Pain Sputum    Joint Pain SOB/CORRIGAN    Pruritis/Rash Nausea/vomit    Tolerating PT/OT Diarrhea    Tolerating Diet Constipation    Other Could NOT obtain due to: confused Objective: VITALS:  
Last 24hrs VS reviewed since prior progress note. Most recent are: 
Patient Vitals for the past 24 hrs: 
 Temp Pulse Resp BP SpO2  
01/08/21 1105 98.5 °F (36.9 °C) 65 26 91/61   
01/08/21 1104     95 % 01/08/21 0816 97 °F (36.1 °C) 70 26 102/79 95 % 01/08/21 0425 97.7 °F (36.5 °C) 66 25 (!) 96/58 92 % 01/07/21 2216 97.7 °F (36.5 °C) 60 22 (!) 89/56 95 % 01/07/21 1928 97 °F (36.1 °C) 63 25 116/61 96 % 01/07/21 1513 97.3 °F (36.3 °C) 71 29 94/65 95 % Intake/Output Summary (Last 24 hours) at 1/8/2021 1158 Last data filed at 1/8/2021 1105 Gross per 24 hour Intake 2773.34 ml Output 600 ml Net 2173.34 ml I had a face to face encounter and independently examined this patient on 1/8/2021, as outlined below: PHYSICAL EXAM: 
General: chonicaly ill appearing, cachectic, drowsy EENT:  EOMI. Anicteric sclerae. Mucous membranes dry Resp:  Bilateral subscapular fine crackles, no wheezing or rales. No accessory muscle use CV:  Regular  rhythm,  No edema GI:  Soft, Non distended, Non tender. +Bowel sounds Neurologic:  Alert and disoriented (dementia at baseline but he is alert himself today), speech is muffled as his mucous membranes are very dry, Psych:   Poor insight. Not anxious nor agitated Skin:  No rashes. No jaundice Reviewed most current lab test results and cultures  YES Reviewed most current radiology test results   YES 
 Review and summation of old records today    NO 
Reviewed patient's current orders and MAR    YES 
PMH/SH reviewed - no change compared to H&P 
________________________________________________________________________ 
Care Plan discussed with: 
  Comments  
Patient x   
Family  x  patient's daughter over the phone 01/07  
RN x   
Care Manager    
Consultant     
                  Multidiciplinary team rounds were held today with , nursing, pharmacist and clinical coordinator.  Patient's plan of care was discussed; medications were reviewed and discharge planning was addressed.    
________________________________________________________________________ 
Total NON critical care TIME: 35  Minutes 
 
Total CRITICAL CARE TIME Spent:   Minutes non procedure based 
 
  Comments  
>50% of visit spent in counseling and coordination of care    
________________________________________________________________________ 
Josué Dugan MD  
 
Procedures: see electronic medical records for all procedures/Xrays and details which were not copied into this note but were reviewed prior to creation of Plan.   
 
LABS: 
I reviewed today's most current labs and imaging studies. 
Pertinent labs include: 
Recent Labs  
  01/08/21 0427 01/07/21 
0346  
WBC 10.9 9.0  
HGB 16.8 15.8  
HCT 52.1* 48.8  
* 158  
 
Recent Labs  
  01/08/21 0427 01/07/21 0346 01/06/21 
0253  
* 138  --   
K 5.1 4.4  --   
 108  --   
CO2 23 24  --   
* 123*  --   
BUN 48* 51*  --   
CREA 1.27 1.20  --   
CA 8.5 8.4*  --   
INR 1.3* 1.2* 1.6*  
 
 
Signed: Josué Dugan MD

## 2021-01-09 NOTE — PROGRESS NOTES
Patient remains confused and pulled out dubhoff tube and pulling at IV line and condom catheter. Night hospitalist was informed and order was given for two point wrist restraint. No other orders were given. Wrist restraints were then applied as per order.

## 2021-01-09 NOTE — PROGRESS NOTES
Received notification from bedside RN about patient with regards to: patient pulled out dobhoff recently and is now pulling on his IV access. VS: /67, HR 96, RR 22, O2 sat 97% on NC 2 L Intervention given: ordered to start on B/L wrist restraints

## 2021-01-09 NOTE — PROGRESS NOTES
. 
 
 
Hospitalist Progress Note NAME: Petros Barker :  1940 MRN:  [id number] Assessment / Plan: 
 
 
 
Acute on chronic hypoxic respiratory failure usually on 3 liters HOT 
COVID pneumonia Pulmonary fibrosis 
- cont dexamethasone (to complete 10 days with last day ), vitamin C and Zinc 
- s/p remdesivir completed  
- s/p azithromycin,ceftriaxone  
-Continue to be on 2 L oxygen via nasal cannula saturating around 95% when I saw him this morning but more crackles on exam, bp on lower side so unfortunately we can not give lasix now Chest x-ray  revealed slightly increased right perihilar airspace disease 
palliative care consulted ARAVIND/CKD stage III  Improved initally now with poor oral intake Creatinine in .3+ and earlier this month 1.92 Dehydration Poor oral intake Hypernatremia, resolved Continue D5W find alternative way of nutrition 
- avoid nephrotoxic drugs 
- will continue to monitor 
- kept NPO as he has aspiration risk with his drowsiness 
-Tube feeding to be started once a rout  is available - Speech eval--> npo - please maintain good oral hygiene 
-Discussed with the patient daughter over the phone and she reported that the patient had issues with swallowing started in 2020 for which she was scheduled with GI to be seen before he got Covid. GI evaluated patient and recommenced alternative way for elective PEG, input is appreciated. - removed dobbhuf last night 
nurse to try NG tube and if couldn't then try dobbhuf and if we cannot do it then TPN Dementia 
 
-We will resume Remeron because the daughter said that helped him a lot before. - will avoid sedating medications as much as possible  
- one time dose of lorazepam ordered 2021 Restless leg syndrome SSS s/p PPM 
Schatzki ring 18.5 - 24.9 Normal weight / Body mass index is 21.47 kg/m². Code status: DNR confirmed with daughter Prophylaxis: Hep SQ 
 Recommended Disposition: SNF/LTC Subjective: Chief Complaint / Reason for Physician Visit Discussed with RN events overnight. Patient was seen and examined. No acute events overnight. Review of Systems: 
Symptom Y/N Comments  Symptom Y/N Comments Fever/Chills    Chest Pain Poor Appetite    Edema Cough    Abdominal Pain Sputum    Joint Pain SOB/CORRIGAN    Pruritis/Rash Nausea/vomit    Tolerating PT/OT Diarrhea    Tolerating Diet Constipation    Other Could NOT obtain due to: confused Objective: VITALS:  
Last 24hrs VS reviewed since prior progress note. Most recent are: 
Patient Vitals for the past 24 hrs: 
 Temp Pulse Resp BP SpO2  
01/09/21 0834 98.2 °F (36.8 °C) 68 21 104/63 92 % 01/09/21 0345 98.6 °F (37 °C) 65 28 (!) 106/55 93 % 01/08/21 2054 98.4 °F (36.9 °C) 96 22 100/67 97 % 01/08/21 1610 98.4 °F (36.9 °C) 71 22 131/81   
01/08/21 1105 98.5 °F (36.9 °C) 65 26 91/61 93 % 01/08/21 1104     95 % Intake/Output Summary (Last 24 hours) at 1/9/2021 7071 Last data filed at 1/9/2021 3792 Gross per 24 hour Intake 2123.34 ml Output 950 ml Net 1173.34 ml I had a face to face encounter and independently examined this patient on 1/9/2021, as outlined below: PHYSICAL EXAM: 
General: chonicaly ill appearing, cachectic, drowsy EENT:  EOMI. Anicteric sclerae. Mucous membranes dry Resp:  Bilateral subscapular fine crackles, no wheezing or rales. No accessory muscle use CV:  Regular  rhythm,  No edema GI:  Soft, Non distended, Non tender. +Bowel sounds Neurologic:  Alert and disoriented (dementia at baseline but he is alert himself today), speech is muffled as his mucous membranes are very dry, Psych:   Poor insight. Not anxious nor agitated Skin:  No rashes. No jaundice Reviewed most current lab test results and cultures  YES Reviewed most current radiology test results   YES 
 Review and summation of old records today    NO Reviewed patient's current orders and MAR    YES 
PMH/SH reviewed - no change compared to H&P 
________________________________________________________________________ Care Plan discussed with: 
  Comments Patient x Family  x  patient's daughter over the phone 01/09 RN x Care Manager Consultant Multidiciplinary team rounds were held today with , nursing, pharmacist and clinical coordinator. Patient's plan of care was discussed; medications were reviewed and discharge planning was addressed. ________________________________________________________________________ Total NON critical care TIME: 35  Minutes Total CRITICAL CARE TIME Spent:   Minutes non procedure based Comments >50% of visit spent in counseling and coordination of care    
________________________________________________________________________ Tito La MD  
 
Procedures: see electronic medical records for all procedures/Xrays and details which were not copied into this note but were reviewed prior to creation of Plan. LABS: 
I reviewed today's most current labs and imaging studies. Pertinent labs include: 
Recent Labs 01/09/21 
1719 01/08/21 
6603 01/07/21 
2723 WBC 16.4* 10.9 9.0 HGB 13.0 16.8 15.8 HCT 38.9 52.1* 48.8 * 121* 158 Recent Labs 01/09/21 
2121 01/08/21 
3317 01/07/21 
9640 * 133* 138  
K 4.6 5.1 4.4  104 108 CO2 27 23 24 GLU 92 113* 123* BUN 46* 48* 51* CREA 1.38* 1.27 1.20 CA 8.6 8.5 8.4* INR 1.4* 1.3* 1.2* Signed: Tito La MD

## 2021-01-09 NOTE — PROGRESS NOTES
0730-Report taken from [address] [place] [address] Patient confused, agitated overnight. Patient pulled dubhoff tube, tube feeds stopped. Patient NPO. Bilateral wrist restraints in place upon assessment. 0930-Dr. Dugan at bedside to evaluate. Patient pulled his dubhoff tube overnight. IR not available today. NG placement difficult due to large hiatal hernia and patient's mental status. 1240-Dr Dugan messaged via perfect serve. Patient to begin d5 with Ns at 75 an hour and replace dubhoff tube on Monday in IR  
 
1730-Restraint order renewed for 24 hours per Dr. Laura Denise due to pulling lines/tubes. 1900- End of Shift Note Bedside shift change report given to Mai Estrella (oncoming nurse) by Zaire Harrison (offgoing nurse). Report included the following information SBAR, Kardex, Intake/Output, MAR and Recent Results Shift worked:  7a-7p Shift summary and any significant changes:  
  confusion, dubhoff pulled last night, IR to replace on Monday, productive cough. Set up suction in room Concerns for physician to address:  feeding tube replaced when able Zone phone for oncoming shift:    
  
 
Activity: 
Activity Level: Bed Rest, heel boots in place. Number times ambulated in hallways past shift: 0 Number of times OOB to chair past shift: 0 Cardiac:  
Cardiac Monitoring: Yes     
Cardiac Rhythm: Paced Access:  
Current line(s): PIV Genitourinary:  
Urinary status: voiding, condom cath in place Respiratory:  
O2 Device: Nasal cannula Chronic home O2 use?:  
Incentive spirometer at bedside: no, altered mental status GI: 
Last Bowel Movement Date: 01/08/21(Pt unable to verbalize) Current diet:  DIET NPO 
DIET TUBE FEEDING Passing flatus: YES Tolerating current diet: unable due to tube placement issues % Diet Eaten: 0 % Pain Management:  
Patient states pain is manageable on current regimen: YES Skin: 
Oj Score: 11 Interventions: increase time out of bed Patient Safety: Fall Score: Total Score: 4 Interventions: assistive device (walker, cane, etc) High Fall Risk: Yes Length of Stay: 
Expected LOS: 5d 12h Actual LOS: 9 [facility]

## 2021-01-10 NOTE — PROGRESS NOTES
Pt is disoriented and lethargic this shift. VSS, due medications given per orders. All PO meds held-pt NPO at this time. IV patent/infusing, 2L NC, bedrest at this time. Bath provided, q2 turns maintained. Hourly safety rounds maintained. Will continue to monitor and assess.

## 2021-01-10 NOTE — PROGRESS NOTES
Pharmacy Automatic Renal Dosing Protocol - Antimicrobials Indication for Antimicrobials:  sepsis of unknown etiology Current Regimen of Each Antimicrobial: 
Cefepime 2 gm IV q12h (Start Date 1/10; Day # 1) Vancomycin IV (Start date 1/10; day #1) Previous Antimicrobial Therapy: 
 
 
Goal Level: ~15 for now (AUC: 400 - 600 mg/hr/Liter/day) Date Dose & Interval Measured (mcg/mL) Extrapolated (mcg/mL) Date & time of next level:  
 
Significant Cultures:  
 - blood - ng - final 
 
Radiology / Imaging results: (X-ray, CT scan or MRI):  
No abnormalities Paralysis, amputations, malnutrition: none noted Labs: 
Recent Labs 21 
4616 21 
1031 CREA 1.38* 1.27 BUN 46* 48* WBC 16.4* 10.9 Temp (24hrs), Av.2 °F (36.8 °C), Min:97.5 °F (36.4 °C), Max:98.5 °F (36.9 °C) Is the Patient on Dialysis? no 
 
Creatinine Clearance (mL/min):  
CrCl (Actual Body Weight): 43.4 CrCl (Adjusted Body Weight): 45.5 CrCl (Ideal Body Weight): 46.9 Impression/Plan:  
Vancomycin 1500 mg IV x 1 dose then 1250 mg IV q24h for expected trough of ~14 and AUC of 524 Daily bmp - pharmacy to adjust dose if scr changes (no scr for today) Antimicrobial stop date pending Pharmacy will follow daily and adjust medications as appropriate for renal function and/or serum levels. Thank you, Radha Sapp, PHARMD

## 2021-01-10 NOTE — PROGRESS NOTES
. 
 
  
Hospitalist Progress Note NAME: Suzi Acuna :  1940 MRN:  [id number] Assessment / Plan: Dysphasia  
- failed speech ; no PO intake/ dysphasia remain the main issue at this time Pulled dabhoff placed by IR; no NG was able to be placed due to [facility] ; IR to re place dabhoff in am  
IVF now - per chart: pt with swallowing issues since 2020  
-Seen by GI on  to consider PEG: hold off PEG tube at this time given that he is still recovering from his acute illness and his long term prognosis is not yet certain. Alternative means of providing nutrition in the short term: given his medium to large sized hiatal hernia, a dobhoff tube as the first choice or if that is not feasible, then an NGT. If he pulls out the tube or does not tolerate it for any reason, may consider TPN. If the patient makes a full recovery and a PEG tube is desired for long term nutrition needs, we would be happy to reassess him. Lactic acidosis, poa Acute on chronic hypoxic respiratory failure usually on 3 liters HOT 
COVID pneumonia Pulmonary fibrosis - respiratory status stable O2: on baseline 3 L Leukocytosis  - ? steroids  Vs new infectious process BP very soft 90/100, occasionally high 80s ---> dose of albumin Lactic acidosis 2.2 ?  --> IVF/ monitor closely / r/o other source of infection : repeat cxray/ ua ( condom cath)/ BC ; start empiric AB since hypotensive /elevated lactic  
cxray : Slightly increased right parahilar airspace disease  
-monitor wbc/ respiratory status; may consider another cxray Cannot give diuretics due to soft BP  
- completed dexamethasone for 10 days on ),vitamin C and Zinc 
- s/p remdesivir completed  
- s/p azithromycin,ceftriaxone ARAVIND poa on CKD stage III  
- baseline appears to be ~ 1.3-1.7 , stable now 
monitor closely. Avoid nephrotoxic drugs, adjust all meds to GFR. Dementia 
sundowning -  Remeron if able to take PO or has NG  
 Avoid oversedation chest 
 
Hypernatremia, resolved Restless leg syndrome SSS s/p PPM 
Schatzki ring Hyperlipidemia, on statin 1/9: dtr Fara was updated 1/10: called dtr Fara, busy signal, will try again; was able to reach dtr Fara and updated in details, discussed current clinical status , management Code status: DNR Prophylaxis: lovenox Recommended Disposition: SNF/LTC Subjective: Chief Complaint / Reason for Physician Visit: fu covid pneumonia Restraints off since last night; more calm last night; not agitated this am 
Pulled NG placed by IR; unable to replace NG due to [place]  
BP soft Discussed with RN events overnight. Review of Systems: 
Symptom Y/N Comments  Symptom Y/N Comments Fever/Chills    Chest Pain Poor Appetite    Edema Cough    Abdominal Pain Sputum    Joint Pain SOB/CORRIGAN    Pruritis/Rash Nausea/vomit    Tolerating PT/OT Diarrhea    Tolerating Diet Constipation    Other Could NOT obtain due to: Dementia Objective: VITALS:  
Last 24hrs VS reviewed since prior progress note. Most recent are: 
Patient Vitals for the past 24 hrs: 
 Temp Pulse Resp BP SpO2  
01/10/21 0742 98.5 °F (36.9 °C) 70 22 90/60 94 % 01/10/21 0451 98.5 °F (36.9 °C) 82 22 (!) 88/59 95 % 01/09/21 2335 98.2 °F (36.8 °C) 67 23 (!) 99/58 92 % 01/09/21 2014 97.5 °F (36.4 °C) (!) 115 24 102/61 92 % 01/09/21 2000     92 % 01/09/21 1551 97.9 °F (36.6 °C) 69 20 102/64 94 % 01/09/21 1119 98.7 °F (37.1 °C) 62 20 91/68 94 % Intake/Output Summary (Last 24 hours) at 1/10/2021 1105 Last data filed at 1/10/2021 8847 Gross per 24 hour Intake  Output 1200 ml Net -1200 ml I had a face to face encounter and independently examined this patient on 1/10/2021, as outlined below: PHYSICAL EXAM: 
General: chonicaly ill appearing, cachectic, drowsy, open eyes but not communicative EENT:  EOMI. Anicteric sclerae. Mucous membranes dry Resp:  Bilateral fine crackles lower bases, no wheezing or rales. No accessory muscle use CV:  Regular  rhythm,  No edema GI:  Soft, Non distended, Non tender. +Bowel sounds Neurologic:  Drowsy, not communicative, moving all extremities Psych:   Poor insight. Not anxious nor agitated Skin:  No rashes. No jaundice Reviewed most current lab test results and cultures  YES Reviewed most current radiology test results   YES Review and summation of old records today    NO Reviewed patient's current orders and MAR    YES 
PMH/SH reviewed - no change compared to H&P 
________________________________________________________________________ Care Plan discussed with: 
  Comments Patient x Family  x  patient's daughter over the phone 01/09 RN x Care Manager Consultant Multidiciplinary team rounds were held today with , nursing, pharmacist and clinical coordinator. Patient's plan of care was discussed; medications were reviewed and discharge planning was addressed. ________________________________________________________________________ Total NON critical care TIME: 35  Minutes Total CRITICAL CARE TIME Spent:   Minutes non procedure based Comments >50% of visit spent in counseling and coordination of care    
________________________________________________________________________ Kathleen Deleon MD  
 
Procedures: see electronic medical records for all procedures/Xrays and details which were not copied into this note but were reviewed prior to creation of Plan. LABS: 
I reviewed today's most current labs and imaging studies. Pertinent labs include: 
Recent Labs 01/09/21 
0101 01/08/21 
2522 WBC 16.4* 10.9 HGB 13.0 16.8 HCT 38.9 52.1*  
* 121* Recent Labs  
  01/10/21 
0450 01/09/21 
9418 01/08/21 
0427 NA  --  132* 133* K  --  4.6 5.1 CL  --  101 104 CO2  --  27 23 GLU  --  92 113* BUN  --  46* 48* CREA  --  1.38* 1.27 CA  --  8.6 8.5 INR 1.3* 1.4* 1.3* Signed: Irina Awad MD

## 2021-01-10 NOTE — PROGRESS NOTES
0730-Report taken from [facility]. Patient disoriented overnight but calmer. Night shift RN disconnected restraints due to calmer behavior. Patient hypotensive, MEWS 3 unchanged from yesterday. 0930-Discussed patient's hypotension and restraints with Dr. Marli Grace at bedside. Plan for now is to continue hydration until placement of tube on 1/11.  
 
1230-Labs sent per order including paired blood cultures. 1900- End of Shift Note Bedside shift change report given to [facility] (oncoming nurse) by Tiffany Valentine (offgoing nurse). Report included the following information SBAR, Kardex, Intake/Output, MAR and Recent Results Shift worked:  7a-7p Shift summary and any significant changes:  
  repeat lactic drawn, blood cx drawn. Disoriented but calm. Restraints d/c. Concerns for physician to address:  replace feeding tube 1/11 with [employer] 
  
Zone phone for oncoming shift:    
  
 
Activity: 
Activity Level: Bed Rest 
Number times ambulated in hallways past shift: 0 Number of times OOB to chair past shift: 0 Cardiac:  
Cardiac Monitoring: Yes     
Cardiac Rhythm: Paced Access:  
Current line(s): PIV Genitourinary:  
Urinary status: external catheter Respiratory:  
O2 Device: Nasal cannula Chronic home O2 use?: YES Incentive spirometer at bedside: NO 
  
GI: 
Last Bowel Movement Date: (pt unable to state) Current diet:  DIET NPO 
DIET TUBE FEEDING Passing flatus: YES Tolerating current diet: YES 
% Diet Eaten: 0 % Pain Management:  
Patient states pain is manageable on current regimen: YES Skin: 
Oj Score: 11 Interventions: float heels, foam dressing and internal/external urinary devices Patient Safety: 
Fall Score: Total Score: 4 Interventions: pt to call before getting OOB High Fall Risk: Yes Length of Stay: 
Expected LOS: 5d 12h Actual LOS: 736 [facility] [place]

## 2021-01-11 NOTE — PROGRESS NOTES
Speech path Patient is breathing very labored and was not arousing to voice. Not appropriate for po trials. We will sign off.   
Morales Chowdary, SLP

## 2021-01-11 NOTE — PROGRESS NOTES
Pharmacy Automatic Renal Dosing Protocol - Antimicrobials Indication for Antimicrobials:  sepsis of unknown etiology Current Regimen of Each Antimicrobial: 
Cefepime 2 gm IV q12h (Start Date 1/10; Day # 2) Vancomycin 1.5gm IV x1, then 1.25gm IV q24h; (Start date 1/10; day #2) Previous Antimicrobial Therapy: 
 
 
Goal Level: ~15 for now (AUC: 400 - 600 mg/hr/Liter/day) Date Dose & Interval Measured (mcg/mL) Extrapolated (mcg/mL) Date & time of next level:  
 
Significant Cultures:  
: Blood = No Growth (FINAL) Radiology / Imaging results: (X-ray, CT scan or MRI):  
No abnormalities Paralysis, amputations, malnutrition: none noted Labs: 
Recent Labs  
  21 
0015 01/10/21 
1209 21 
1196 CREA 1.08 1.37* 1.38* BUN 42* 46* 46* WBC 10.2 12.5* 16.4* Temp (24hrs), Av.5 °F (36.9 °C), Min:97.8 °F (36.6 °C), Max:98.9 °F (37.2 °C) Is the Patient on Dialysis? no 
 
Creatinine Clearance (mL/min):  
CrCl (Actual Body Weight): 55.4 CrCl (Adjusted Body Weight): 58.1 CrCl (Ideal Body Weight): 59.9 Impression/Plan:  
Renal function improved; change Vancomycin to 750mg IV q12h to maintain Trough ~15 and -600 Continue Cefepime 2gm IV q12h Daily BMP Antimicrobial stop date pending Pharmacy will follow daily and adjust medications as appropriate for renal function and/or serum levels. Thank you, 
Mariel Parra, [facility] Recommended duration of therapy 
http://[facility]/[place]/[facility]/Pharmacy/Clinical%20Companion/Duration%20of%20ABX%20therapy. docx Renal Dosing 
http://[facility]/[place]/[facility]/Pharmacy/Clinical%20Companion/Renal%[id number]. pdf

## 2021-01-11 NOTE — PROGRESS NOTES
Comprehensive Nutrition Assessment Type and Reason for Visit: Robert Lamb Nutrition Recommendations/Plan:  
Resume TF as able once new BHT is replaced · Jevity 1.5 @ 20 mL/hr and advance as tolerated by 10 mL q 8 hours to goal rate of 60 mL/hr + 150 mL water flushes q 4 hours (provides 2160 kcal, 92g protein, 1994 mL water) · TF recommendations adequate to meet 100% of estimated kcal/protein needs · Consider discontinuing/adjusting IVF once goal rate reached Per MD, consider TPN if unable to reinsert or keep DHT in place Please obtain new measured weight. Last charted wt was 12/30. Suspect wt loss d/t minimal nutrition intake x11 days. Nutrition Assessment:     
Chart reviewed. Pt pulled DHT out on 1/8. He has been without nutrition throughout the weekend and for the majority of his stay here (11 days). IR to replace DHT today. Per MD's noted, may consider TPN if unable to keep [address] in [address]. Pt remains confused and unsafe for PO. No weight taken since 12/30. Suspect weight loss. New wt ordered for accurate assessment and wt trend data. RD unable to visit bedside d/t covid isolation. Malnutrition Assessment: 
Malnutrition Status: At risk d/t minimal nutrition intake x11 days. Estimated Daily Nutrient Needs: 
Energy (kcal): 1908 kcal (BMR 1468 x 1. 3AF); Weight Used for Energy Requirements: Admission Protein (g): 86g (1.2 g/kg bw); Weight Used for Protein Requirements: Current Fluid (ml/day): 1900 mL; Method Used for Fluid Requirements: 1 ml/kcal 
 
 
Nutrition Related Findings:  Labs reviewed. Meds: cefepime, lovenox, D5/NS, glycopyrrolate, protonix, vanc. Wounds:   
None Current Nutrition Therapies: DIET NPO 
DIET TUBE FEEDING Anthropometric Measures: 
· Height:  6' (182.9 cm) · Current Body Wt:  71.8 kg (158 lb 4.6 oz) · Ideal Body Wt:  178 lbs:  88.9 % · BMI Category:  Normal weight (BMI 18.5-24. 9) Nutrition Diagnosis: · Inadequate protein-energy intake related to (dysphagia, dementia) as evidenced by NPO or clear liquid status due to medical condition Previous diagnosis continues. Nutrition Interventions:  
Food and/or Nutrient Delivery: Continue tube feeding Nutrition Education and Counseling: No recommendations at this time Coordination of Nutrition Care: Continue to monitor while inpatient Goals: 
TF resumed with replacing DHT and advance to goal next 2-4 days Nutrition Monitoring and Evaluation:  
Behavioral-Environmental Outcomes: None identified Food/Nutrient Intake Outcomes: Enteral nutrition intake/tolerance Physical Signs/Symptoms Outcomes: Biochemical data, Weight, GI status Discharge Planning: Too soon to determine Electronically signed by Chante Soliman RD on 1/11/2021 at 1:45 PM 
 
Contact: [facility]-[phone] Pager [phone]

## 2021-01-11 NOTE — PROGRESS NOTES
End of Shift Note Bedside shift change report given to Fallon (oncoming nurse) by Adrienne Gaitan (offgoing nurse). Report included the following information SBAR, Kardex and Intake/Output Shift worked:  night Shift summary and any significant changes:  
  Pt has remained NPO. Mouth swabs and suction at bedside. Patient drowsy throughout the shift. Has very coarse lung sounds with some crackles present. Oxygen sats remained WNL overnight. Fluids still going at 75 mL/hr 
  
Concerns for physician to address:   
  
Zone phone for oncoming shift:    
  
 
Activity: 
Activity Level: Bed Rest 
Number times ambulated in hallways past shift: 0 Number of times OOB to chair past shift: 0 Cardiac:  
Cardiac Monitoring: Yes     
Cardiac Rhythm: Paced Access:  
Current line(s): PIV Genitourinary:  
Urinary status: voiding and external catheter Respiratory:  
O2 Device: Nasal cannula Chronic home O2 use?: YES Incentive spirometer at bedside: NO 
  
GI: 
Last Bowel Movement Date: (pt unable to state) Current diet:  DIET NPO 
DIET TUBE FEEDING Passing flatus: YES Tolerating current diet: YES 
% Diet Eaten: 0 % Pain Management:  
Patient states pain is manageable on current regimen: N/A Skin: 
Oj Score: 11 Interventions: speciality bed, float heels and foam dressing Patient Safety: 
Fall Score: Total Score: 4 Interventions: bed/chair alarm, assistive device (walker, cane, etc) and pt to call before getting OOB High Fall Risk: Yes Length of Stay: 
Expected LOS: 5d 12h Actual LOS: [address] [facility]

## 2021-01-11 NOTE — PROGRESS NOTES
Problem: Falls - Risk of 
Goal: *Absence of Falls Description: Document New Amaya Fall Risk and appropriate interventions in the flowsheet. Outcome: Progressing Towards Goal 
Note: Fall Risk Interventions: 
Mobility Interventions: Patient to call before getting OOB Mentation Interventions: Adequate sleep, hydration, pain control Medication Interventions: Patient to call before getting OOB, Teach patient to arise slowly Elimination Interventions: Call light in reach History of Falls Interventions: Consult care management for discharge planning Problem: Patient Education: Go to Patient Education Activity Goal: Patient/Family Education Outcome: Progressing Towards Goal 
  
Problem: Pressure Injury - Risk of 
Goal: *Prevention of pressure injury Description: Document Oj Scale and appropriate interventions in the flowsheet. Outcome: Progressing Towards Goal 
Note: Pressure Injury Interventions: 
Sensory Interventions: Assess changes in LOC Moisture Interventions: Minimize layers Activity Interventions: Increase time out of bed Mobility Interventions: Float heels Nutrition Interventions: Document food/fluid/supplement intake Friction and Shear Interventions: Minimize layers Problem: Patient Education: Go to Patient Education Activity Goal: Patient/Family Education Outcome: Progressing Towards Goal 
  
Problem: Non-Violent Restraints Goal: *Removal from restraints as soon as assessed to be safe Outcome: Progressing Towards Goal 
Goal: *No harm/injury to patient while restraints in use Outcome: Progressing Towards Goal 
Goal: *Patient's dignity will be maintained Outcome: Progressing Towards Goal 
Goal: *Patient Specific Goal (EDIT GOAL, INSERT TEXT) Outcome: Progressing Towards Goal 
Goal: Non-violent Restaints:Standard Interventions Outcome: Progressing Towards Goal 
Goal: Non-violent Restraints:Patient Interventions Outcome: Progressing Towards Goal 
 Goal: Patient/Family Education Outcome: Progressing Towards Goal 
  
Problem: Patient Education: Go to Patient Education Activity Goal: Patient/Family Education Outcome: Progressing Towards Goal 
  
Problem: Airway Clearance - Ineffective Goal: Achieve or maintain patent airway Outcome: Progressing Towards Goal 
  
Problem: Gas Exchange - Impaired Goal: Absence of hypoxia Outcome: Progressing Towards Goal 
Goal: Promote optimal lung function Outcome: Progressing Towards Goal 
  
Problem: Breathing Pattern - Ineffective Goal: Ability to achieve and maintain a regular respiratory rate Outcome: Progressing Towards Goal 
  
Problem: Body Temperature -  Risk of, Imbalanced Goal: Ability to maintain a body temperature within defined limits Outcome: Progressing Towards Goal 
Goal: Will regain or maintain usual level of consciousness Outcome: Progressing Towards Goal 
Goal: Complications related to the disease process, condition or treatment will be avoided or minimized Outcome: Progressing Towards Goal 
  
Problem: Nutrition Deficits Goal: Optimize nutrtional status Outcome: Progressing Towards Goal 
  
Problem: Risk for Fluid Volume Deficit Goal: Maintain normal heart rhythm Outcome: Progressing Towards Goal 
Goal: Maintain absence of muscle cramping Outcome: Progressing Towards Goal 
Goal: Maintain normal serum potassium, sodium, calcium, phosphorus, and pH Outcome: Progressing Towards Goal 
  
Problem: Loneliness or Risk for Loneliness Goal: Demonstrate positive use of time alone when socialization is not possible Outcome: Progressing Towards Goal 
  
Problem: Fatigue Goal: Verbalize increase energy and improved vitality Outcome: Progressing Towards Goal 
  
Problem: Patient Education: Go to Patient Education Activity Goal: Patient/Family Education Outcome: Progressing Towards Goal

## 2021-01-11 NOTE — PROGRESS NOTES
9721 - Dr. Priyanka Faustin notified via [employer] of patient having labored breathing pattern, ordere lasix which was administered at 9:52 am, helped slightly. 26 - Dr. Priyanka Faustin notified via [employer] of patient's continued labored breathing

## 2021-01-11 NOTE — CONSULTS
Palliative Medicine Consult Daniel: [phone] ([phone]) Patient Name: Miguel Hunter YOB: 1940 Date of Initial Consult: 1/11/2021 Reason for Consult: End Stage Disease Requesting Provider: Samantha Wren MD 
Primary Care Physician: Ian Anaya NP 
 
 SUMMARY:  
Miguel Hunter is a [de-identified] y.o. with a past history of arthritis, basal cell carcinoma, CAD, AKD III, ILD, and COPD who was admitted on 12/30/2020 from home with a diagnosis of acute hypoxic respiratory failure with COVID 19 pneumonia in the setting of ILD, and COPD on 3lpm of oxygen at baseline. Current medical issues leading to Palliative Medicine involvement include:  
 
Social:  Mr Storm was born dead, but was a very brilliant man. He worked as a  for [employer] His wife was a professor of Anatomy at [employer], and they had 5 children. 2 pharmacisits, a physician at [employer] and 2 professors. THey all live very close, and he was quite an active man up untill this hospitalization PALLIATIVE DIAGNOSES:  
1. Goals of care 2. Shortness of breath 3. Frailty 4. Unresponsive PLAN:  
1. Viewed Mr Storm from the window, he appears uncomfortable, labored breathing, and is unresponsive per the nurse. 2. I talked with his daughter Patrick Gomez at length on the phone. She shared the story of their moms passing, how it was quite sudden, and they still have many unanswered questions. Because of this, the family want to make sure they explored every option, even if they know in their heart it is likely futile. 3. Because of this, she does want to go ahead with replacing the feeding tube, but she does verbalize that she knows it likely will not change the outcome 4. Later that morning, I heard from her that she talked to the attending, and his breathing is too precarious to go ahead with tube placement. 5. We made a plan for her siblings to come and visit with their dad tonight, so they can better make a decision on comfort measures. Fara herself cannot come as she is not off quarantine until next Monday 6. I taked with her sister Govind Liu- that conversation was limited as she was quite tearful, but she was able to give me the names of the children coming this evening, and I reviewed with her the visitation policy. One family member will able to go in the room with Mr Colette Hudson if they want, and the rest can view from the window. 7. Will follow up tomorrow with them if he survives the night to talk about CMO 8. Initial consult note routed to primary continuity provider and/or primary health care team members 9. Communicated plan of care with: Palliative [place] [facility] Team 
 
 GOALS OF CARE / TREATMENT PREFERENCES:  
 
GOALS OF CARE: 
Patient/Health Care Proxy Stated Goals: Comfort TREATMENT PREFERENCES:  
Code Status: Prior Advance Care Planning: 
[] [facility] Interdisciplinary Team has updated the ACP Navigator with Perez and Patient Capacity Advance Care Planning 7/16/2018 Patient's Healthcare Decision Maker is: Legal Next of Kin Primary Decision Maker Name -  
Primary Decision Maker Phone Number -  
Primary Decision Maker Relationship to Patient - Secondary Decision Maker Name - Secondary Decision Maker Phone Number - Secondary Decision Maker Relationship to Patient -  
Confirm Advance Directive None Patient Would Like to Complete Advance Directive - Does the patient have other document types - Medical Interventions: Limited additional interventions Other Instructions:  
Artificially Administered Nutrition: No feeding tube Other: As far as possible, the palliative care team has discussed with patient / health care proxy about goals of care / treatment preferences for patient. HISTORY:  
 
History obtained from: chart CHIEF COMPLAINT: unresponsive HPI/SUBJECTIVE: The patient is:  
[] Verbal and participatory [x] Non-participatory due to:  
 
condition Clinical Pain Assessment (nonverbal scale for severity on nonverbal patients):  
Clinical Pain Assessment Severity: 0 Activity (Movement): Lying quietly, normal position Duration: for how long has pt been experiencing pain (e.g., 2 days, 1 month, years) Frequency: how often pain is an issue (e.g., several times per day, once every few days, constant) FUNCTIONAL ASSESSMENT:  
 
Palliative Performance Scale (PPS): PPS: 20 
 
 
 PSYCHOSOCIAL/SPIRITUAL SCREENING:  
 
Palliative IDT has assessed this patient for cultural preferences / practices and a referral made as appropriate to needs (Cultural Services, Patient Advocacy, Ethics, etc.) Any spiritual / Buddhism concerns: 
[] Yes /  [x] No 
 
Caregiver Burnout: 
[] Yes /  [x] No /  [] No Caregiver Present Anticipatory grief assessment:  
[x] Normal  / [] Maladaptive ESAS Anxiety: Anxiety: 0 
 
ESAS Depression: Depression: 0 REVIEW OF SYSTEMS:  
 
Positive and pertinent negative findings in ROS are noted above in HPI. The following systems were [x] reviewed / [] unable to be reviewed as noted in HPI Other findings are noted below. Systems: constitutional, ears/nose/mouth/throat, respiratory, gastrointestinal, genitourinary, musculoskeletal, integumentary, neurologic, psychiatric, endocrine. Positive findings noted below. Modified ESAS Completed by: provider Fatigue: 10    
Depression: 0 Pain: 0 Anxiety: 0 Nausea: 0 Dyspnea: 5 PHYSICAL EXAM:  
 
From RN flowsheet: 
Wt Readings from Last 3 Encounters:  
01/11/21 143 lb 4.8 oz (65 kg) 12/15/20 156 lb 4.9 oz (70.9 kg) 12/09/19 183 lb (83 kg) Blood pressure (!) 71/39, pulse 79, temperature 98.1 °F (36.7 °C), resp. rate 16, height 6' (1.829 m), weight 143 lb 4.8 oz (65 kg), SpO2 91 %. Pain Scale 1: Adult Nonverbal Pain Scale Pain Intensity 1: 0 Pain Intervention(s) 1: Medication (see MAR) Last bowel movement, if known:  
 
Constitutional: frail, obtunded Respiratory: breathing labored, symmetric HISTORY:  
 
Active Problems: 
  Pulmonary edema (12/31/2020) Acute respiratory failure due to COVID-19 [facility]) (12/31/2020) Goals of care, counseling/discussion () Shortness of breath () Frailty () Unresponsive () Past Medical History:  
Diagnosis Date  Arthritis   
 parris knee  Asthma   
 dr Frankie Lantigua 5/18/17  Basal cell carcinoma of skin 05/10/2017  
 right helix Jose David Baez's note rec'd  CAD (coronary artery disease)   
 dr Tono Leonard  CKD (chronic kidney disease), stage III 05/2014 Shawn Duran notes 8/8/17 note and lab  Colon polyps 09/25/13  
 also dad with colon cancer  Contact dermatitis and other eczema, due to unspecified cause   
 eczema dr Reta Norton  COPD   
 dr May Peabody chronic bronchitis     5/18/17  Elevated serum creatinine  Fall 11 & 12/2012 Right knee gave away and tripped in his house  GERD (gastroesophageal reflux disease)   
 dr Jorge Badillo (hard of hearing)  Hypercholesterolemia  ILD (interstitial lung disease) ([facility] [address] 07/15/2014 Dr Júnior Manley's   pulmonary fibrosis 5/18/17 f/u note  Proteinuria 10/2012  
 abou assi     12/2/16  RLS (restless legs syndrome)  Schatzki's ring 5/03  
 dilation dr Diana Dominguez  Screening for glaucoma 09/21/2016 Sade Valdes note rec'd (annual eye exam)  Shingles 4/2012  Sleep apnea   
 uses CPAP- 7/6/17 f/u note [facility] note/sleep study 9/8/17 Past Surgical History:  
Procedure Laterality Date  ENDOSCOPY, COLON, DIAGNOSTIC    
 8/08 dr Librado Galarza repeat in 5 yrs  HX COLONOSCOPY  [id number]  
 brady- ileocecal valve polyp 5 yr repeat Grazyna SIMS  Summer 2017 Right ear basal cell carcinoma  HX HERNIA REPAIR  2018 Open left inguinal hernia repair with mesh.  HX PACEMAKER  2017 Dr. Jadyn Cutler    
 left inq hernia  [place] CARDIAC SURG PROCEDURE UNLIST  2017 Pacemaker  [place] EXTRAC ERUPTED TOOTH/EXPOSED ROOT  2017  
 3 teeth extraction. Family History Problem Relation Age of Onset  Cancer Father   
      from colon cancer History reviewed, no pertinent family history. Social History Tobacco Use  Smoking status: Former Smoker  Smokeless tobacco: Former User Quit date: 1985 Substance Use Topics  Alcohol use: No  
 
No Known Allergies No current facility-administered medications for this encounter. Current Outpatient Medications Medication Sig  
 arformoteroL (Brovana) 15 mcg/2 mL nebu neb solution 15 mcg by Nebulization route two (2) times a day.  budesonide (PULMICORT) 0.5 mg/2 mL nbsp 500 mcg by Nebulization route two (2) times a day.  ergocalciferol (Vitamin D2) 1,250 mcg (50,000 unit) capsule Take 50,000 Units by mouth every seven (7) days.  famotidine (PEPCID) 20 mg tablet Take 20 mg by mouth two (2) times a day.  mirtazapine (REMERON) 15 mg tablet Take 15 mg by mouth nightly.  pantoprazole (Protonix) 40 mg tablet Take 40 mg by mouth daily.  aspirin delayed-release 81 mg tablet Take  by mouth daily.  albuterol (PROVENTIL VENTOLIN) 2.5 mg /3 mL (0.083 %) nebulizer solution 3 mL by Nebulization route every four (4) hours as needed for Wheezing or Shortness of Breath.  ALBUTEROL SULFATE (PROAIR HFA IN) Take 2 puffs by inhalation four (4) times daily as needed.  loratadine (CLARITIN) 10 mg tablet Take 10 mg by mouth daily.  coenzyme q10 30 mg capsule Take 30 mg by mouth three (3) times daily. LAB AND IMAGING FINDINGS:  
 
Lab Results Component Value Date/Time  WBC 10.2 2021 12:15 AM  
 HGB 13.9 2021 12:15 AM  
 PLATELET 071 (L) 24/31/3202 12:15 AM  
 
Lab Results Component Value Date/Time Sodium 142 01/11/2021 12:15 AM  
 Potassium 4.4 01/11/2021 12:15 AM  
 Chloride 114 (H) 01/11/2021 12:15 AM  
 CO2 24 01/11/2021 12:15 AM  
 BUN 42 (H) 01/11/2021 12:15 AM  
 Creatinine 1.08 01/11/2021 12:15 AM  
 Calcium 8.5 01/11/2021 12:15 AM  
 Magnesium 2.2 01/11/2021 12:15 AM  
 Phosphorus 2.8 01/11/2021 12:15 AM  
  
Lab Results Component Value Date/Time Alk. phosphatase 69 01/11/2021 12:15 AM  
 Protein, total 6.1 (L) 01/11/2021 12:15 AM  
 Albumin 2.1 (L) 01/11/2021 12:15 AM  
 Globulin 4.0 01/11/2021 12:15 AM  
 
Lab Results Component Value Date/Time INR 1.3 (H) 01/11/2021 12:15 AM  
 Prothrombin time 13.0 (H) 01/11/2021 12:15 AM  
  
Lab Results Component Value Date/Time Ferritin 134 12/30/2020 11:03 PM  
  
No results found for: PH, PCO2, PO2 No components found for: [place] Lab Results Component Value Date/Time CK 81 12/30/2020 11:03 PM  
 CK - MB 2.1 12/30/2020 11:03 PM  
  
 
 
   
 
Total time:  
Counseling / coordination time, spent as noted above:  
> 50% counseling / coordination?:  
 
Prolonged service was provided for  []30 min   []75 min in face to face time in the presence of the patient, spent as noted above. Time Start:  
Time End:  
Note: this can only be billed with 16678 (initial) or 16451 (follow up). If multiple start / stop times, list each separately.

## 2021-01-11 NOTE — PROGRESS NOTES
[address] [facility] - Dr. Chito Simmons was notified via [facility] of patient's last blood pressure of 82/52, asked to recheck blood pressure in 30 minutes and said we are not doing danhoff NG tube today. 65 - Dr. Chito Simmons notified that last blood pressure was 82/48, said she would give a dose of albuterol.

## 2021-01-11 NOTE — PROGRESS NOTES
. 
 
  
Hospitalist Progress Note NAME: Monae Schultz :  1940 MRN:  [id number] Assessment / Plan: Dysphasia  
- failed speech ; no PO intake/ dysphasia remain the main issue at this time Pulled dabhoff placed by IR; no NG was able to be placed due to [facility] Was hoping to re place dabhoff today but not a candidate due to respiratory status / declining IVF now ( slow down rate) - per chart: pt with swallowing issues since 2020  
-Seen by GI on  to consider PEG: hold off PEG tube at this time given that he is still recovering from his acute illness and his long term prognosis is not yet certain. Alternative means of providing nutrition in the short term: given his medium to large sized hiatal hernia, a dobhoff tube as the first choice or if that is not feasible, then an NGT. If he pulls out the tube or does not tolerate it for any reason, may consider TPN. If the patient makes a full recovery and a PEG tube is desired for long term nutrition needs, we would be happy to reassess him. Lactic acidosis, poa resolved Acute on chronic hypoxic respiratory failure usually on 3 liters HOT 
COVID pneumonia Pulmonary fibrosis - respiratory status deteriorating O2: on baseline 3 L Leukocytosis  - ? steroids  Vs new infectious process BP very soft 90/100, occasionally high 80s ---> dose of albumin Lactic acidosis 2.2 ?  --> IVF/ monitor closely / r/o other source of infection : repeat cxray/ ua ( condom cath)/ BC ; start empiric AB since hypotensive /elevated lactic  
cxray : Slightly increased right parahilar airspace disease  
-monitor wbc/ respiratory status; may consider another cxray Cannot give diuretics due to soft BP  
- completed dexamethasone for 10 days on ),vitamin C and Zinc 
- s/p remdesivir completed  
- s/p azithromycin,ceftriaxone ARAVIND poa on CKD stage III  
- baseline appears to be ~ 1.3-1.7 , stable now monitor closely. Avoid nephrotoxic drugs, adjust all meds to GFR. Dementia 
sundowning -  Remeron if able to take PO or has NG Avoid oversedation chest 
 
Hypernatremia, resolved Restless leg syndrome SSS s/p PPM 
Schatzki ring Hyperlipidemia, on statin 1/9: dtr Fara was updated 1/10: called dtr Fara, busy signal, will try again; was able to reach dtr Fara and updated in details, discussed current clinical status , management 1/11: spoke with dtr twice ; agreed on comfort/ morphine prn Code status: DNR Prophylaxis: lovenox Recommended Disposition: actively dying Subjective: Chief Complaint / Reason for Physician Visit: fu covid pneumonia Unresponsive Shallow breathing/ tachypnea Discussed with RN events overnight. Review of Systems: 
Symptom Y/N Comments  Symptom Y/N Comments Fever/Chills    Chest Pain Poor Appetite    Edema Cough    Abdominal Pain Sputum    Joint Pain SOB/CORRIGAN    Pruritis/Rash Nausea/vomit    Tolerating PT/OT Diarrhea    Tolerating Diet Constipation    Other Could NOT obtain due to: Dementia Objective: VITALS:  
Last 24hrs VS reviewed since prior progress note. Most recent are: 
Patient Vitals for the past 24 hrs: 
 Temp Pulse Resp BP SpO2  
01/11/21 1623  76  (!) 82/48 90 % 01/11/21 1538 97.5 °F (36.4 °C) 79 (!) 32 (!) 82/52 92 % 01/11/21 1232 97.5 °F (36.4 °C) 86 30 (!) 105/57 92 % 01/11/21 0807 97.8 °F (36.6 °C) 81 30 (!) 135/33 91 % 01/11/21 0702     93 % 01/11/21 0309 98.1 °F (36.7 °C) 69 20 118/70 94 % 01/10/21 2302 98.8 °F (37.1 °C) 70 18 (!) 127/56 96 % Intake/Output Summary (Last 24 hours) at 1/11/2021 1817 Last data filed at 1/11/2021 1250 Gross per 24 hour Intake  Output 775 ml Net -775 ml I had a face to face encounter and independently examined this patient on 1/11/2021, as outlined below: PHYSICAL EXAM: 
 General: chonicaly ill appearing, cachectic, unresponsive EENT:  EOMI. Anicteric sclerae. Mucous membranes dry Resp:  Bilateral fine crackles lower bases, no wheezing or rales. No accessory muscle use CV:  Regular  rhythm,  No edema GI:  Soft, Non distended, Non tender. +Bowel sounds Neurologic:  Unresponsive Psych:   Poor insight. Not anxious nor agitated Skin:  No rashes. No jaundice Reviewed most current lab test results and cultures  YES Reviewed most current radiology test results   YES Review and summation of old records today    NO Reviewed patient's current orders and MAR    YES 
PMH/SH reviewed - no change compared to H&P 
________________________________________________________________________ Care Plan discussed with: 
  Comments Patient x Family  x  patient's daughter over the phone RN x Care Manager Consultant  x Palliative Multidiciplinary team rounds were held today with , nursing, pharmacist and clinical coordinator. Patient's plan of care was discussed; medications were reviewed and discharge planning was addressed. ________________________________________________________________________ Total NON critical care TIME: 45 Minutes Total CRITICAL CARE TIME Spent:   Minutes non procedure based Comments >50% of visit spent in counseling and coordination of care y   
________________________________________________________________________ Samantha Sarmiento MD  
 
Procedures: see electronic medical records for all procedures/Xrays and details which were not copied into this note but were reviewed prior to creation of Plan. LABS: 
I reviewed today's most current labs and imaging studies. Pertinent labs include: 
Recent Labs  
  01/11/21 
0015 01/10/21 
1209 01/09/21 
6201 WBC 10.2 12.5* 16.4* HGB 13.9 14.5 13.0 HCT 44.5 44.0 38.9 * 152 131* Recent Labs  
  01/11/21 
0015 01/10/21 
1209 01/10/21 2294 01/09/21 
4804  140  --  132* K 4.4 4.4  --  4.6 * 110*  --  101 CO2 24 28  --  27  
GLU 97 112*  --  92 BUN 42* 46*  --  46* CREA 1.08 1.37*  --  1.38* CA 8.5 8.7  --  8.6 MG 2.2  --   --   --   
PHOS 2.8  --   --   --   
ALB 2.1*  --   --   --   
TBILI 1.3*  --   --   --   
ALT 16  --   --   --   
INR 1.3*  --  1.3* 1.4* Signed: Yuni Espinosa MD

## 2021-01-12 NOTE — PROGRESS NOTES
End of Shift Note Bedside shift change report given to Aspen Means (oncoming nurse) by Donna Vidal RN (offgoing nurse). Report included the following information SBAR, Kardex, Intake/Output, MAR and Recent Results Shift worked:  Day Shift summary and any significant changes:  
 Patient has had increased respirations all day (in the 30s). Lasix had a minimal effect on his respirations, morphine was ordered and given this evening to try and ease labored breathing. Blood pressures have decreased (last one 82/48). Family arrived to the unit around 6:50 pm to visit patient. Concerns for physician to address:  comfort care/hospice? Zone phone for oncoming shift:   [id number] Activity: 
Activity Level: Bed Rest 
Number times ambulated in hallways past shift: 0 Number of times OOB to chair past shift: 0 Cardiac:  
Cardiac Monitoring: Yes     
Cardiac Rhythm: Paced Access:  
Current line(s): PIV Genitourinary:  
Urinary status: incontinent and external catheter Respiratory:  
O2 Device: Nasal cannula Chronic home O2 use?: NO Incentive spirometer at bedside: YES 
  
GI: 
Last Bowel Movement Date: (pt unable to state) Current diet:  DIET NPO Passing flatus: YES Tolerating current diet: YES 
% Diet Eaten: 0 %(Comfort Care) Pain Management:  
Patient states pain is manageable on current regimen: YES Skin: 
Oj Score: 11 Interventions: float heels and increase time out of bed Patient Safety: 
Fall Score: Total Score: 4 Interventions: gripper socks and pt to call before getting OOB High Fall Risk: Yes Length of Stay: 
Expected LOS: 5d 12h Actual LOS: Via Christian Stahl RN

## 2021-01-12 NOTE — PROGRESS NOTES
2035 - notified ROYCE Meyers NP of hypotension 71/39. Patient very lethargic, not responsive to verbal or pain. MEWs score 6 at this time. Left pupil 3 and reactive, right pupil 2 and sluggish. 2115 - NP at bedside to evaluate patient and speak with family 2146 - comfort measures initiated

## 2021-01-12 NOTE — ACP (ADVANCE CARE PLANNING)
Advance Care Planning Note NAME: Micah Mendoza :  1940 MRN:  [id number] Date/Time:  2021 9:27 PM 
 
Active Diagnoses: 
Hospital Problems  Date Reviewed: 10/9/2018 Codes Class Noted POA Pulmonary edema ICD-10-CM: J81.1 ICD-9-CM: 750  2020 Unknown Acute respiratory failure due to COVID-19 [facility]) ICD-10-CM: U07.1, J96.00 
ICD-9-CM: 518.81, 079.89  2020 Unknown These active diagnoses are of sufficient risk that focused discussion on advance care planning is indicated in order to allow the patient to thoughtfully consider personal goals of care, and if situations arise that prevent the ability to personally give input, to ensure appropriate representation of their personal desires for different levels and aggressiveness of care. Discussion:  
Code status had been addressed and wants to be a DNR / DNI. Patient POA does not wants central line and vasopressors if needed and would like patient to be comfort care only. Patient's POA would not want a feeding tube, if needed, for nutritional support. Patient's POA provided documentation that patient has a signed Nai Palafox (daughter) and Héctor Butler (son) as the surrogate decision maker. Persons present and participating in discussion: Micah Mendoza (not participating due to unresponsiveness), Nai Palafox (Daughter), Héctor Butler (Son), and  JENNIFER Holland Time Spent:  
Total time spent face-to-face in education and discussion:   20  minutes. Sarah Meyers DNP, ACNP-BC Hospitalist

## 2021-01-12 NOTE — DEATH NOTE
Called to bedside to pronounce death after patient was noted to be in asystole. NO pupillary, corneal, doll's eye or gag reflex noted. NO heart sounds or pulse noted. NO Chest rise or Lung sounds noted. NO response to painful stimuli. Time of death declared at 56. Signed: Yin Taylor DNP, ACNP-BC  
1/12/2021  1:02 AM 
 
Primary attending to dictate death / discharge summary

## 2021-01-12 NOTE — PROGRESS NOTES
Responded to paged from nursing staff on Ortho to notify patient death. Met with family and offered support for them as they briefly shared their feelings of grief. Offered words of comfort and assurance of prayer. Rev. Emmanuel Urbina Paging Service: [phone])

## 2021-01-12 NOTE — PROGRESS NOTES
Received message from patient's nurse Jesse Johns stating : 
 
Patient admitted 12/30 for lethargy, acute resp failure, covid positive. Hypotensive at 71/39, RR 20, O2 sat 91% on 4 L. Very lethargic, not responsive to verbal or pain. MEWs score populated at 6 for 2023. BP has been soft today, last BP at 1623 was 82/48. Family present. L pupil reactive and 3, R pupil sluggish and 2 Family at bedside says that patient is less responsive than earlier today. Patient did receive 1 mg IV morphine. BP is also decreasing. I did try to arouse patient via sternal rub with no results. I am aware that family is considering hospice/comfort at this time. Morphine was given at [facility] Discussion / orders: 
 
Presented to patient's bedside Patient is in bed unresponsive with his daughter Andi Black at bedside and his son and another family member outside the room. Patient was admitted on December 30, 2020 for Covid pneumonia and has had a progressive decline. Has developed dysphagia and has not had any p.o. intake for quite some time. Had a Dobbhoff tube placed that I order but he was pulled out and no other NG tube was able to be placed due to hiatal hernia. It was clarified that patient's daughter Jalen Turner and his son To Chandra are legal power of  and medical power of 's jointly. Patient's daughter, Andi Black that patient's power of  and medical power of  documents on her phone and presented to me as evidence. She also clarified that Fara Villa, patient's daughter is also involved in patient's medical decision making although not legal power of . Patient's son and daughter are very realistic and aware of patient's continuous decline. They stated to me that they were under the impression that patient was already receiving hospice and comfort care measures. I explained to them that these measures have not been ordered. They did call patient's daughter daughter Kimberlee Kee and discussed with her patient's current status. They agree that the best course of action for patient would be comfort care only and for hospice to be consulted. I placed order for hospice consult and also comfort measures and medications Please note that this note was dictated using Dragon computer voice recognition software. Quite often unanticipated grammatical, syntax, homophones, and other interpretive errors are inadvertently transcribed by the computer software. Please disregard these errors. Please excuse any errors that have escaped final proofreading.

## 2021-01-12 NOTE — DISCHARGE SUMMARY
Hospitalist Discharge Summary Patient ID: 
Hector Tolentino 
[id number] 
14 y.o. 
1940 12/30/2020 PCP on record: Fausto Lundy NP Admit date: 12/30/2020 Discharge date and time: 1/12/2021 DISCHARGE DIAGNOSIS: 
 
Cause of death: COVID 19 pneumonia Lactic acidosis, poa resolved Acute on chronic hypoxic respiratory failure usually on 3 liters HOT Pulmonary fibrosis Dysphasia ARAVIND poa on CKD stage III Dementia Hypernatremia, resolved Code status: DNR  
 
 
CONSULTATIONS: 
IP CONSULT TO GASTROENTEROLOGY Excerpted HPI from H&P of Madalyn Diaz MD: 
 
 Reason for ICU Admission: This is an [de-identified] yr old male with a PMHx of bilateral deafness, Pulmonary fibrosis, COPD on 3 liters oxygen, SSS s/p Pacer, CKD, HLD/HTN, hernia, GERD and arthritis who was brought in by EMS 12/30/20 for respiratory distress and sats 70s in the field on his baseline 3 liter NC. He was given an albuterol treatment with no relief and in ER was found to have moderate pulmonary edema on PCXR superimposed on pulmonary fibrosis changes. Roni Soria His ABG was reflective of hypoxia with a PO2 139 on 100% but was otherwise normal. He was placed on BIPAP 100% and given SL NTG for relief of dyspnea and given 40 Lasix IV x1 with poor result in UO. Lab findings include BNP 1159, LA 2.1, Procal was 2.1 and COVID swab was POS. On exam he was dyspneic but mentating well and was very difficult to understand with BIPAP in place. He was reported to have increased confusion at home over the last month per his daughter Fara who reports he lives alone and the family checks on him routinely, in addition to home health nursing and they are in the process of getting him to an assisted living or SNF facility for additional assistance. I discussed the findings with her and relayed the COVID + result and asked her to discuss with her siblings goals of care, informing her that with his prior co morbidities of pulm fibrosis, COPD and CKD that he would likely not do well on a ventilator. Her number is [phone] (FARA PAGE-ALMITA) and she reports her brother, a physician is POA. He will be admitted to ICU for further care. 
 
______________________________________________________________________ DISCHARGE SUMMARY/HOSPITAL COURSE:  for full details see H&P, daily progress notes, labs, consult notes. Dysphasia  
- failed speech Pulled dabhoff placed by IR; no NG was able to be placed due to [facility] Was hoping to re place dabhoff today but was not a candidate due to respiratory status / declining IVF was used - per chart: pt with swallowing issues since 7/2020  
-Seen by GI on 1/6 to consider PEG: hold off PEG tube at this time given that he is still recovering from his acute illness and his long term prognosis is not yet certain. Alternative means of providing nutrition in the short term: given his medium to large sized hiatal hernia, a dobhoff tube as the first choice or if that is not feasible, then an NGT.  If he pulls out the tube or does not tolerate it for any reason, may consider TPN.  If the patient makes a full recovery and a PEG tube is desired for long term nutrition needs, we would be happy to reassess him.   
  
Lactic acidosis, poa resolved Acute on chronic hypoxic respiratory failure usually on 3 liters HOT 
COVID pneumonia Pulmonary fibrosis - 1/11: respiratory status continue to deteriorate , hypotensive O2: on baseline 3 L Leukocytosis 1/9 - ? steroids  Vs new infectious process  
dose of albumin 1/10 Lactic acidosis 2.2  On 1/11 ?  --> IVF/ monitor closely / r/o other source of infection : repeated cxray - stable BL infiltrates  
ua was clear; BC NTD  
 started empiric AB since hypotensive /elevated lactic on 1/10 Cannot give diuretics due to soft BP  
- completed dexamethasone for 10 days on 01/08),vitamin C and Zinc 
- s/p remdesivir completed 01/05 
- s/p azithromycin,ceftriaxone  
  
ARAVIND poa on CKD stage III  
- baseline appears to be ~ 1.3-1.7 , was stable  
monitored closely. Avoided nephrotoxic drugs, adjust all meds to GFR. Dementia Avoided oversedation chest 
  
Hypernatremia, resolved Restless leg syndrome SSS s/p PPM 
Schatzki ring Hyperlipidemia, on statin 1/11: pt continue to decline, shallow respiration / tachypnea. Spoke with dtr twice. Family agreed on CMO/ morphine prn comfort   
  
1/9: dtr Fara was updated 1/10: called dtr Fara, busy signal, will try again; was able to reach dtr Fara and updated in details, discussed current clinical status , management : spoke with dtr twice ; agreed on comfort/ morphine prn  
  
Code status: DNR Prophylaxis: lovenox  
  
  
Recommended Disposition:    
 
_______________________________________________________________________ Patient seen and examined by me on discharge day. See separate notes  
_______________________________________________________________________ DISCHARGE MEDICATIONS:  
Discharge Medication List as of 2021  2:59 AM  
  
 
 
 
Follow-up Information None 
  
 
________________________________________________________________ Risk of deterioration:  Condition at Discharge:     
__________________________________________________________________ Disposition 
____________________________________________________________________ Code Status: DNR Total time in minutes spent coordinating this discharge (includes going over instructions, follow-up, prescriptions, and preparing report for sign off to her PCP) :  >30 minutes Signed: 
Yuni Espinsoa MD

## 2021-01-13 NOTE — PROGRESS NOTES
Quality: Chart reviewed for death and restraints. Bilateral soft wrist restraints noted during hospitalization. Restraints not a contributing factor in patient death. Documented for tracking according to CMS guidelines at 1503 on 1/13/21.

## 2021-01-15 LAB
BACTERIA SPEC CULT: NORMAL
SERVICE CMNT-IMP: NORMAL

## 2022-05-05 NOTE — PROGRESS NOTES
General Surgery End of Shift Nursing Note    Bedside shift change report given to Carlyle Ramires (oncoming nurse) by Zonia Hu (offgoing nurse). Report included the following information Summary    Shift worked:   [employer] of shift:  Oriented       - earlier RN reported confusion at times     RA     Full liquid diet tolerating - No advancement today     Linear incision intact.     No c/o pain.     Maria out, has voided. No BM this shift.     Reported mobility walker x 2/ bed alarm. Attempts to get out of bed.         Issues for physician to address:   Advance diet     Number times ambulated in hallway past shift: 0, walked to BR   Number of times OOB to chair past shift: 0 seen    Pain Management:  Current medication: Norco, Dilaudid 0.5 IV  Patient states pain is manageable on current pain medication: YES, none this shift    GI:    Current diet:  DIET FULL LIQUID    Tolerating current diet: YES  Passing flatus: ? Pt could not give direct answer  Last Bowel Movement: several days ago   Appearance: not known by this RN    Respiratory:    Incentive Spirometer at bedside:   Patient instructed on use:     Patient Safety:    Falls Score: 4  Bed Alarm On? yes  Sitter?  No    Terra Mackey Interval History:  Notes reviewed, no acute events overnight. States 6/10 pain to RLE improves with pain meds. HD stopped yesterday  due to n/v and ST.     To OR for wash out for RLE surgical wound.     Awaiting LTAC placement once cleared from sx and ID standpoint.     Review of Systems   Constitutional:  Negative for activity change, appetite change, chills, diaphoresis and fever.   Respiratory:  Negative for chest tightness and shortness of breath.    Cardiovascular:  Negative for chest pain, palpitations and leg swelling.   Gastrointestinal:  Negative for abdominal pain, diarrhea and nausea.    Musculoskeletal:  Positive for gait problem and myalgias. Negative for back pain.   Skin:  RLE swelling and pain  Neurological:  Negative for dizziness, weakness and headaches.   Psychiatric/Behavioral:  Negative for agitation, behavioral problems and confusion.   All other systems reviewed and are negative.  Objective:     Vital Signs (Most Recent):  Temp: 98.6 °F (37 °C) (05/03/22 0753)  Pulse: (!) 119 (05/03/22 0753)  Resp: 16 (05/03/22 0826)  BP: 137/63 (05/03/22 0753)  SpO2: (!) 94 % (05/03/22 0753)   Vital Signs (24h Range):  Temp:  [96.8 °F (36 °C)-100.4 °F (38 °C)] 98.6 °F (37 °C)  Pulse:  [109-134] 119  Resp:  [16-18] 16  SpO2:  [94 %-96 %] 94 %  BP: (117-187)/() 137/63     Weight: 81.2 kg (179 lb)  Body mass index is 26.43 kg/m².    Intake/Output Summary (Last 24 hours) at 5/3/2022 1047  Last data filed at 5/3/2022 0400  Gross per 24 hour   Intake 500 ml   Output 4150 ml   Net -3650 ml        Physical Exam  Vitals and nursing note reviewed.   Constitutional:       General: He is not in acute distress.     Appearance: Normal appearance. He is normal weight. He is not ill-appearing, toxic-appearing or diaphoretic.    Cardiovascular:      Rate and Rhythm: Regular rhythm. Tachycardia present.      Pulses: Normal pulses.      Heart sounds: Normal heart sounds. No murmur heard.  Pulmonary:      Effort: Pulmonary  effort is normal. No respiratory distress.      Breath sounds: Normal breath sounds. No stridor. No wheezing, rhonchi or rales.   Abdominal:      General: Bowel sounds are normal. There is no distension.      Palpations: Abdomen is soft.      Tenderness: There is no abdominal tenderness.   Musculoskeletal:         General: Swelling and tenderness present. Normal range of motion.      Cervical back: Normal range of motion.      Right upper leg: Swelling present.      Right lower leg: Swelling present.      Comments: Right leg wound vac in place and drsg CDI.  Soft tissue mass to left a and is firm and tender to palpation, no surrounding erythema, fluctuance or open wound.   Skin:     General: Skin is warm and dry.   Neurological:      Mental Status: He is alert and oriented to person, place, and time. Mental status is at baseline.   Psychiatric:         Mood and Affect: Mood normal.         Behavior: Behavior normal.         Thought Content: Thought content normal.       Significant Labs: All pertinent labs within the past 24 hours have been reviewed.  CBC:   Recent Labs   Lab 05/01/22  1103 05/02/22  0410 05/03/22  0549   WBC 16.38*  16.38* 18.53*  18.53* 20.78*   HGB 8.3*  8.3* 8.3*  8.3* 7.6*   HCT 25.4*  25.4* 25.2*  25.2* 23.5*     359 369  369 354       CMP:   Recent Labs   Lab 05/02/22  0410 05/03/22  0549   * 135*   K 3.9 3.9   CL 99 97   CO2 24 26    104   BUN 39* 39*   CREATININE 2.7* 2.8*   CALCIUM 14.9* 12.3*   PROT 6.7 6.9   ALBUMIN 2.3* 2.3*   BILITOT 0.5 0.3   ALKPHOS 100 102   AST 42* 27   ALT 19 16   ANIONGAP 12 12   EGFRNONAA 32* 30*       Microbiology Results (last 7 days)       Procedure Component Value Units Date/Time    Urine culture [[id number]] Collected: 05/01/22 2211    Order Status: Completed Specimen: Urine Updated: 05/03/22 0802     Urine Culture, Routine No growth    Narrative:      Specimen Source->Urine    Aerobic culture [[id number]] Collected: 05/02/22 1400     Order Status: Sent Specimen: Wound from Leg, Right Updated: 05/02/22 2236    Culture, Anaerobic [[id number]] Collected: 05/02/22 1400    Order Status: Sent Specimen: Wound from Leg, Right Updated: 05/02/22 2236    Blood culture [[id number]] Collected: 05/01/22 1216    Order Status: Completed Specimen: Blood Updated: 05/02/22 1812     Blood Culture, Routine No Growth to date      No Growth to date    Narrative:      Collection has been rescheduled by MR at 05/01/2022 11:22 Reason:   Patient unavailable starting dialysis and with the dr     Collection has been rescheduled by MR at 05/01/2022 11:42 Reason:   Got first set second set due after 12 couldnt stick other side due to   IV   Collection has been rescheduled by MR at 05/01/2022 11:22 Reason:   Patient unavailable starting dialysis and with the dr     Collection has been rescheduled by MR at 05/01/2022 11:42 Reason:   Got first set second set due after 12 couldnt stick other side due to   IV     Blood culture [[id number]] Collected: 05/01/22 1140    Order Status: Completed Specimen: Blood Updated: 05/02/22 1812     Blood Culture, Routine No Growth to date      No Growth to date    Narrative:      Collection has been rescheduled by MR at 05/01/2022 11:22 Reason:   Patient unavailable starting dialysis and with the dr   Collection has been rescheduled by MR at 05/01/2022 11:22 Reason:   Patient unavailable starting dialysis and with the dr     Blood culture [[id number]] Collected: 05/02/22 0410    Order Status: Completed Specimen: Blood from Antecubital, Right Arm Updated: 05/02/22 1715     Blood Culture, Routine No Growth to date    Influenza A & B by Molecular [[id number]] Collected: 05/02/22 0900    Order Status: Completed Specimen: Nasopharyngeal Swab Updated: 05/02/22 1021     Influenza A, Molecular Negative     Influenza B, Molecular Negative     Flu A & B Source Nasal swab          Significant Imaging:   No new imaging    I

## 2023-01-14 NOTE — PROGRESS NOTES
Surgery      No BM or flatus yet  Add pericolace to regimen  Cont full liquids    Difficulty sleeping  Benadryl prn    Back pqain, chronic  ibuprofin    Working with PT    SNF friday      Michjael s. Cheryle Neighbours  ED [facility] Performed Resulted

## 2023-03-31 NOTE — ANESTHESIA PREPROCEDURE EVALUATION
Anesthetic History   No history of anesthetic complications            Review of Systems / Medical History  Patient summary reviewed, nursing notes reviewed and pertinent labs reviewed    Pulmonary    COPD: mild    Sleep apnea: CPAP    Asthma : well controlled  Pertinent negatives: No shortness of breath  Comments: pulmonary fibrosis   Neuro/Psych   Within defined limits           Cardiovascular    Hypertension          Pacemaker (Hx of SSS), CAD and hyperlipidemia  Pertinent negatives: No dysrhythmias and angina  Exercise tolerance: <4 METS: Walks with a cane. Comments: Ejection fraction was estimated in the range of 55 % to 60 %.   Mild MR  Mod AR   GI/Hepatic/Renal     GERD: well controlled    Renal disease: CRI       Endo/Other        Arthritis     Other Findings   Comments: [place]  restless legs syndrome         Physical Exam    Airway  Mallampati: III  TM Distance: 4 - 6 cm  Neck ROM: normal range of motion   Mouth opening: Diminished (comment)     Cardiovascular  Regular rate and rhythm,  S1 and S2 normal,  no murmur, click, rub, or gallop             Dental  No notable dental hx       Pulmonary  Breath sounds clear to auscultation               Abdominal  GI exam deferred       Other Findings            Anesthetic Plan    ASA: 3, emergent  Anesthesia type: general    Monitoring Plan: BIS      Induction: Intravenous  Anesthetic plan and risks discussed with: Patient      RSI no

## 2025-06-20 NOTE — PROGRESS NOTES
Brief Postoperative Note      Patient: Lambert Morales  YOB: 1977  MRN: [id number]    Date of Procedure: 6/20/2025    Pre-Op Diagnosis Codes:      * Rupture of distal biceps tendon, left, initial encounter [S46.212A]    Post-Op Diagnosis: Same       Procedure(s):  LEFT DISTAL BICEPS TENDON REPAIR    Surgeon(s):  Gómez Reed MD    Assistant:  Surgical Assistant: Hu Marie RN    Anesthesia: General    Estimated Blood Loss (mL): less than 50     Complications: None    Specimens:   * No specimens in log *    Implants:  Implant Name Type Inv. Item Serial No.  Lot No. LRB No. Used Action   SYSTEM IMPL DST BICEPS REP DEL W/ BICEPS BTTN 7X10MM PEEK - [id number]  SYSTEM IMPL DST BICEPS REP DEL W/ BICEPS BTTN 7X10MM PEEK  [employer] [id number] Left 1 Implanted         Drains: * No LDAs found *    Findings:  Infection Present At Time Of Surgery (PATOS) (choose all levels that have infection present):  No infection present        Electronically signed by Gómez Reed MD on 6/20/2025 at 12:11 PM   Subjective:      Marie Law is a 68 y.o. male is here for EP consult. He reported recent near syncope and subsequent Holter monitor demonstrated episodes of profound bradycardia in the 20s/30s during waking hours. The patient denies chest pain/ shortness of breath, orthopnea, PND, LE edema, palpitations, syncope.      Patient Active Problem List    Diagnosis Date Noted    Left inguinal hernia 08/26/2016    Hematuria 08/26/2016    Hypertension 05/06/2016    Idiopathic pulmonary fibrosis ([facility] [address] 01/28/2015    CAD (coronary artery disease)     Hypercholesterolemia     ED (erectile dysfunction) 10/31/2013    Family history of colon cancer 09/25/2013    Colon polyp 09/25/2013    Diverticulosis 09/25/2013    CKD (chronic kidney disease) 01/31/2012    Vitamin D deficiency 07/21/2011    BPH (benign prostatic hypertrophy) with urinary obstruction 07/21/2011    Acne rosacea 07/21/2011    COPD (chronic obstructive pulmonary disease) ([facility] [address] 07/13/2010    GERD (gastroesophageal reflux disease) 07/13/2010    Allergic rhinitis 07/13/2010    DJD (degenerative joint disease) of knee 07/13/2010      Roxana Balderrama MD  Past Medical History:   Diagnosis Date    Arthritis     parris knee    Asthma     dr Lia Arteaga 1/15/15 note rec'd 4/6/16 pul fibrosis    Basal cell carcinoma of skin 05/10/2017    right helix Jose David Baez's note rec'd    CAD (coronary artery disease)     dr Chapin Daugherty CKD (chronic kidney disease), stage III 5/2014    Abou Nakitai notes 6/2/15    Colon polyps 09/25/13    also dad with colon cancer    Contact dermatitis and other eczema, due to unspecified cause     eczema dr Briseno Memos    COPD     dr Pepe Emery chronic bronchitis     11/16/16    Elevated serum creatinine     Fall 11 & 12/2012    Right knee gave away and tripped in his house    GERD (gastroesophageal reflux disease)     dr Veronica Carias (hard of hearing)     Hypercholesterolemia     ILD (interstitial lung disease) ([facility] [address] 07/15/2014    Dr Júnior Manley's   pulmonary fibrosis 17 f/u note    Proteinuria 10/2012    abou assi     16    RLS (restless legs syndrome)     Schatzki's ring     dilation dr Jonathan De Souza for glaucoma 14    Michael Linker note rec'd (annual eye exam)    Shingles 2012    Sleep apnea     uses CPAP- no longer uses      Past Surgical History:   Procedure Laterality Date    ABDOMEN SURGERY PROC UNLISTED      left inq hernia    ENDOSCOPY, COLON, DIAGNOSTIC       dr Luís Carbone repeat in 5 yrs    HX COLONOSCOPY  [id number]    brady- ileocecal valve polyp 5 yr repeat     No Known Allergies   Family History   Problem Relation Age of Onset    Cancer Father       from colon cancer    negative for cardiac disease  Social History     Social History    Marital status:      Spouse name: N/A    Number of children: N/A    Years of education: N/A     Social History Main Topics    Smoking status: Former Smoker    Smokeless tobacco: Former User     Quit date: 1985    Alcohol use No    Drug use: No    Sexual activity: Yes     Birth control/ protection: Spermicide     Other Topics Concern    Not on file     Social History Narrative     Current Outpatient Prescriptions   Medication Sig    omeprazole (PRILOSEC) 20 mg capsule TAKE 1 CAPSULES BY MOUTH DAILY. TO REDUCE STOMACH ACID    aspirin delayed-release 81 mg tablet Take  by mouth daily.  pravastatin (PRAVACHOL) 40 mg tablet TAKE 1 TABLET EVERY DAY IN THE NIGHT TO HELP LOWER CHOLESTEROL    PROAIR HFA 90 mcg/actuation inhaler     ketoconazole (NIZORAL) 2 % topical cream     albuterol (PROVENTIL VENTOLIN) 2.5 mg /3 mL (0.083 %) nebulizer solution 3 mL by Nebulization route every four (4) hours as needed for Wheezing or Shortness of Breath.  ALBUTEROL SULFATE (PROAIR HFA IN) Take 2 puffs by inhalation four (4) times daily as needed.     cholecalciferol, vitamin d3, (VITAMIN D) 1,000 unit tablet Take  by mouth daily.  ADVAIR DISKUS 100-50 mcg/Dose diskus inhaler Take 1 puff by inhalation two (2) times a day.  hydrocortisone (WESTCORT) 0.2 % topical cream     loratadine (CLARITIN) 10 mg tablet Take 10 mg by mouth daily.  coenzyme q10 30 mg capsule Take 30 mg by mouth three (3) times daily.  MULTIVITAMINS (MULTIPLE VITAMIN PO) Take  by mouth.  azelastine (ASTELIN) 137 mcg nasal spray 1 Spray by Nasal route two (2) times a day. Use in each nostril as directed     GLUC HCL/CSANA/GLY-AM-GLY,MX/C (COSAMIN DS PO) Take  by mouth. No current facility-administered medications for this visit. Vitals:    05/25/17 1005   BP: 122/60   Pulse: 60   Resp: 16   SpO2: 98%   Weight: 198 lb (89.8 kg)   Height: 5' 9\" (1.753 m)       I have reviewed the nurses notes, vitals, problem list, allergy list, medical history, family, social history and medications. Review of Symptoms:    General: +fatigue, presyncope. Pt denies excessive weight gain or loss. Pt is able to conduct ADL's  HEENT: Denies blurred vision, headaches, epistaxis and difficulty swallowing. Respiratory: Denies shortness of breath, CORRIGAN, wheezing or stridor. Cardiovascular: Denies precordial pain, palpitations, edema or PND  Gastrointestinal: Denies poor appetite, indigestion, abdominal pain or blood in stool  Urinary: Denies dysuria, pyuria  Musculoskeletal: Denies pain or swelling from muscles or joints  Neurologic: Denies tremor, paresthesias, or sensory motor disturbance  Skin: Denies rash, itching or texture change. Psych: Denies depression      Physical Exam:      General: Well developed, in no acute distress. HEENT: Eyes - PERRL, no jvd  Heart:  Normal S1/S2 negative S3 or S4. Regular, no murmur, gallop or rub.   Respiratory: Clear bilaterally x 4, no wheezing or rales  Abdomen:   Soft, non-tender, bowel sounds are active.   Extremities:  No edema, normal cap refill, no cyanosis.   Musculoskeletal: No clubbing  Neuro: A&Ox3, speech clear, gait stable. Skin: Skin color is normal. No rashes or lesions. Non diaphoretic  Vascular: 2+ pulses symmetric in all extremities    Cardiographics    Ekg: sinus bradycardia    Results for orders placed or performed in visit on 05/19/17   CARDIAC HOLTER MONITOR, 24 HOURS    Narrative    ECG Monitor/24 hours, Complete    Reason for Holter Monitor   SYNCOPE    Heartbeat    Slowest 31  Average 53  Fastest  87        Results:   Underlying Rhythm: Sinus bradycardia      Atrial Arrhythmias: premature atrial contractions; occasional and severe bradycardia            AV Conduction: normal    Ventricular Arrhythmias: premature ventricular contractions; occasional     ST Segment Analysis:normal     Symptom Correlation:  None reported    Comment:   Sinus bradycardia with profound bradycardia to the 20s/30s. Clinical correlation advised. Dana Cunningham MD, [facility]            Lab Results   Component Value Date/Time    WBC 8.1 10/31/2013 11:29 AM    HGB 17.1 10/31/2013 11:29 AM    HCT 49.9 10/31/2013 11:29 AM    PLATELET 226 55/36/5956 11:29 AM    MCV 99 10/31/2013 11:29 AM      Lab Results   Component Value Date/Time    Sodium 143 08/26/2016 11:52 AM    Potassium 4.6 08/26/2016 11:52 AM    Chloride 104 08/26/2016 11:52 AM    CO2 23 08/26/2016 11:52 AM    Anion gap 8 07/13/2010 10:27 AM    Glucose 90 08/26/2016 11:52 AM    BUN 16 08/26/2016 11:52 AM    Creatinine 1.39 08/26/2016 11:52 AM    BUN/Creatinine ratio 12 08/26/2016 11:52 AM    GFR est AA 57 08/26/2016 11:52 AM    GFR est non-AA 49 08/26/2016 11:52 AM    Calcium 9.0 08/26/2016 11:52 AM    Bilirubin, total 1.0 08/26/2016 11:52 AM    AST (SGOT) 22 08/26/2016 11:52 AM    Alk.  phosphatase 94 08/26/2016 11:52 AM    Protein, total 6.5 08/26/2016 11:52 AM    Albumin 4.2 08/26/2016 11:52 AM    Globulin 3.1 07/13/2010 10:27 AM    A-G Ratio 1.8 08/26/2016 11:52 AM    ALT (SGPT) 20 08/26/2016 11:52 AM         Assessment:     Assessment:        ICD-10-CM ICD-9-CM    1. Essential hypertension I10 401.9 AMB POC EKG ROUTINE W/ 12 LEADS, INTER & REP     Orders Placed This Encounter    AMB POC EKG ROUTINE W/ 12 LEADS, INTER & REP     Order Specific Question:   Reason for Exam:     Answer:   ROUTINE        Plan:   Mr. Anne Damico is here for EP consult for profound bradycardia in the 20s/30s during waking hours on Holter monitor post syncopal episode. He has sick sinus syndrome. EKG today demonstrates sinus bradycardia 54bpm. He is a candidate for a pacemaker. I discussed the risks/benefits/alternatives of the procedure with the patient. Risks include (but are not limited to) bleeding, infection, cva/mi/tamponade/death. The patient understands and agrees to proceed. Thank you for this interesting consultation. We will schedule. He is also non-compliant with CPAP, he is being re-evaluated by sleep apnea. Continue medical management for HTN. Thank you for allowing me to participate in [facility] 's care.     Digna Hernandez MD, [facility]

## (undated) DEVICE — DRAIN WND PENRS RADPQ 0.25X18 -- CONVERT TO ITEM 360112

## (undated) DEVICE — STERILE POLYISOPRENE POWDER-FREE SURGICAL GLOVES WITH EMOLLIENT COATING: Brand: PROTEXIS

## (undated) DEVICE — SUT ETHBND 0 18IN MO6 MP GRN --

## (undated) DEVICE — INSULATED BLADE ELECTRODE: Brand: EDGE

## (undated) DEVICE — HANDLE LT SNAP ON ULT DURABLE LENS FOR TRUMPF ALC DISPOSABLE

## (undated) DEVICE — SUTURE MCRYL SZ 4-0 L27IN ABSRB UD L19MM PS-2 1/2 CIR PRIM Y426H

## (undated) DEVICE — BLADE ASSEMB CLP HAIR FINE --

## (undated) DEVICE — REM POLYHESIVE ADULT PATIENT RETURN ELECTRODE: Brand: VALLEYLAB

## (undated) DEVICE — SUTURE VCRL SZ 0 L27IN ABSRB UD L36MM CT-1 1/2 CIR J260H

## (undated) DEVICE — TRAY PREP DRY W/ PREM GLV 2 APPL 6 SPNG 2 UNDPD 1 OVERWRAP

## (undated) DEVICE — APPLICATOR BNDG 1MM ADH PREMIERPRO EXOFIN

## (undated) DEVICE — SYR 10ML LUER LOK 1/5ML GRAD --

## (undated) DEVICE — INFECTION CONTROL KIT SYS

## (undated) DEVICE — SUTURE ABSORBABLE BRAIDED 2-0 CT-1 27 IN UD VICRYL J259H

## (undated) DEVICE — SUT PROL 2-0 30IN CT1 BLU --

## (undated) DEVICE — SUTURE VCRL SZ 4-0 L27IN ABSRB UD L26MM SH 1/2 CIR J415H

## (undated) DEVICE — ATHLETIC SUPPORTER LATEX FREE, MEDIUM

## (undated) DEVICE — TOWEL SURG W17XL27IN STD BLU COT NONFENESTRATED PREWASHED

## (undated) DEVICE — SUTURE VCRL SZ 3-0 L27IN ABSRB VLT L26MM SH 1/2 CIR J316H

## (undated) DEVICE — SPONGE: SPECIALTY PEANUT XR 100/CS: Brand: MEDICAL ACTION INDUSTRIES

## (undated) DEVICE — SURGICAL PROCEDURE PACK BASIN MAJ SET CUST NO CAUT

## (undated) DEVICE — NEEDLE HYPO 25GA L1.5IN BVL ORIENTED ECLIPSE

## (undated) DEVICE — DBD-PACK,LAPAROTOMY,2 REINFORCED GOWNS: Brand: MEDLINE

## (undated) DEVICE — CATHETERIZATION TRAY FOL 14 FR URIMTR STATLOK SURSTP

## (undated) DEVICE — DEVON™ KNEE AND BODY STRAP 60" X 3" (1.5 M X 7.6 CM): Brand: DEVON

## (undated) DEVICE — ROCKER SWITCH PENCIL BLADE ELECTRODE, HOLSTER: Brand: EDGE

## (undated) DEVICE — STERILE POLYISOPRENE POWDER-FREE SURGICAL GLOVES: Brand: PROTEXIS

## (undated) DEVICE — KENDALL SCD EXPRESS SLEEVES, KNEE LENGTH, MEDIUM: Brand: KENDALL SCD

## (undated) DEVICE — GOWN,SIRUS,FABRNF,XL,20/CS: Brand: MEDLINE

## (undated) DEVICE — SUTURE PERMAHAND SZ 2-0 L30IN NONABSORBABLE BLK SILK W/O A305H